# Patient Record
Sex: FEMALE | Race: WHITE | Employment: OTHER | URBAN - METROPOLITAN AREA
[De-identification: names, ages, dates, MRNs, and addresses within clinical notes are randomized per-mention and may not be internally consistent; named-entity substitution may affect disease eponyms.]

---

## 2017-01-05 RX ORDER — LETROZOLE 2.5 MG/1
2.5 TABLET, FILM COATED ORAL DAILY
Qty: 90 TABLET | Refills: 2 | Status: SHIPPED | OUTPATIENT
Start: 2017-01-05 | End: 2017-06-06

## 2017-02-03 ENCOUNTER — NURSE ONLY (OUTPATIENT)
Dept: HEMATOLOGY/ONCOLOGY | Facility: HOSPITAL | Age: 57
End: 2017-02-03
Attending: INTERNAL MEDICINE
Payer: COMMERCIAL

## 2017-02-03 DIAGNOSIS — Z45.2 ENCOUNTER FOR ADJUSTMENT OR MANAGEMENT OF VASCULAR ACCESS DEVICE: Primary | ICD-10-CM

## 2017-02-03 DIAGNOSIS — C48.2 MALIGNANT NEOPLASM OF PERITONEUM (HCC): ICD-10-CM

## 2017-02-03 PROCEDURE — 96523 IRRIG DRUG DELIVERY DEVICE: CPT

## 2017-02-03 RX ORDER — HEPARIN SODIUM (PORCINE) LOCK FLUSH IV SOLN 100 UNIT/ML 100 UNIT/ML
SOLUTION INTRAVENOUS
Status: COMPLETED
Start: 2017-02-03 | End: 2017-02-03

## 2017-02-03 RX ORDER — SODIUM CHLORIDE 0.9 % (FLUSH) 0.9 %
SYRINGE (ML) INJECTION
Status: DISCONTINUED
Start: 2017-02-03 | End: 2017-02-03

## 2017-02-03 RX ORDER — 0.9 % SODIUM CHLORIDE 0.9 %
VIAL (ML) INJECTION
Status: DISCONTINUED
Start: 2017-02-03 | End: 2017-02-03

## 2017-02-03 RX ORDER — HEPARIN SODIUM (PORCINE) LOCK FLUSH IV SOLN 100 UNIT/ML 100 UNIT/ML
5 SOLUTION INTRAVENOUS ONCE
Status: COMPLETED | OUTPATIENT
Start: 2017-02-03 | End: 2017-02-03

## 2017-02-03 RX ADMIN — HEPARIN SODIUM (PORCINE) LOCK FLUSH IV SOLN 100 UNIT/ML 500 UNITS: 100 SOLUTION INTRAVENOUS at 11:14:00

## 2017-02-03 NOTE — PROGRESS NOTES
Patient arrives for port flush. Port accessed using sterile technique, positive blood return noted. Port flushed with saline and heparin. Port de accessed, site covered with 2x2 and paper tape.  Patient discharged in stable condition with future appointment

## 2017-02-21 ENCOUNTER — APPOINTMENT (OUTPATIENT)
Dept: HEMATOLOGY/ONCOLOGY | Facility: HOSPITAL | Age: 57
End: 2017-02-21
Attending: INTERNAL MEDICINE
Payer: COMMERCIAL

## 2017-03-30 ENCOUNTER — TELEPHONE (OUTPATIENT)
Dept: HEMATOLOGY/ONCOLOGY | Facility: HOSPITAL | Age: 57
End: 2017-03-30

## 2017-03-30 ENCOUNTER — NURSE ONLY (OUTPATIENT)
Dept: HEMATOLOGY/ONCOLOGY | Facility: HOSPITAL | Age: 57
End: 2017-03-30
Attending: INTERNAL MEDICINE
Payer: COMMERCIAL

## 2017-03-30 ENCOUNTER — HOSPITAL ENCOUNTER (OUTPATIENT)
Dept: CT IMAGING | Facility: HOSPITAL | Age: 57
Discharge: HOME | End: 2017-03-30
Attending: INTERNAL MEDICINE
Payer: COMMERCIAL

## 2017-03-30 DIAGNOSIS — C48.2 PRIMARY PERITONEAL CARCINOMATOSIS (HCC): ICD-10-CM

## 2017-03-30 DIAGNOSIS — Z45.2 ENCOUNTER FOR ADJUSTMENT OR MANAGEMENT OF VASCULAR ACCESS DEVICE: Primary | ICD-10-CM

## 2017-03-30 DIAGNOSIS — C48.2 MALIGNANT NEOPLASM OF PERITONEUM (HCC): ICD-10-CM

## 2017-03-30 PROCEDURE — 74177 CT ABD & PELVIS W/CONTRAST: CPT

## 2017-03-30 PROCEDURE — 71260 CT THORAX DX C+: CPT

## 2017-03-30 PROCEDURE — 36591 DRAW BLOOD OFF VENOUS DEVICE: CPT

## 2017-03-30 PROCEDURE — 82565 ASSAY OF CREATININE: CPT

## 2017-03-30 RX ORDER — 0.9 % SODIUM CHLORIDE 0.9 %
VIAL (ML) INJECTION
Status: DISCONTINUED
Start: 2017-03-30 | End: 2017-03-30

## 2017-03-30 RX ORDER — HEPARIN SODIUM (PORCINE) LOCK FLUSH IV SOLN 100 UNIT/ML 100 UNIT/ML
5 SOLUTION INTRAVENOUS ONCE
Status: COMPLETED | OUTPATIENT
Start: 2017-03-30 | End: 2017-03-30

## 2017-03-30 RX ORDER — HEPARIN SODIUM (PORCINE) LOCK FLUSH IV SOLN 100 UNIT/ML 100 UNIT/ML
SOLUTION INTRAVENOUS
Status: COMPLETED
Start: 2017-03-30 | End: 2017-03-30

## 2017-03-30 RX ADMIN — HEPARIN SODIUM (PORCINE) LOCK FLUSH IV SOLN 100 UNIT/ML 500 UNITS: 100 SOLUTION INTRAVENOUS at 13:10:00

## 2017-03-30 NOTE — PROGRESS NOTES
Pt brought back to lab ambulating without assistance. Pt is going for a CT today. Port accessed with power port needle using sterile technique without complication, blood return noted. Labs drawn and labled, given to pt.   Line flushed with 20 cc saline

## 2017-03-30 NOTE — TELEPHONE ENCOUNTER
3/30/17 CT c/a/p shows DE/SD in abdomen. Incidental finding of SMA thrombosis. Patient reports new right-sided abdominal pain x 1 month, lasts a 1-2 seconds only and occurred 2 times to date. Pradaxa 150 mg BID. Stop ASA 81 mg.      Bleeding and GI

## 2017-03-31 ENCOUNTER — TELEPHONE (OUTPATIENT)
Dept: HEMATOLOGY/ONCOLOGY | Facility: HOSPITAL | Age: 57
End: 2017-03-31

## 2017-03-31 NOTE — TELEPHONE ENCOUNTER
Message from answering service stating that [address] [place] [address] [phone] does not cover RX. Patient sent message to Dr. Rowdy Salmeron re: this same message.

## 2017-04-03 ENCOUNTER — TELEPHONE (OUTPATIENT)
Dept: HEMATOLOGY/ONCOLOGY | Facility: HOSPITAL | Age: 57
End: 2017-04-03

## 2017-04-03 NOTE — TELEPHONE ENCOUNTER
Returned call, patient to start Xarelto today just received kit from pharmacy. To follow up with MD in a couple of days.

## 2017-04-03 NOTE — TELEPHONE ENCOUNTER
PLEASE CALL PT ASAP [phone] REGARDING THE PRADAXA MED THAT MD ORDERED IS NOT COVERED BY HER INS.   ON FRI 3/31/17,THE MD ON CALL ORDERED THE XARALTO STARTER KIT FOR PT.  SHOULD PT TAKE THE XARELTO OR THE ELOQUIST?-MKV

## 2017-04-05 ENCOUNTER — OFFICE VISIT (OUTPATIENT)
Dept: HEMATOLOGY/ONCOLOGY | Facility: HOSPITAL | Age: 57
End: 2017-04-05
Attending: INTERNAL MEDICINE
Payer: COMMERCIAL

## 2017-04-05 VITALS
WEIGHT: 204 LBS | TEMPERATURE: 99 F | SYSTOLIC BLOOD PRESSURE: 139 MMHG | HEART RATE: 103 BPM | BODY MASS INDEX: 34.83 KG/M2 | DIASTOLIC BLOOD PRESSURE: 78 MMHG | HEIGHT: 64 IN | RESPIRATION RATE: 18 BRPM

## 2017-04-05 DIAGNOSIS — K55.069 SUPERIOR MESENTERIC VEIN THROMBOSIS (HCC): ICD-10-CM

## 2017-04-05 DIAGNOSIS — C48.2 PRIMARY PERITONEAL CARCINOMATOSIS (HCC): Primary | ICD-10-CM

## 2017-04-05 DIAGNOSIS — M25.561 ARTHRALGIA OF BOTH KNEES: ICD-10-CM

## 2017-04-05 DIAGNOSIS — Z51.11 ENCOUNTER FOR CHEMOTHERAPY MANAGEMENT: ICD-10-CM

## 2017-04-05 DIAGNOSIS — M25.562 ARTHRALGIA OF BOTH KNEES: ICD-10-CM

## 2017-04-05 PROCEDURE — 99215 OFFICE O/P EST HI 40 MIN: CPT | Performed by: INTERNAL MEDICINE

## 2017-04-05 NOTE — PROGRESS NOTES
Cancer Center Progress Note    Patient Name: Amrit Castaneda   YOB: 1960   Medical Record Number: [id number]   Attending Physician: Kulwant Boucher M.D.      Chief Complaint:  F/u primary peritoneal carcinoma    History of Present Illness:  Aysha patient is on anticoagulation with rivaroxaban. Interval history:  Patient is now rivaroxaban in setting of SMV thrombosis. She did have some mild abdominal pain at that time. This is improved. She has continued on letrozole.   She denies any fevers abdominal wall (8 cm), and omentectomy,  Small bowel resection with primary anastomosis, Mobilization of the splenic flexure, Low anterior resection with colorectal anastomosis, Hyperthermic intraperitoneal chemotherapy with cisplatin and doxorubicin    OT insulin aspart (NOVOLOG) 100 UNIT/ML Subcutaneous Solution, Inject via insulin pump, maximum of 200 units daily, Disp: 18 vial, Rfl: 1  •  CARVEDILOL 12.5 MG Oral Tab, TAKE 1 TABLET BY MOUTH 2 (TWO) TIMES DAILY WITH MEALS., Disp: 180 tablet, Rfl: 1  •  Lop K, CL, CO2, ALKPHO, AST, ALT, BILT, TP in the last 72 hours. Radiology:  CT CAP 3/30/17  1. Metastatic peritoneal nodules are redemonstrated. The nodules are stable to decreased in size. No new nodules are present.      2.  Nonocclusive thrombus in the 2016  --Evaluated by Dr Gera Stewart at [facility]. 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 Staining positive for ER. The patient is now on letrozole as of 10/24/2016.   Her imaging shows inte

## 2017-05-16 ENCOUNTER — PATIENT MESSAGE (OUTPATIENT)
Dept: ENDOCRINOLOGY CLINIC | Facility: CLINIC | Age: 57
End: 2017-05-16

## 2017-05-16 ENCOUNTER — TELEPHONE (OUTPATIENT)
Dept: ENDOCRINOLOGY CLINIC | Facility: CLINIC | Age: 57
End: 2017-05-16

## 2017-05-16 RX ORDER — INSULIN ASPART 100 [IU]/ML
INJECTION, SOLUTION INTRAVENOUS; SUBCUTANEOUS
Qty: 18 VIAL | Refills: 0 | Status: SHIPPED | OUTPATIENT
Start: 2017-05-16 | End: 2017-06-07

## 2017-05-16 NOTE — TELEPHONE ENCOUNTER
Follow up scheduled 7/19/17. Per [facility] protocol ok to refill through upcoming appt.

## 2017-05-16 NOTE — TELEPHONE ENCOUNTER
Pt called to request 90 day RX for pump. Pt scheduled next available appt in [place] on 7/19/17. Please call.       Current outpatient prescriptions:   •  insulin aspart (NOVOLOG) 100 UNIT/ML Subcutaneous Solution, Inject via insulin pump, maximum of 200 unit

## 2017-05-23 ENCOUNTER — APPOINTMENT (OUTPATIENT)
Dept: HEMATOLOGY/ONCOLOGY | Facility: HOSPITAL | Age: 57
End: 2017-05-23
Attending: INTERNAL MEDICINE
Payer: COMMERCIAL

## 2017-05-25 ENCOUNTER — APPOINTMENT (OUTPATIENT)
Dept: HEMATOLOGY/ONCOLOGY | Facility: HOSPITAL | Age: 57
End: 2017-05-25
Attending: INTERNAL MEDICINE
Payer: COMMERCIAL

## 2017-06-06 ENCOUNTER — NURSE ONLY (OUTPATIENT)
Dept: HEMATOLOGY/ONCOLOGY | Facility: HOSPITAL | Age: 57
End: 2017-06-06
Attending: INTERNAL MEDICINE
Payer: COMMERCIAL

## 2017-06-06 VITALS
BODY MASS INDEX: 35.17 KG/M2 | HEIGHT: 64 IN | SYSTOLIC BLOOD PRESSURE: 138 MMHG | HEART RATE: 76 BPM | DIASTOLIC BLOOD PRESSURE: 70 MMHG | TEMPERATURE: 99 F | RESPIRATION RATE: 16 BRPM | WEIGHT: 206 LBS

## 2017-06-06 DIAGNOSIS — M25.561 ARTHRALGIA OF BOTH KNEES: ICD-10-CM

## 2017-06-06 DIAGNOSIS — M25.562 ARTHRALGIA OF BOTH KNEES: ICD-10-CM

## 2017-06-06 DIAGNOSIS — Z51.11 ENCOUNTER FOR CHEMOTHERAPY MANAGEMENT: Primary | ICD-10-CM

## 2017-06-06 DIAGNOSIS — C48.2 PRIMARY PERITONEAL CARCINOMATOSIS (HCC): Primary | ICD-10-CM

## 2017-06-06 DIAGNOSIS — C48.2 MALIGNANT NEOPLASM OF PERITONEUM (HCC): ICD-10-CM

## 2017-06-06 DIAGNOSIS — Z45.2 ENCOUNTER FOR ADJUSTMENT OR MANAGEMENT OF VASCULAR ACCESS DEVICE: ICD-10-CM

## 2017-06-06 DIAGNOSIS — C48.2 PRIMARY PERITONEAL CARCINOMATOSIS (HCC): ICD-10-CM

## 2017-06-06 DIAGNOSIS — K55.069 SUPERIOR MESENTERIC VEIN THROMBOSIS (HCC): ICD-10-CM

## 2017-06-06 PROCEDURE — 80053 COMPREHEN METABOLIC PANEL: CPT

## 2017-06-06 PROCEDURE — 36591 DRAW BLOOD OFF VENOUS DEVICE: CPT

## 2017-06-06 PROCEDURE — 85025 COMPLETE CBC W/AUTO DIFF WBC: CPT

## 2017-06-06 PROCEDURE — 99215 OFFICE O/P EST HI 40 MIN: CPT | Performed by: INTERNAL MEDICINE

## 2017-06-06 RX ORDER — 0.9 % SODIUM CHLORIDE 0.9 %
VIAL (ML) INJECTION
Status: DISCONTINUED
Start: 2017-06-06 | End: 2017-06-06

## 2017-06-06 RX ORDER — HEPARIN SODIUM (PORCINE) LOCK FLUSH IV SOLN 100 UNIT/ML 100 UNIT/ML
SOLUTION INTRAVENOUS
Status: COMPLETED
Start: 2017-06-06 | End: 2017-06-06

## 2017-06-06 RX ORDER — HEPARIN SODIUM (PORCINE) LOCK FLUSH IV SOLN 100 UNIT/ML 100 UNIT/ML
5 SOLUTION INTRAVENOUS ONCE
Status: COMPLETED | OUTPATIENT
Start: 2017-06-06 | End: 2017-06-06

## 2017-06-06 RX ORDER — LETROZOLE 2.5 MG/1
2.5 TABLET, FILM COATED ORAL DAILY
Qty: 90 TABLET | Refills: 2 | Status: SHIPPED | OUTPATIENT
Start: 2017-06-06 | End: 2017-10-02

## 2017-06-06 RX ADMIN — HEPARIN SODIUM (PORCINE) LOCK FLUSH IV SOLN 100 UNIT/ML 500 UNITS: 100 SOLUTION INTRAVENOUS at 13:20:00

## 2017-06-06 NOTE — PROGRESS NOTES
Cancer Center Progress Note    Patient Name: Valencia Virk   YOB: 1960   Medical Record Number: [id number]   Attending Physician: Nidhi Whelan M.D.      Chief Complaint:  F/u primary peritoneal carcinoma    History of Present Illness:  Aysha patient is on anticoagulation with rivaroxaban. Interval history:  She is returning for routine follow-up. She is doing well overall. She states she has gained some weight. She denies abdominal pain. She denies any bruising or bleeding.   She denies colorectal anastomosis, Hyperthermic intraperitoneal chemotherapy with cisplatin and doxorubicin    OTHER SURGICAL HISTORY  07/21/2015    Comment Ileostomy takedown,   Left subclavian vein single-lumen port placement with fluoroscopic guidance.        Ashley Dixon 11  •  diphenoxylate-atropine (LOMOTIL) 2.5-0.025 MG Oral Tab, Take 1 tablet by mouth 4 (four) times daily as needed for Diarrhea., Disp: 360 tablet, Rfl: 1    Allergies:    Adhesive Tape           Itching    Comment:PLEASE USE MEPORE DRESSING AND A 3M HUMBLE platinum resistant.   She initially presented to ER w/abdominal pain and underwent laparoscopic appendectomy by Dr Diaz [place] (12/2013).   Peritoneal implants were noted and final pathology revealed \"findings are most suggestive of ovarian invasive implants w anticoagulation in March 2017. Continue rivaroxaban. Tentatively plan for 6 months of treatment. The patient's emotional well being was assessed and resources were discussed.   Appropriate resources were reviewed and discussed with the pateint and iraida

## 2017-06-06 NOTE — PROGRESS NOTES
Patient here for FT lab followed by MD visit. Left chest port accessed per sterile technique, good blood return noted. Cbc,cmp obtained and sent to lab. Port flushed per protocol and thomas needle removed. Gauze and paper tape to site.   Patient discharg

## 2017-06-07 ENCOUNTER — OFFICE VISIT (OUTPATIENT)
Dept: ENDOCRINOLOGY CLINIC | Facility: CLINIC | Age: 57
End: 2017-06-07

## 2017-06-07 VITALS
HEIGHT: 65 IN | HEART RATE: 86 BPM | DIASTOLIC BLOOD PRESSURE: 80 MMHG | SYSTOLIC BLOOD PRESSURE: 124 MMHG | WEIGHT: 204.63 LBS | BODY MASS INDEX: 34.09 KG/M2

## 2017-06-07 DIAGNOSIS — Z79.4 UNCONTROLLED TYPE 2 DIABETES MELLITUS WITH HYPERGLYCEMIA, WITH LONG-TERM CURRENT USE OF INSULIN (HCC): Primary | ICD-10-CM

## 2017-06-07 DIAGNOSIS — E11.65 UNCONTROLLED TYPE 2 DIABETES MELLITUS WITH HYPERGLYCEMIA, WITH LONG-TERM CURRENT USE OF INSULIN (HCC): Primary | ICD-10-CM

## 2017-06-07 PROCEDURE — 99214 OFFICE O/P EST MOD 30 MIN: CPT | Performed by: INTERNAL MEDICINE

## 2017-06-07 PROCEDURE — 83036 HEMOGLOBIN GLYCOSYLATED A1C: CPT | Performed by: INTERNAL MEDICINE

## 2017-06-07 PROCEDURE — 36416 COLLJ CAPILLARY BLOOD SPEC: CPT | Performed by: INTERNAL MEDICINE

## 2017-06-07 PROCEDURE — 99212 OFFICE O/P EST SF 10 MIN: CPT | Performed by: INTERNAL MEDICINE

## 2017-06-07 PROCEDURE — 82962 GLUCOSE BLOOD TEST: CPT | Performed by: INTERNAL MEDICINE

## 2017-06-07 RX ORDER — INSULIN ASPART 100 [IU]/ML
INJECTION, SOLUTION INTRAVENOUS; SUBCUTANEOUS
Qty: 18 VIAL | Refills: 3 | Status: SHIPPED | OUTPATIENT
Start: 2017-06-07 | End: 2018-02-20

## 2017-06-07 RX ORDER — CARVEDILOL 12.5 MG/1
TABLET ORAL
Qty: 180 TABLET | Refills: 1 | Status: SHIPPED | OUTPATIENT
Start: 2017-06-07 | End: 2017-09-28

## 2017-06-07 RX ORDER — ATORVASTATIN CALCIUM 40 MG/1
40 TABLET, FILM COATED ORAL NIGHTLY
Qty: 90 TABLET | Refills: 3 | Status: SHIPPED | OUTPATIENT
Start: 2017-06-07 | End: 2018-02-20

## 2017-06-07 NOTE — PROGRESS NOTES
Name: Luis Mock  Date: 6/7/2017    Referring Physician: No ref. provider found    HISTORY OF PRESENT ILLNESS   Luis Mock is a 61year old female who presents for diabetes mellitus.   She is now maintained on letrozole for ovarian metastatic CA, Disp: , Rfl:   •  Glucose Blood (FREESTYLE TEST) In Vitro Strip, Test blood glucose level 5 times per day, Disp: 200 each, Rfl: 11  •  diphenoxylate-atropine (LOMOTIL) 2.5-0.025 MG Oral Tab, Take 1 tablet by mouth 4 (four) times daily as needed for Diarrhe Bilateral feet   • History of blood transfusion        Surgical history:       Past Surgical History    APPENDECTOMY  12/2013    HYSTERECTOMY  1999    Comment OSIEL/BSO     UPPER GI ENDOSCOPY - REFERRAL  2013    OTHER SURGICAL HISTORY  03/31/2015    Comment importance of SBGM  -Discussed importance of CHO counting  -Significant improvement in BG levels  -Reviewed pump download and continue current settings  -She will be transitioning to Medicare, discussed possible medtronic pump - check C-peptide   -Normoten

## 2017-06-09 ENCOUNTER — TELEPHONE (OUTPATIENT)
Dept: ENDOCRINOLOGY CLINIC | Facility: CLINIC | Age: 57
End: 2017-06-09

## 2017-06-09 NOTE — TELEPHONE ENCOUNTER
Pt calling to inform RN [employer] will be faxing forms to [facility] re: [address] shipment. States it's a one time request for prescription to change pods more frequently because original shipment was lost.  Pls call [phone]. Thank you.

## 2017-08-22 RX ORDER — INSULIN ASPART 100 [IU]/ML
INJECTION, SOLUTION INTRAVENOUS; SUBCUTANEOUS
Qty: 180 ML | Refills: 1 | Status: SHIPPED | OUTPATIENT
Start: 2017-08-22 | End: 2017-12-11

## 2017-08-23 ENCOUNTER — HOSPITAL ENCOUNTER (OUTPATIENT)
Dept: CT IMAGING | Facility: HOSPITAL | Age: 57
Discharge: HOME | End: 2017-08-23
Attending: INTERNAL MEDICINE
Payer: COMMERCIAL

## 2017-08-23 ENCOUNTER — NURSE ONLY (OUTPATIENT)
Dept: HEMATOLOGY/ONCOLOGY | Facility: HOSPITAL | Age: 57
End: 2017-08-23
Attending: INTERNAL MEDICINE
Payer: COMMERCIAL

## 2017-08-23 VITALS — DIASTOLIC BLOOD PRESSURE: 79 MMHG | HEART RATE: 64 BPM | SYSTOLIC BLOOD PRESSURE: 160 MMHG

## 2017-08-23 DIAGNOSIS — Z79.4 UNCONTROLLED TYPE 2 DIABETES MELLITUS WITH HYPERGLYCEMIA, WITH LONG-TERM CURRENT USE OF INSULIN (HCC): ICD-10-CM

## 2017-08-23 DIAGNOSIS — C48.2 MALIGNANT NEOPLASM OF PERITONEUM (HCC): ICD-10-CM

## 2017-08-23 DIAGNOSIS — C48.2 PRIMARY PERITONEAL CARCINOMATOSIS (HCC): ICD-10-CM

## 2017-08-23 DIAGNOSIS — Z45.2 ENCOUNTER FOR ADJUSTMENT OR MANAGEMENT OF VASCULAR ACCESS DEVICE: ICD-10-CM

## 2017-08-23 DIAGNOSIS — E11.65 UNCONTROLLED TYPE 2 DIABETES MELLITUS WITH HYPERGLYCEMIA, WITH LONG-TERM CURRENT USE OF INSULIN (HCC): ICD-10-CM

## 2017-08-23 DIAGNOSIS — K55.069 SUPERIOR MESENTERIC VEIN THROMBOSIS (HCC): Primary | ICD-10-CM

## 2017-08-23 LAB
ALBUMIN SERPL BCP-MCNC: 4.2 G/DL (ref 3.5–4.8)
ALBUMIN/GLOB SERPL: 1.7 {RATIO} (ref 1–2)
ALP SERPL-CCNC: 50 U/L (ref 32–100)
ALT SERPL-CCNC: 74 U/L (ref 14–54)
ANION GAP SERPL CALC-SCNC: 9 MMOL/L (ref 0–18)
AST SERPL-CCNC: 70 U/L (ref 15–41)
BASOPHILS # BLD: 0 K/UL (ref 0–0.2)
BASOPHILS NFR BLD: 1 %
BILIRUB SERPL-MCNC: 1.9 MG/DL (ref 0.3–1.2)
BUN SERPL-MCNC: 11 MG/DL (ref 8–20)
BUN/CREAT SERPL: 12.9 (ref 10–20)
CALCIUM SERPL-MCNC: 8.9 MG/DL (ref 8.5–10.5)
CHLORIDE SERPL-SCNC: 101 MMOL/L (ref 95–110)
CHOLEST SERPL-MCNC: 85 MG/DL (ref 110–200)
CO2 SERPL-SCNC: 27 MMOL/L (ref 22–32)
CREAT BLD-MCNC: 0.7 MG/DL (ref 0.5–1.5)
CREAT SERPL-MCNC: 0.85 MG/DL (ref 0.5–1.5)
EOSINOPHIL # BLD: 0.1 K/UL (ref 0–0.7)
EOSINOPHIL NFR BLD: 1 %
ERYTHROCYTE [DISTWIDTH] IN BLOOD BY AUTOMATED COUNT: 14.8 % (ref 11–15)
GLOBULIN PLAS-MCNC: 2.5 G/DL (ref 2.5–3.7)
GLUCOSE SERPL-MCNC: 97 MG/DL (ref 70–99)
HCT VFR BLD AUTO: 38.3 % (ref 35–48)
HDLC SERPL-MCNC: 45 MG/DL
HGB BLD-MCNC: 13.2 G/DL (ref 12–16)
LDLC SERPL CALC-MCNC: 25 MG/DL (ref 0–99)
LYMPHOCYTES # BLD: 1.7 K/UL (ref 1–4)
LYMPHOCYTES NFR BLD: 31 %
MCH RBC QN AUTO: 29.3 PG (ref 27–32)
MCHC RBC AUTO-ENTMCNC: 34.5 G/DL (ref 32–37)
MCV RBC AUTO: 84.8 FL (ref 80–100)
MONOCYTES # BLD: 0.4 K/UL (ref 0–1)
MONOCYTES NFR BLD: 8 %
NEUTROPHILS # BLD AUTO: 3.3 K/UL (ref 1.8–7.7)
NEUTROPHILS NFR BLD: 60 %
NONHDLC SERPL-MCNC: 40 MG/DL
OSMOLALITY UR CALC.SUM OF ELEC: 283 MOSM/KG (ref 275–295)
PLATELET # BLD AUTO: 149 K/UL (ref 140–400)
PMV BLD AUTO: 9 FL (ref 7.4–10.3)
POTASSIUM SERPL-SCNC: 3.5 MMOL/L (ref 3.3–5.1)
PROT SERPL-MCNC: 6.7 G/DL (ref 5.9–8.4)
RBC # BLD AUTO: 4.51 M/UL (ref 3.7–5.4)
SODIUM SERPL-SCNC: 137 MMOL/L (ref 136–144)
TRIGL SERPL-MCNC: 74 MG/DL (ref 1–149)
WBC # BLD AUTO: 5.6 K/UL (ref 4–11)

## 2017-08-23 PROCEDURE — 80053 COMPREHEN METABOLIC PANEL: CPT

## 2017-08-23 PROCEDURE — 84681 ASSAY OF C-PEPTIDE: CPT

## 2017-08-23 PROCEDURE — 85025 COMPLETE CBC W/AUTO DIFF WBC: CPT

## 2017-08-23 PROCEDURE — 36591 DRAW BLOOD OFF VENOUS DEVICE: CPT

## 2017-08-23 PROCEDURE — 82565 ASSAY OF CREATININE: CPT

## 2017-08-23 PROCEDURE — 71260 CT THORAX DX C+: CPT | Performed by: INTERNAL MEDICINE

## 2017-08-23 PROCEDURE — 80061 LIPID PANEL: CPT

## 2017-08-23 PROCEDURE — 74177 CT ABD & PELVIS W/CONTRAST: CPT | Performed by: INTERNAL MEDICINE

## 2017-08-23 RX ORDER — 0.9 % SODIUM CHLORIDE 0.9 %
10 VIAL (ML) INJECTION ONCE
Status: CANCELLED | OUTPATIENT
Start: 2017-08-23

## 2017-08-23 RX ORDER — 0.9 % SODIUM CHLORIDE 0.9 %
VIAL (ML) INJECTION
Status: DISCONTINUED
Start: 2017-08-23 | End: 2017-08-23

## 2017-08-23 RX ORDER — HEPARIN SODIUM (PORCINE) LOCK FLUSH IV SOLN 100 UNIT/ML 100 UNIT/ML
5 SOLUTION INTRAVENOUS ONCE
Status: COMPLETED | OUTPATIENT
Start: 2017-08-23 | End: 2017-08-23

## 2017-08-23 RX ORDER — HEPARIN SODIUM (PORCINE) LOCK FLUSH IV SOLN 100 UNIT/ML 100 UNIT/ML
5 SOLUTION INTRAVENOUS ONCE
Status: CANCELLED | OUTPATIENT
Start: 2017-08-23

## 2017-08-23 RX ORDER — HEPARIN SODIUM (PORCINE) LOCK FLUSH IV SOLN 100 UNIT/ML 100 UNIT/ML
SOLUTION INTRAVENOUS
Status: DISCONTINUED
Start: 2017-08-23 | End: 2017-08-23

## 2017-08-23 RX ADMIN — HEPARIN SODIUM (PORCINE) LOCK FLUSH IV SOLN 100 UNIT/ML 500 UNITS: 100 SOLUTION INTRAVENOUS at 12:20:00

## 2017-08-24 ENCOUNTER — TELEPHONE (OUTPATIENT)
Dept: ENDOCRINOLOGY CLINIC | Facility: CLINIC | Age: 57
End: 2017-08-24

## 2017-08-24 DIAGNOSIS — E11.65 UNCONTROLLED TYPE 2 DIABETES MELLITUS WITH COMPLICATION, WITH LONG-TERM CURRENT USE OF INSULIN (HCC): Primary | ICD-10-CM

## 2017-08-24 DIAGNOSIS — E11.8 UNCONTROLLED TYPE 2 DIABETES MELLITUS WITH COMPLICATION, WITH LONG-TERM CURRENT USE OF INSULIN (HCC): Primary | ICD-10-CM

## 2017-08-24 DIAGNOSIS — Z79.4 UNCONTROLLED TYPE 2 DIABETES MELLITUS WITH COMPLICATION, WITH LONG-TERM CURRENT USE OF INSULIN (HCC): Primary | ICD-10-CM

## 2017-08-24 LAB — C-PEPTIDE, SERUM OR PLASMA: 1.4 NG/ML

## 2017-08-24 NOTE — TELEPHONE ENCOUNTER
Called Marlene Sanchez and let her know unfortunately C-peptide too high to qualify for pump with Medicare. Medicare coverage will start in September so she does have time to repeat. Order placed and she will try to go for test again.

## 2017-08-24 NOTE — TELEPHONE ENCOUNTER
Please call patient - c-peptide is too high for medicare qualification of pump. When will she be switching her insurance? We could try to check one more time to make sure.

## 2017-08-28 ENCOUNTER — APPOINTMENT (OUTPATIENT)
Dept: LAB | Age: 57
End: 2017-08-28
Attending: INTERNAL MEDICINE
Payer: COMMERCIAL

## 2017-08-28 DIAGNOSIS — Z79.4 UNCONTROLLED TYPE 2 DIABETES MELLITUS WITH COMPLICATION, WITH LONG-TERM CURRENT USE OF INSULIN (HCC): ICD-10-CM

## 2017-08-28 DIAGNOSIS — E11.65 UNCONTROLLED TYPE 2 DIABETES MELLITUS WITH COMPLICATION, WITH LONG-TERM CURRENT USE OF INSULIN (HCC): ICD-10-CM

## 2017-08-28 DIAGNOSIS — E11.8 UNCONTROLLED TYPE 2 DIABETES MELLITUS WITH COMPLICATION, WITH LONG-TERM CURRENT USE OF INSULIN (HCC): ICD-10-CM

## 2017-08-28 PROCEDURE — 84681 ASSAY OF C-PEPTIDE: CPT

## 2017-08-28 PROCEDURE — 36415 COLL VENOUS BLD VENIPUNCTURE: CPT

## 2017-08-29 ENCOUNTER — OFFICE VISIT (OUTPATIENT)
Dept: HEMATOLOGY/ONCOLOGY | Facility: HOSPITAL | Age: 57
End: 2017-08-29
Attending: INTERNAL MEDICINE
Payer: COMMERCIAL

## 2017-08-29 VITALS
WEIGHT: 208 LBS | SYSTOLIC BLOOD PRESSURE: 141 MMHG | DIASTOLIC BLOOD PRESSURE: 74 MMHG | RESPIRATION RATE: 16 BRPM | BODY MASS INDEX: 35.51 KG/M2 | HEART RATE: 79 BPM | HEIGHT: 64 IN | TEMPERATURE: 98 F

## 2017-08-29 DIAGNOSIS — M25.561 ARTHRALGIA OF BOTH KNEES: ICD-10-CM

## 2017-08-29 DIAGNOSIS — M25.562 ARTHRALGIA OF BOTH KNEES: ICD-10-CM

## 2017-08-29 DIAGNOSIS — K55.069 SUPERIOR MESENTERIC VEIN THROMBOSIS (HCC): Primary | ICD-10-CM

## 2017-08-29 DIAGNOSIS — C48.2 PRIMARY PERITONEAL CARCINOMATOSIS (HCC): ICD-10-CM

## 2017-08-29 PROCEDURE — 99214 OFFICE O/P EST MOD 30 MIN: CPT | Performed by: INTERNAL MEDICINE

## 2017-08-29 PROCEDURE — 99212 OFFICE O/P EST SF 10 MIN: CPT | Performed by: INTERNAL MEDICINE

## 2017-08-29 NOTE — PROGRESS NOTES
Cancer Center Progress Note    Patient Name: Maribell Brown   YOB: 1960   Medical Record Number: [id number]   Attending Physician: Perez Otto M.D.      Chief Complaint:  F/u primary peritoneal carcinoma    History of Present Illness:  Aysha months of anticoagulation with rivaroxaban. Thrombus was resolved on subsequent imaging      Interval history:  She is returning for routine follow-up. She is doing well overall. She states she has gained some weight again. She denies abdominal pain. resection with primary anastomosis,                Mobilization of the splenic flexure, Low                anterior resection with colorectal anastomosis,               Hyperthermic intraperitoneal chemotherapy with                cisplatin and doxorubicin tablet, Rfl: 2  •  Rivaroxaban 20 MG Oral Tab, Take 1 tablet (20 mg total) by mouth daily with food. , Disp: 90 tablet, Rfl: 1  •  Citalopram Hydrobromide (CELEXA) 10 MG Oral Tab, Take 1 tablet (10 mg total) by mouth daily. , Disp: 90 tablet, Rfl: 3  •  Dionisio GLU, BUN, CREATSERUM, GFRAA, GFRNAA, CA, ALB, NA, K, CL, CO2, ALKPHO, AST, ALT, BILT, TP in the last 72 hours. Radiology:  CT CAP 8/17  Continued interval response of disease on personal review. SMV thrombus has also resolved.     Impression and Plan: imaging again. We will continue letrozole. –return to clinic in 4 months with repeat laboratory work   --Knee arthralgias likely secondary to letrozole. Monitor for now. –SMV thrombosis --started anticoagulation in March 2017. Continue rivaroxaban.

## 2017-08-30 LAB — C-PEPTIDE, SERUM OR PLASMA: 1.5 NG/ML

## 2017-09-28 RX ORDER — CARVEDILOL 12.5 MG/1
TABLET ORAL
Qty: 180 TABLET | Refills: 1 | Status: SHIPPED | OUTPATIENT
Start: 2017-09-28 | End: 2017-12-11

## 2017-10-02 ENCOUNTER — TELEPHONE (OUTPATIENT)
Dept: HEMATOLOGY/ONCOLOGY | Facility: HOSPITAL | Age: 57
End: 2017-10-02

## 2017-10-02 RX ORDER — LETROZOLE 2.5 MG/1
2.5 TABLET, FILM COATED ORAL DAILY
Qty: 90 TABLET | Refills: 2 | Status: CANCELLED
Start: 2017-10-02

## 2017-10-02 RX ORDER — LETROZOLE 2.5 MG/1
2.5 TABLET, FILM COATED ORAL DAILY
Qty: 90 TABLET | Refills: 2 | Status: SHIPPED | OUTPATIENT
Start: 2017-10-02 | End: 2018-06-11

## 2017-10-12 ENCOUNTER — TELEPHONE (OUTPATIENT)
Dept: ENDOCRINOLOGY CLINIC | Facility: CLINIC | Age: 57
End: 2017-10-12

## 2017-10-24 ENCOUNTER — APPOINTMENT (OUTPATIENT)
Dept: HEMATOLOGY/ONCOLOGY | Facility: HOSPITAL | Age: 57
End: 2017-10-24
Payer: MEDICARE

## 2017-11-07 ENCOUNTER — NURSE ONLY (OUTPATIENT)
Dept: HEMATOLOGY/ONCOLOGY | Facility: HOSPITAL | Age: 57
End: 2017-11-07
Attending: INTERNAL MEDICINE
Payer: MEDICARE

## 2017-11-07 DIAGNOSIS — C48.2 MALIGNANT NEOPLASM OF PERITONEUM (HCC): ICD-10-CM

## 2017-11-07 DIAGNOSIS — Z45.2 ENCOUNTER FOR ADJUSTMENT OR MANAGEMENT OF VASCULAR ACCESS DEVICE: Primary | ICD-10-CM

## 2017-11-07 PROCEDURE — 96523 IRRIG DRUG DELIVERY DEVICE: CPT

## 2017-11-07 RX ORDER — 0.9 % SODIUM CHLORIDE 0.9 %
VIAL (ML) INJECTION
Status: DISCONTINUED
Start: 2017-11-07 | End: 2017-11-07

## 2017-11-07 RX ORDER — HEPARIN SODIUM (PORCINE) LOCK FLUSH IV SOLN 100 UNIT/ML 100 UNIT/ML
SOLUTION INTRAVENOUS
Status: COMPLETED
Start: 2017-11-07 | End: 2017-11-07

## 2017-11-07 RX ORDER — HEPARIN SODIUM (PORCINE) LOCK FLUSH IV SOLN 100 UNIT/ML 100 UNIT/ML
5 SOLUTION INTRAVENOUS ONCE
Status: CANCELLED | OUTPATIENT
Start: 2017-11-07

## 2017-11-07 RX ORDER — 0.9 % SODIUM CHLORIDE 0.9 %
10 VIAL (ML) INJECTION ONCE
Status: CANCELLED | OUTPATIENT
Start: 2017-11-07

## 2017-11-07 RX ORDER — HEPARIN SODIUM (PORCINE) LOCK FLUSH IV SOLN 100 UNIT/ML 100 UNIT/ML
5 SOLUTION INTRAVENOUS ONCE
Status: COMPLETED | OUTPATIENT
Start: 2017-11-07 | End: 2017-11-07

## 2017-11-07 RX ADMIN — HEPARIN SODIUM (PORCINE) LOCK FLUSH IV SOLN 100 UNIT/ML 500 UNITS: 100 SOLUTION INTRAVENOUS at 15:00:00

## 2017-11-07 NOTE — PROGRESS NOTES
Pt to lab for routine port flush. Pt only complaint is \"stiff joints\" since starting hormonal medication. Reports that at times this does interfere with fine motor skills. Pt states that MD is aware of this issue.   Port accessed and flushed with good

## 2017-11-21 ENCOUNTER — TELEPHONE (OUTPATIENT)
Dept: HEMATOLOGY/ONCOLOGY | Facility: HOSPITAL | Age: 57
End: 2017-11-21

## 2017-11-21 NOTE — TELEPHONE ENCOUNTER
Pt is requesting to get labs ordered along with magnesium. Pt would like a to be contacted via [employer] [facility] when labs have been entered. She is scheduled for a port access on 1/9/18 and will be getting labs at that time.  Thank you

## 2017-12-11 ENCOUNTER — PATIENT MESSAGE (OUTPATIENT)
Dept: ENDOCRINOLOGY CLINIC | Facility: CLINIC | Age: 57
End: 2017-12-11

## 2017-12-11 NOTE — TELEPHONE ENCOUNTER
From: Joon Berger  To: Zhanna Redding MD  Sent: 12/11/2017 2:35 PM CST  Subject: Prescription Question    Dr Rob Vanegas & Staff!     I am in the process of switching from [facility] as my main pharmacy, to  [facility] in [place] ([phone]) for;  *Novolog

## 2017-12-11 NOTE — TELEPHONE ENCOUNTER
LOV 6/7/17 -No F/U Scheduled    Patient requesting to order Novolog and Carvedilol medication to new pharm -[facility] in [place], [place]     Meds pended for SH     (Atorvastatin continue to be filled in [place])

## 2017-12-12 RX ORDER — INSULIN ASPART 100 [IU]/ML
INJECTION, SOLUTION INTRAVENOUS; SUBCUTANEOUS
Qty: 180 ML | Refills: 1 | Status: SHIPPED | OUTPATIENT
Start: 2017-12-12 | End: 2018-02-21

## 2017-12-12 RX ORDER — CARVEDILOL 12.5 MG/1
TABLET ORAL
Qty: 180 TABLET | Refills: 1 | Status: SHIPPED | OUTPATIENT
Start: 2017-12-12 | End: 2018-02-20

## 2017-12-14 RX ORDER — CITALOPRAM 10 MG/1
10 TABLET ORAL DAILY
Qty: 90 TABLET | Refills: 3 | Status: SHIPPED
Start: 2017-12-14 | End: 2018-02-20

## 2018-01-09 ENCOUNTER — HOSPITAL ENCOUNTER (OUTPATIENT)
Dept: CT IMAGING | Facility: HOSPITAL | Age: 58
Discharge: HOME | End: 2018-01-09
Attending: INTERNAL MEDICINE
Payer: MEDICARE

## 2018-01-09 ENCOUNTER — TELEPHONE (OUTPATIENT)
Dept: ENDOCRINOLOGY CLINIC | Facility: CLINIC | Age: 58
End: 2018-01-09

## 2018-01-09 ENCOUNTER — PATIENT MESSAGE (OUTPATIENT)
Dept: ENDOCRINOLOGY CLINIC | Facility: CLINIC | Age: 58
End: 2018-01-09

## 2018-01-09 ENCOUNTER — NURSE ONLY (OUTPATIENT)
Dept: HEMATOLOGY/ONCOLOGY | Facility: HOSPITAL | Age: 58
End: 2018-01-09
Attending: INTERNAL MEDICINE
Payer: MEDICARE

## 2018-01-09 DIAGNOSIS — C48.2 PRIMARY PERITONEAL CARCINOMATOSIS (HCC): ICD-10-CM

## 2018-01-09 DIAGNOSIS — K55.069 SUPERIOR MESENTERIC VEIN THROMBOSIS (HCC): ICD-10-CM

## 2018-01-09 DIAGNOSIS — C56.9 OVARIAN CANCER (HCC): ICD-10-CM

## 2018-01-09 DIAGNOSIS — Z45.2 ENCOUNTER FOR ADJUSTMENT OR MANAGEMENT OF VASCULAR ACCESS DEVICE: Primary | ICD-10-CM

## 2018-01-09 DIAGNOSIS — C48.2 MALIGNANT NEOPLASM OF PERITONEUM (HCC): ICD-10-CM

## 2018-01-09 LAB
ALBUMIN SERPL BCP-MCNC: 4 G/DL (ref 3.5–4.8)
ALBUMIN/GLOB SERPL: 1.4 {RATIO} (ref 1–2)
ALP SERPL-CCNC: 67 U/L (ref 32–100)
ALT SERPL-CCNC: 79 U/L (ref 14–54)
ANION GAP SERPL CALC-SCNC: 8 MMOL/L (ref 0–18)
AST SERPL-CCNC: 74 U/L (ref 15–41)
BASOPHILS # BLD: 0 K/UL (ref 0–0.2)
BASOPHILS NFR BLD: 1 %
BILIRUB SERPL-MCNC: 1.5 MG/DL (ref 0.3–1.2)
BUN SERPL-MCNC: 11 MG/DL (ref 8–20)
BUN/CREAT SERPL: 11.3 (ref 10–20)
CALCIUM SERPL-MCNC: 8.7 MG/DL (ref 8.5–10.5)
CHLORIDE SERPL-SCNC: 101 MMOL/L (ref 95–110)
CO2 SERPL-SCNC: 29 MMOL/L (ref 22–32)
CREAT BLD-MCNC: 0.8 MG/DL (ref 0.5–1.5)
CREAT SERPL-MCNC: 0.97 MG/DL (ref 0.5–1.5)
EOSINOPHIL # BLD: 0.1 K/UL (ref 0–0.7)
EOSINOPHIL NFR BLD: 2 %
ERYTHROCYTE [DISTWIDTH] IN BLOOD BY AUTOMATED COUNT: 13.3 % (ref 11–15)
GLOBULIN PLAS-MCNC: 2.9 G/DL (ref 2.5–3.7)
GLUCOSE SERPL-MCNC: 240 MG/DL (ref 70–99)
HCT VFR BLD AUTO: 36.9 % (ref 35–48)
HGB BLD-MCNC: 13.1 G/DL (ref 12–16)
LYMPHOCYTES # BLD: 1.4 K/UL (ref 1–4)
LYMPHOCYTES NFR BLD: 30 %
MAGNESIUM SERPL-MCNC: 1 MG/DL (ref 1.8–2.5)
MCH RBC QN AUTO: 30.7 PG (ref 27–32)
MCHC RBC AUTO-ENTMCNC: 35.5 G/DL (ref 32–37)
MCV RBC AUTO: 86.3 FL (ref 80–100)
MONOCYTES # BLD: 0.4 K/UL (ref 0–1)
MONOCYTES NFR BLD: 8 %
NEUTROPHILS # BLD AUTO: 2.8 K/UL (ref 1.8–7.7)
NEUTROPHILS NFR BLD: 59 %
OSMOLALITY UR CALC.SUM OF ELEC: 293 MOSM/KG (ref 275–295)
PLATELET # BLD AUTO: 138 K/UL (ref 140–400)
PMV BLD AUTO: 8.5 FL (ref 7.4–10.3)
POTASSIUM SERPL-SCNC: 3.8 MMOL/L (ref 3.3–5.1)
PROT SERPL-MCNC: 6.9 G/DL (ref 5.9–8.4)
RBC # BLD AUTO: 4.27 M/UL (ref 3.7–5.4)
SODIUM SERPL-SCNC: 138 MMOL/L (ref 136–144)
WBC # BLD AUTO: 4.8 K/UL (ref 4–11)

## 2018-01-09 PROCEDURE — 82565 ASSAY OF CREATININE: CPT

## 2018-01-09 PROCEDURE — 80053 COMPREHEN METABOLIC PANEL: CPT

## 2018-01-09 PROCEDURE — 71260 CT THORAX DX C+: CPT | Performed by: INTERNAL MEDICINE

## 2018-01-09 PROCEDURE — 83735 ASSAY OF MAGNESIUM: CPT

## 2018-01-09 PROCEDURE — 74177 CT ABD & PELVIS W/CONTRAST: CPT | Performed by: INTERNAL MEDICINE

## 2018-01-09 PROCEDURE — 85025 COMPLETE CBC W/AUTO DIFF WBC: CPT

## 2018-01-09 PROCEDURE — 36591 DRAW BLOOD OFF VENOUS DEVICE: CPT

## 2018-01-09 RX ORDER — HEPARIN SODIUM (PORCINE) LOCK FLUSH IV SOLN 100 UNIT/ML 100 UNIT/ML
SOLUTION INTRAVENOUS
Status: DISCONTINUED
Start: 2018-01-09 | End: 2018-01-09

## 2018-01-09 RX ORDER — 0.9 % SODIUM CHLORIDE 0.9 %
VIAL (ML) INJECTION
Status: DISCONTINUED
Start: 2018-01-09 | End: 2018-01-09

## 2018-01-09 RX ORDER — HEPARIN SODIUM (PORCINE) LOCK FLUSH IV SOLN 100 UNIT/ML 100 UNIT/ML
5 SOLUTION INTRAVENOUS ONCE
Status: CANCELLED | OUTPATIENT
Start: 2018-01-09

## 2018-01-09 RX ORDER — 0.9 % SODIUM CHLORIDE 0.9 %
10 VIAL (ML) INJECTION ONCE
Status: CANCELLED | OUTPATIENT
Start: 2018-01-09

## 2018-01-09 RX ORDER — HEPARIN SODIUM (PORCINE) LOCK FLUSH IV SOLN 100 UNIT/ML 100 UNIT/ML
5 SOLUTION INTRAVENOUS ONCE
Status: COMPLETED | OUTPATIENT
Start: 2018-01-09 | End: 2018-01-09

## 2018-01-09 RX ADMIN — HEPARIN SODIUM (PORCINE) LOCK FLUSH IV SOLN 100 UNIT/ML 500 UNITS: 100 SOLUTION INTRAVENOUS at 14:50:00

## 2018-01-09 NOTE — TELEPHONE ENCOUNTER
From: Amira Rebollar  To: Chan Mei MD  Sent: 1/9/2018 10:14 AM CST  Subject: Other    Dr Murphy Williamson & Staff,    Ludwin Watts!   This is what we've been waiting for!!  Medicare Part D will soon be Omnipod friendly! ($$$)  I've been in touch with the Medicare Acc

## 2018-01-09 NOTE — PROGRESS NOTES
Pt to lab area, ambulatory with steady gait. Labs drawn via port. Pt needed to recline to obtain blood. Needle capped and left in place for CT scan today. No new complaints.  Discharged to ct and will return for needle removal.   1450 Returned to [place], po

## 2018-01-09 NOTE — TELEPHONE ENCOUNTER
Pt requesting to speak with either Nelson Orellana or Di Butt, no reason given, pls call at:[phone],thanks.

## 2018-01-09 NOTE — TELEPHONE ENCOUNTER
Medicare is going to cover Andrea Jj [address] - please submit please paperwork    Perry Bradley will be calling [phone] us to process    [phone] Main Medicare/Omni pod number

## 2018-01-09 NOTE — TELEPHONE ENCOUNTER
Please see patient's message. Not sure if you wanted her to forward information via fax or mail? I did let the patient know that we cannot accept personal emails. There are two encounters for this.  Linh Ni spoke with the patient this morning and a rep from

## 2018-01-10 ENCOUNTER — TELEPHONE (OUTPATIENT)
Dept: HEMATOLOGY/ONCOLOGY | Facility: HOSPITAL | Age: 58
End: 2018-01-10

## 2018-01-10 NOTE — TELEPHONE ENCOUNTER
Returned call to [employer]. She is sending exception form which will need to be completed by provider and sent to [employer]. Emailed patient back with update and will watch out for form.

## 2018-01-10 NOTE — TELEPHONE ENCOUNTER
Yahaira/Kenrick called to speak to RN about formulary exception for Medicare Part D. She is also faxing over form/cover letter to this office. Please call.

## 2018-01-12 NOTE — TELEPHONE ENCOUNTER
Received response from [employer]/ [employer]. PA was approved for Omni pods. Approved through 1/11/19. Approval scanned into chart.

## 2018-01-23 ENCOUNTER — OFFICE VISIT (OUTPATIENT)
Dept: HEMATOLOGY/ONCOLOGY | Facility: HOSPITAL | Age: 58
End: 2018-01-23
Attending: INTERNAL MEDICINE
Payer: MEDICARE

## 2018-01-23 VITALS
DIASTOLIC BLOOD PRESSURE: 74 MMHG | HEIGHT: 64 IN | BODY MASS INDEX: 35.68 KG/M2 | WEIGHT: 209 LBS | HEART RATE: 80 BPM | SYSTOLIC BLOOD PRESSURE: 137 MMHG | RESPIRATION RATE: 16 BRPM | TEMPERATURE: 99 F

## 2018-01-23 DIAGNOSIS — Z12.31 ENCOUNTER FOR SCREENING MAMMOGRAM FOR BREAST CANCER: Primary | ICD-10-CM

## 2018-01-23 DIAGNOSIS — C48.2 MALIGNANT NEOPLASM OF PERITONEUM (HCC): ICD-10-CM

## 2018-01-23 DIAGNOSIS — Z51.11 ENCOUNTER FOR CHEMOTHERAPY MANAGEMENT: ICD-10-CM

## 2018-01-23 DIAGNOSIS — K55.069 SUPERIOR MESENTERIC VEIN THROMBOSIS (HCC): ICD-10-CM

## 2018-01-23 DIAGNOSIS — C56.9 MALIGNANT NEOPLASM OF OVARY, UNSPECIFIED LATERALITY (HCC): ICD-10-CM

## 2018-01-23 PROCEDURE — 99212 OFFICE O/P EST SF 10 MIN: CPT | Performed by: INTERNAL MEDICINE

## 2018-01-23 PROCEDURE — 99215 OFFICE O/P EST HI 40 MIN: CPT | Performed by: INTERNAL MEDICINE

## 2018-01-23 NOTE — PROGRESS NOTES
Cancer Center Progress Note    Patient Name: Camron De León   YOB: 1960   Medical Record Number: [id number]   Attending Physician: Elena Staton M.D.      Chief Complaint:  F/u primary peritoneal carcinoma    History of Present Illness:  Aysha months of anticoagulation with rivaroxaban. Thrombus was resolved on subsequent imaging      Interval history:  She is returning for routine follow-up. She is doing well overall. She states she has gained some weight again. She denies abdominal pain. with primary anastomosis,                Mobilization of the splenic flexure, Low                anterior resection with colorectal anastomosis,               Hyperthermic intraperitoneal chemotherapy with                cisplatin and doxorubicin  07/21/20 3  •  insulin aspart (NOVOLOG) 100 UNIT/ML Subcutaneous Solution, Inject via insulin pump, maximum of 200 units daily, Disp: 18 vial, Rfl: 3  •  Loperamide HCl (IMODIUM A-D) 2 MG Oral Tab, Take 2 mg by mouth as needed for Diarrhea., Disp: , Rfl:   •  Gluco Cholelithiasis without CT evidence of acute complication. 4. There is a small left paramedian ventral abdominal willis-incisional hernia with minimal protrusion of a nonobstructed portion of the transverse colon.  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 Staining positive for ER. The patient is now on letrozole as of 10/24/2016. Her disease has responded and remained stable on letrozole . We will continue letrozole.       –return to clinic in 3 m

## 2018-01-26 ENCOUNTER — TELEPHONE (OUTPATIENT)
Dept: ENDOCRINOLOGY CLINIC | Facility: CLINIC | Age: 58
End: 2018-01-26

## 2018-01-26 NOTE — TELEPHONE ENCOUNTER
Contacted omnipod. They were not aware that we had received approval from Medicare. Faxed approval letter to them at [phone] and requested they contact us if anything additional needed.

## 2018-01-26 NOTE — TELEPHONE ENCOUNTER
Also see TE from 1/10/2018 - re:  Medicare Part D Formulary Exception - Duane Tobin following up on forms. Pls call. Thank you.

## 2018-01-30 ENCOUNTER — TELEPHONE (OUTPATIENT)
Dept: ENDOCRINOLOGY CLINIC | Facility: CLINIC | Age: 58
End: 2018-01-30

## 2018-01-30 NOTE — TELEPHONE ENCOUNTER
Medicare D form needs to be Resubmitted to [facility] - form needs to be edited to show correct product - omnipod 5 pack - code [id number]- pls re send to [facility]

## 2018-02-13 ENCOUNTER — TELEPHONE (OUTPATIENT)
Dept: ENDOCRINOLOGY CLINIC | Facility: CLINIC | Age: 58
End: 2018-02-13

## 2018-02-13 NOTE — TELEPHONE ENCOUNTER
Jayleen Mckenzie requesting to speak with RN re: Formulary Exception Request for Omnipods. Pls call. Thank you.

## 2018-02-13 NOTE — TELEPHONE ENCOUNTER
Returned call to [place]. We did received second approval for omnipod supplies through [facility]. Let her know approval letter just states [facility] but states approved through 2/2/2019- see approval scanned into chart.  She is requesting copy of this le

## 2018-02-20 RX ORDER — CITALOPRAM 10 MG/1
10 TABLET ORAL DAILY
Qty: 90 TABLET | Refills: 3 | Status: SHIPPED
Start: 2018-02-20 | End: 2019-01-03

## 2018-02-20 NOTE — TELEPHONE ENCOUNTER
Current Outpatient Prescriptions:  carvedilol 12.5 MG Oral Tab TAKE 1 TABLET BY MOUTH 2 (TWO) TIMES DAILY WITH MEALS.  Disp: 180 tablet Rfl: 1   insulin aspart (NOVOLOG) 100 UNIT/ML Subcutaneous Solution INJECT VIA INSULIN PUMP, MAXIMUM  UNITS DAILY

## 2018-02-21 ENCOUNTER — PATIENT MESSAGE (OUTPATIENT)
Dept: ENDOCRINOLOGY CLINIC | Facility: CLINIC | Age: 58
End: 2018-02-21

## 2018-02-21 RX ORDER — INSULIN ASPART 100 [IU]/ML
INJECTION, SOLUTION INTRAVENOUS; SUBCUTANEOUS
Qty: 18 VIAL | Refills: 3 | Status: SHIPPED | OUTPATIENT
Start: 2018-02-21 | End: 2018-08-29

## 2018-02-21 RX ORDER — CARVEDILOL 12.5 MG/1
TABLET ORAL
Qty: 180 TABLET | Refills: 1 | Status: SHIPPED | OUTPATIENT
Start: 2018-02-21 | End: 2018-08-02

## 2018-02-21 RX ORDER — ATORVASTATIN CALCIUM 40 MG/1
40 TABLET, FILM COATED ORAL NIGHTLY
Qty: 90 TABLET | Refills: 3 | Status: SHIPPED | OUTPATIENT
Start: 2018-02-21 | End: 2018-02-21

## 2018-02-21 RX ORDER — ATORVASTATIN CALCIUM 40 MG/1
40 TABLET, FILM COATED ORAL NIGHTLY
Qty: 90 TABLET | Refills: 1 | Status: SHIPPED | OUTPATIENT
Start: 2018-02-21 | End: 2018-08-29

## 2018-02-21 RX ORDER — INSULIN ASPART 100 [IU]/ML
INJECTION, SOLUTION INTRAVENOUS; SUBCUTANEOUS
Qty: 180 ML | Refills: 1 | Status: SHIPPED | OUTPATIENT
Start: 2018-02-21 | End: 2018-04-26

## 2018-02-21 RX ORDER — CARVEDILOL 12.5 MG/1
TABLET ORAL
Qty: 180 TABLET | Refills: 1 | Status: SHIPPED | OUTPATIENT
Start: 2018-02-21 | End: 2018-02-21

## 2018-02-21 NOTE — TELEPHONE ENCOUNTER
From: Amrit Castaneda  To: Eva Leo MD  Sent: 2/21/2018 3:24 PM CST  Subject: Prescription Question    Hello! February 21 @ 3:15PM    What an ordeal this pharmacy mail catalog has turned out to be!   The order which your office ok'd was cancelled by Ca

## 2018-02-27 ENCOUNTER — PATIENT MESSAGE (OUTPATIENT)
Dept: ENDOCRINOLOGY CLINIC | Facility: CLINIC | Age: 58
End: 2018-02-27

## 2018-02-27 ENCOUNTER — TELEPHONE (OUTPATIENT)
Dept: ENDOCRINOLOGY CLINIC | Facility: CLINIC | Age: 58
End: 2018-02-27

## 2018-02-27 NOTE — TELEPHONE ENCOUNTER
From: Kristie العراقي  To: Emerlad Moralez MD  Sent: 2/27/2018 12:04 PM CST  Subject: Prescription Question    Hello,  My problems with [employer] continue!     I have yet to receive my Novolog prescription after numerous phone calls & a whopping $59

## 2018-02-27 NOTE — TELEPHONE ENCOUNTER
Please call [facility] to clarify RX at [phone]. RX # is [id number].       Current Outpatient Prescriptions:   •  insulin aspart (NOVOLOG) 100 UNIT/ML Subcutaneous Solution, Inject via insulin pump, maximum of 200 units daily, Disp: 18 vial, Rfl: 3

## 2018-02-27 NOTE — TELEPHONE ENCOUNTER
Spoke with pharm and verified Novolog script was ok and to be processed. Hayley Barnett pharmacist states she will process order today. Sent patient my chart message to inform.

## 2018-04-26 ENCOUNTER — OFFICE VISIT (OUTPATIENT)
Dept: HEMATOLOGY/ONCOLOGY | Facility: HOSPITAL | Age: 58
End: 2018-04-26
Attending: INTERNAL MEDICINE
Payer: MEDICARE

## 2018-04-26 ENCOUNTER — HOSPITAL ENCOUNTER (OUTPATIENT)
Dept: MAMMOGRAPHY | Facility: HOSPITAL | Age: 58
Discharge: HOME | End: 2018-04-26
Attending: INTERNAL MEDICINE
Payer: MEDICARE

## 2018-04-26 VITALS
SYSTOLIC BLOOD PRESSURE: 142 MMHG | HEIGHT: 64 IN | TEMPERATURE: 98 F | HEART RATE: 82 BPM | RESPIRATION RATE: 16 BRPM | DIASTOLIC BLOOD PRESSURE: 80 MMHG | WEIGHT: 208 LBS | BODY MASS INDEX: 35.51 KG/M2

## 2018-04-26 DIAGNOSIS — K55.069 SUPERIOR MESENTERIC VEIN THROMBOSIS (HCC): ICD-10-CM

## 2018-04-26 DIAGNOSIS — M25.561 ARTHRALGIA OF BOTH KNEES: ICD-10-CM

## 2018-04-26 DIAGNOSIS — Z45.2 ENCOUNTER FOR ADJUSTMENT OR MANAGEMENT OF VASCULAR ACCESS DEVICE: Primary | ICD-10-CM

## 2018-04-26 DIAGNOSIS — C48.2 MALIGNANT NEOPLASM OF PERITONEUM (HCC): Primary | ICD-10-CM

## 2018-04-26 DIAGNOSIS — Z12.31 ENCOUNTER FOR SCREENING MAMMOGRAM FOR BREAST CANCER: ICD-10-CM

## 2018-04-26 DIAGNOSIS — Z51.11 ENCOUNTER FOR CHEMOTHERAPY MANAGEMENT: ICD-10-CM

## 2018-04-26 DIAGNOSIS — M25.562 ARTHRALGIA OF BOTH KNEES: ICD-10-CM

## 2018-04-26 DIAGNOSIS — C48.2 MALIGNANT NEOPLASM OF PERITONEUM (HCC): ICD-10-CM

## 2018-04-26 PROCEDURE — 77067 SCR MAMMO BI INCL CAD: CPT | Performed by: INTERNAL MEDICINE

## 2018-04-26 PROCEDURE — 85025 COMPLETE CBC W/AUTO DIFF WBC: CPT

## 2018-04-26 PROCEDURE — 80053 COMPREHEN METABOLIC PANEL: CPT

## 2018-04-26 PROCEDURE — 99215 OFFICE O/P EST HI 40 MIN: CPT | Performed by: INTERNAL MEDICINE

## 2018-04-26 PROCEDURE — 36591 DRAW BLOOD OFF VENOUS DEVICE: CPT

## 2018-04-26 PROCEDURE — A4216 STERILE WATER/SALINE, 10 ML: HCPCS

## 2018-04-26 RX ORDER — HEPARIN SODIUM (PORCINE) LOCK FLUSH IV SOLN 100 UNIT/ML 100 UNIT/ML
5 SOLUTION INTRAVENOUS ONCE
Status: COMPLETED | OUTPATIENT
Start: 2018-04-26 | End: 2018-04-26

## 2018-04-26 RX ORDER — HEPARIN SODIUM (PORCINE) LOCK FLUSH IV SOLN 100 UNIT/ML 100 UNIT/ML
SOLUTION INTRAVENOUS
Status: COMPLETED
Start: 2018-04-26 | End: 2018-04-26

## 2018-04-26 RX ORDER — 0.9 % SODIUM CHLORIDE 0.9 %
VIAL (ML) INJECTION
Status: DISCONTINUED
Start: 2018-04-26 | End: 2018-04-26

## 2018-04-26 RX ORDER — HEPARIN SODIUM (PORCINE) LOCK FLUSH IV SOLN 100 UNIT/ML 100 UNIT/ML
5 SOLUTION INTRAVENOUS ONCE
Status: CANCELLED | OUTPATIENT
Start: 2018-04-26

## 2018-04-26 RX ORDER — 0.9 % SODIUM CHLORIDE 0.9 %
10 VIAL (ML) INJECTION ONCE
Status: CANCELLED | OUTPATIENT
Start: 2018-04-26

## 2018-04-26 RX ADMIN — HEPARIN SODIUM (PORCINE) LOCK FLUSH IV SOLN 100 UNIT/ML 500 UNITS: 100 SOLUTION INTRAVENOUS at 13:17:00

## 2018-04-26 NOTE — PROGRESS NOTES
Cancer Center Progress Note    Patient Name: David Sosa   YOB: 1960   Medical Record Number: [id number]   Attending Physician: Gurinder Padilla M.D.      Chief Complaint:  F/u primary peritoneal carcinoma    History of Present Illness:  Aysha months of anticoagulation with rivaroxaban. Thrombus was resolved on subsequent imaging      Interval history:  She is returning for routine follow-up. She is doing well overall. She states she has gained some weight again. She denies abdominal pain. with primary anastomosis,                Mobilization of the splenic flexure, Low                anterior resection with colorectal anastomosis,               Hyperthermic intraperitoneal chemotherapy with                cisplatin and doxorubicin  07/21/20 2  •  Loperamide HCl (IMODIUM A-D) 2 MG Oral Tab, Take 2 mg by mouth as needed for Diarrhea., Disp: , Rfl:   •  Glucose Blood (FREESTYLE TEST) In Vitro Strip, Test blood glucose level 5 times per day, Disp: 200 each, Rfl: 11  •  diphenoxylate-atropine (LOM significant interval change in multifocal enhancing peritoneal implants. 2. Hepatic steatosis. 3. Cholelithiasis without CT evidence of acute complication.      4. There is a small left paramedian ventral abdominal willis-incisional hernia with minima in August 2016  --Evaluated by Dr Kristin Long at [facility]. 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 Staining positive for ER. The patient is now on letrozole as of 10/24/2016.   Her disease

## 2018-04-26 NOTE — PROGRESS NOTES
Patient arrives for port flush and labs prior to MD visit. Port accessed using sterile technique, positive blood return noted. Labs collected. Port flushed with saline and heparin, de accessed, site covered with 2x2 and paper tape.  Patient discharged to wa

## 2018-04-30 ENCOUNTER — TELEPHONE (OUTPATIENT)
Dept: HEMATOLOGY/ONCOLOGY | Facility: HOSPITAL | Age: 58
End: 2018-04-30

## 2018-05-01 NOTE — TELEPHONE ENCOUNTER
Called Susan Zelaya back, she said that she is in the process of getting her social security disability paperwork completed and said we should be getting some information from them soon.   She asked if we could complete it once we receive it, I let her know that

## 2018-06-05 ENCOUNTER — NURSE ONLY (OUTPATIENT)
Dept: HEMATOLOGY/ONCOLOGY | Facility: HOSPITAL | Age: 58
End: 2018-06-05
Attending: INTERNAL MEDICINE
Payer: MEDICARE

## 2018-06-05 ENCOUNTER — HOSPITAL ENCOUNTER (OUTPATIENT)
Dept: CT IMAGING | Facility: HOSPITAL | Age: 58
Discharge: HOME | End: 2018-06-05
Attending: INTERNAL MEDICINE
Payer: MEDICARE

## 2018-06-05 DIAGNOSIS — Z45.2 ENCOUNTER FOR ADJUSTMENT OR MANAGEMENT OF VASCULAR ACCESS DEVICE: Primary | ICD-10-CM

## 2018-06-05 DIAGNOSIS — C48.2 MALIGNANT NEOPLASM OF PERITONEUM (HCC): ICD-10-CM

## 2018-06-05 LAB — CREAT BLD-MCNC: 0.8 MG/DL (ref 0.5–1.5)

## 2018-06-05 PROCEDURE — 80053 COMPREHEN METABOLIC PANEL: CPT

## 2018-06-05 PROCEDURE — 85025 COMPLETE CBC W/AUTO DIFF WBC: CPT

## 2018-06-05 PROCEDURE — 71260 CT THORAX DX C+: CPT | Performed by: INTERNAL MEDICINE

## 2018-06-05 PROCEDURE — A4216 STERILE WATER/SALINE, 10 ML: HCPCS

## 2018-06-05 PROCEDURE — 74177 CT ABD & PELVIS W/CONTRAST: CPT | Performed by: INTERNAL MEDICINE

## 2018-06-05 PROCEDURE — 36591 DRAW BLOOD OFF VENOUS DEVICE: CPT

## 2018-06-05 PROCEDURE — 82565 ASSAY OF CREATININE: CPT

## 2018-06-05 RX ORDER — 0.9 % SODIUM CHLORIDE 0.9 %
VIAL (ML) INJECTION
Status: DISCONTINUED
Start: 2018-06-05 | End: 2018-06-05

## 2018-06-05 RX ORDER — 0.9 % SODIUM CHLORIDE 0.9 %
10 VIAL (ML) INJECTION ONCE
Status: CANCELLED | OUTPATIENT
Start: 2018-06-05

## 2018-06-05 RX ORDER — HEPARIN SODIUM (PORCINE) LOCK FLUSH IV SOLN 100 UNIT/ML 100 UNIT/ML
SOLUTION INTRAVENOUS
Status: DISCONTINUED
Start: 2018-06-05 | End: 2018-06-05

## 2018-06-05 RX ORDER — HEPARIN SODIUM (PORCINE) LOCK FLUSH IV SOLN 100 UNIT/ML 100 UNIT/ML
5 SOLUTION INTRAVENOUS ONCE
Status: CANCELLED | OUTPATIENT
Start: 2018-06-05

## 2018-06-05 RX ORDER — HEPARIN SODIUM (PORCINE) LOCK FLUSH IV SOLN 100 UNIT/ML 100 UNIT/ML
5 SOLUTION INTRAVENOUS ONCE
Status: DISCONTINUED | OUTPATIENT
Start: 2018-06-05 | End: 2018-06-05

## 2018-06-05 NOTE — PROGRESS NOTES
Patient to center for labs and prot access for CT scan. Prot accessed , good blood return, albs collected, port  flushed and saline locked.    discharged stable to imaging department    1320 patient arrived back to department after CT scan to de access pro

## 2018-06-07 ENCOUNTER — APPOINTMENT (OUTPATIENT)
Dept: HEMATOLOGY/ONCOLOGY | Facility: HOSPITAL | Age: 58
End: 2018-06-07
Attending: INTERNAL MEDICINE
Payer: MEDICARE

## 2018-06-11 ENCOUNTER — OFFICE VISIT (OUTPATIENT)
Dept: HEMATOLOGY/ONCOLOGY | Facility: HOSPITAL | Age: 58
End: 2018-06-11
Attending: INTERNAL MEDICINE
Payer: MEDICARE

## 2018-06-11 VITALS
BODY MASS INDEX: 35.17 KG/M2 | HEART RATE: 78 BPM | DIASTOLIC BLOOD PRESSURE: 71 MMHG | SYSTOLIC BLOOD PRESSURE: 122 MMHG | HEIGHT: 64 IN | WEIGHT: 206 LBS | RESPIRATION RATE: 16 BRPM | TEMPERATURE: 98 F

## 2018-06-11 DIAGNOSIS — Z51.11 ENCOUNTER FOR CHEMOTHERAPY MANAGEMENT: ICD-10-CM

## 2018-06-11 DIAGNOSIS — C48.2 MALIGNANT NEOPLASM OF PERITONEUM (HCC): Primary | ICD-10-CM

## 2018-06-11 DIAGNOSIS — K55.069 SUPERIOR MESENTERIC VEIN THROMBOSIS (HCC): ICD-10-CM

## 2018-06-11 DIAGNOSIS — M25.562 ARTHRALGIA OF BOTH KNEES: ICD-10-CM

## 2018-06-11 DIAGNOSIS — M25.561 ARTHRALGIA OF BOTH KNEES: ICD-10-CM

## 2018-06-11 PROCEDURE — 99214 OFFICE O/P EST MOD 30 MIN: CPT | Performed by: INTERNAL MEDICINE

## 2018-06-11 RX ORDER — LETROZOLE 2.5 MG/1
2.5 TABLET, FILM COATED ORAL DAILY
Qty: 90 TABLET | Refills: 2 | Status: SHIPPED | OUTPATIENT
Start: 2018-06-11 | End: 2018-07-16

## 2018-06-11 NOTE — PROGRESS NOTES
Cancer Center Progress Note    Patient Name: Chandrakant Hdez   YOB: 1960   Medical Record Number: [id number]   Attending Physician: Tula Severs, M.D.      Chief Complaint:  F/u primary peritoneal carcinoma    History of Present Illness:  Aysha months of anticoagulation with rivaroxaban. Thrombus was resolved on subsequent imaging      Interval history:  She is returning for routine follow-up. She is doing well overall. She states she has gained some weight again. She denies abdominal pain. partial resection of                abdominal wall (8 cm), and omentectomy,  Small                bowel resection with primary anastomosis,                Mobilization of the splenic flexure, Low                anterior resection with colorectal anastomosi (10 mg total) by mouth daily. , Disp: 90 tablet, Rfl: 3  •  LETROZOLE 2.5 MG Oral Tab, TAKE 1 TABLET (2.5 MG TOTAL) BY MOUTH DAILY. , Disp: 90 tablet, Rfl: 2  •  Loperamide HCl (IMODIUM A-D) 2 MG Oral Tab, Take 2 mg by mouth as needed for Diarrhea., Disp: , >60  >60   CA  9.0   --    ALB  4.1   --    NA  134*   --    K  4.0   --    CL  95   --    CO2  28   --    ALKPHO  70   --    AST  64*   --    ALT  61*   --    BILT  2.4*   --    TP  6.7   --          Radiology:  CT CAP 1/9/18  1.  Overall stable examinatio had reversal of the ileostomy by Dr. Pinky Joyce.    --imaging 8/15 showed progression of peritoneal disease and the patient was started on chemotherapy w/ topotecan and bevacizumab 8/2015  --Received 12 cycles of topotecan and bevacizumab --clear progression on

## 2018-07-16 RX ORDER — LETROZOLE 2.5 MG/1
2.5 TABLET, FILM COATED ORAL DAILY
Qty: 90 TABLET | Refills: 2 | Status: SHIPPED | OUTPATIENT
Start: 2018-07-16 | End: 2019-01-03

## 2018-08-02 RX ORDER — CARVEDILOL 12.5 MG/1
TABLET ORAL
Qty: 180 TABLET | Refills: 0 | Status: SHIPPED | OUTPATIENT
Start: 2018-08-02 | End: 2018-11-09

## 2018-08-16 ENCOUNTER — TELEPHONE (OUTPATIENT)
Dept: HEMATOLOGY/ONCOLOGY | Facility: HOSPITAL | Age: 58
End: 2018-08-16

## 2018-08-16 NOTE — TELEPHONE ENCOUNTER
Called Alexandria back, advised her to check w PCP regarding lipid panel as Dr Michelle Augustine does not order these labs.   She verbalized understanding

## 2018-08-16 NOTE — TELEPHONE ENCOUNTER
Patient has an appt for labs at cancer center and follow up with  Sept 19. She was asking if he could order a lipid panel as well. Dr. Aide Escamilla has ordered it in the past but patient doesn't know if she can get in there to see them before our appt.

## 2018-08-28 ENCOUNTER — PATIENT MESSAGE (OUTPATIENT)
Dept: ENDOCRINOLOGY CLINIC | Facility: CLINIC | Age: 58
End: 2018-08-28

## 2018-08-29 ENCOUNTER — OFFICE VISIT (OUTPATIENT)
Dept: ENDOCRINOLOGY CLINIC | Facility: CLINIC | Age: 58
End: 2018-08-29
Payer: MEDICARE

## 2018-08-29 VITALS
SYSTOLIC BLOOD PRESSURE: 133 MMHG | DIASTOLIC BLOOD PRESSURE: 78 MMHG | BODY MASS INDEX: 35 KG/M2 | HEART RATE: 89 BPM | WEIGHT: 205 LBS

## 2018-08-29 DIAGNOSIS — E11.8 UNCONTROLLED TYPE 2 DIABETES MELLITUS WITH COMPLICATION, WITH LONG-TERM CURRENT USE OF INSULIN (HCC): Primary | ICD-10-CM

## 2018-08-29 DIAGNOSIS — E11.65 UNCONTROLLED TYPE 2 DIABETES MELLITUS WITH COMPLICATION, WITH LONG-TERM CURRENT USE OF INSULIN (HCC): Primary | ICD-10-CM

## 2018-08-29 DIAGNOSIS — Z79.4 UNCONTROLLED TYPE 2 DIABETES MELLITUS WITH COMPLICATION, WITH LONG-TERM CURRENT USE OF INSULIN (HCC): Primary | ICD-10-CM

## 2018-08-29 PROCEDURE — G0463 HOSPITAL OUTPT CLINIC VISIT: HCPCS | Performed by: INTERNAL MEDICINE

## 2018-08-29 PROCEDURE — 99214 OFFICE O/P EST MOD 30 MIN: CPT | Performed by: INTERNAL MEDICINE

## 2018-08-29 RX ORDER — ATORVASTATIN CALCIUM 40 MG/1
40 TABLET, FILM COATED ORAL NIGHTLY
Qty: 90 TABLET | Refills: 1 | Status: SHIPPED | OUTPATIENT
Start: 2018-08-29 | End: 2019-07-17

## 2018-08-29 RX ORDER — INSULIN ASPART 100 [IU]/ML
INJECTION, SOLUTION INTRAVENOUS; SUBCUTANEOUS
Qty: 18 VIAL | Refills: 3 | Status: SHIPPED | OUTPATIENT
Start: 2018-08-29 | End: 2018-11-12

## 2018-08-29 NOTE — TELEPHONE ENCOUNTER
Per patient request called in refill for Omnipod pods. She gets 6 boxes of 5 pods/box for 90 day supply. Changing site every 72 hours. Informed patient.      For reference: NDC for product through [employer]) is [id number]

## 2018-08-29 NOTE — TELEPHONE ENCOUNTER
Received call from the patient. She has an appt with Dr. Lazarus Rodas later today. She wanted to let Dr. Lazarus Rodas and Jaqui Auguste know the following: If a fax from [employer] is received in [place] please disregard and do not process.  She will give further details a

## 2018-08-29 NOTE — PROGRESS NOTES
Name: Theodore Bourne  Date: 8/29/2018    Referring Physician: No ref. provider found    HISTORY OF PRESENT ILLNESS   Theodore Bourne is a 61year old female who presents for diabetes mellitus.   She is now maintained on letrozole for ovarian metastatic CA, TEST) In Vitro Strip, Test blood glucose level 5 times per day, Disp: 200 each, Rfl: 11  •  diphenoxylate-atropine (LOMOTIL) 2.5-0.025 MG Oral Tab, Take 1 tablet by mouth 4 (four) times daily as needed for Diarrhea., Disp: 360 tablet, Rfl: 1     Allergies: impairment     glasses for distance and reading       Surgical history:   Past Surgical History:  12/2013: APPENDECTOMY  No date: BREAST BIOPSY Left      Comment: x's 2 benign  1999: HYSTERECTOMY      Comment: OSIEL/BSO   No date: PAIRSH NEEDLE LOCALIZATION W/ controlled  -controlled, HgA1c 9.1% -->significant increase    -Discussed importance of glycemic control to prevent complications of diabetes  -Discussed complications of diabetes include retinopathy, neuropathy, nephropathy and cardiovascular disease  -Ed [place]

## 2018-08-29 NOTE — TELEPHONE ENCOUNTER
From: Cameron Avila  To:  Herminio Simons MD  Sent: 8/28/2018 8:17 PM CDT  Subject: Other    Good Morning,     I have an appointment with Dr Whit Peralta 8/29 at 1.15 pm.    Can you please make a notation in my chart—   Pneumonia shot, 2 series complete by 9/28 an

## 2018-09-18 ENCOUNTER — TELEPHONE (OUTPATIENT)
Dept: HEMATOLOGY/ONCOLOGY | Facility: HOSPITAL | Age: 58
End: 2018-09-18

## 2018-09-18 NOTE — TELEPHONE ENCOUNTER
Niko Meza is requesting a call back. She cancelled her Follow Up Visit for 9/19 \" I have not had my CT done. The order is not in the computer. Please have Jose call me with instructions. \"

## 2018-09-19 ENCOUNTER — NURSE ONLY (OUTPATIENT)
Dept: HEMATOLOGY/ONCOLOGY | Facility: HOSPITAL | Age: 58
End: 2018-09-19
Attending: INTERNAL MEDICINE
Payer: MEDICARE

## 2018-09-19 VITALS
HEIGHT: 64 IN | HEART RATE: 74 BPM | SYSTOLIC BLOOD PRESSURE: 158 MMHG | RESPIRATION RATE: 18 BRPM | BODY MASS INDEX: 34.83 KG/M2 | WEIGHT: 204 LBS | TEMPERATURE: 98 F | DIASTOLIC BLOOD PRESSURE: 80 MMHG

## 2018-09-19 DIAGNOSIS — M25.561 ARTHRALGIA OF BOTH KNEES: ICD-10-CM

## 2018-09-19 DIAGNOSIS — E11.8 UNCONTROLLED TYPE 2 DIABETES MELLITUS WITH COMPLICATION, WITH LONG-TERM CURRENT USE OF INSULIN (HCC): ICD-10-CM

## 2018-09-19 DIAGNOSIS — Z51.11 ENCOUNTER FOR CHEMOTHERAPY MANAGEMENT: ICD-10-CM

## 2018-09-19 DIAGNOSIS — C48.2 MALIGNANT NEOPLASM OF PERITONEUM (HCC): ICD-10-CM

## 2018-09-19 DIAGNOSIS — Z45.2 ENCOUNTER FOR ADJUSTMENT OR MANAGEMENT OF VASCULAR ACCESS DEVICE: Primary | ICD-10-CM

## 2018-09-19 DIAGNOSIS — K55.069 SUPERIOR MESENTERIC VEIN THROMBOSIS (HCC): ICD-10-CM

## 2018-09-19 DIAGNOSIS — Z79.4 UNCONTROLLED TYPE 2 DIABETES MELLITUS WITH COMPLICATION, WITH LONG-TERM CURRENT USE OF INSULIN (HCC): ICD-10-CM

## 2018-09-19 DIAGNOSIS — M25.562 ARTHRALGIA OF BOTH KNEES: ICD-10-CM

## 2018-09-19 DIAGNOSIS — E11.65 UNCONTROLLED TYPE 2 DIABETES MELLITUS WITH COMPLICATION, WITH LONG-TERM CURRENT USE OF INSULIN (HCC): ICD-10-CM

## 2018-09-19 DIAGNOSIS — C48.2 MALIGNANT NEOPLASM OF PERITONEUM (HCC): Primary | ICD-10-CM

## 2018-09-19 LAB
ALBUMIN SERPL BCP-MCNC: 4 G/DL (ref 3.5–4.8)
ALBUMIN/GLOB SERPL: 1.4 {RATIO} (ref 1–2)
ALP SERPL-CCNC: 71 U/L (ref 32–100)
ALT SERPL-CCNC: 59 U/L (ref 14–54)
ANION GAP SERPL CALC-SCNC: 7 MMOL/L (ref 0–18)
AST SERPL-CCNC: 64 U/L (ref 15–41)
BASOPHILS # BLD: 0 K/UL (ref 0–0.2)
BASOPHILS NFR BLD: 1 %
BILIRUB SERPL-MCNC: 2 MG/DL (ref 0.3–1.2)
BUN SERPL-MCNC: 11 MG/DL (ref 8–20)
BUN/CREAT SERPL: 13.1 (ref 10–20)
CALCIUM SERPL-MCNC: 8.9 MG/DL (ref 8.5–10.5)
CHLORIDE SERPL-SCNC: 100 MMOL/L (ref 95–110)
CHOLEST SERPL-MCNC: 103 MG/DL (ref 110–200)
CO2 SERPL-SCNC: 28 MMOL/L (ref 22–32)
CREAT SERPL-MCNC: 0.84 MG/DL (ref 0.5–1.5)
CREAT UR-MCNC: 135.5 MG/DL
EOSINOPHIL # BLD: 0.1 K/UL (ref 0–0.7)
EOSINOPHIL NFR BLD: 2 %
ERYTHROCYTE [DISTWIDTH] IN BLOOD BY AUTOMATED COUNT: 13.5 % (ref 11–15)
GLOBULIN PLAS-MCNC: 2.8 G/DL (ref 2.5–3.7)
GLUCOSE SERPL-MCNC: 256 MG/DL (ref 70–99)
HCT VFR BLD AUTO: 40.8 % (ref 35–48)
HDLC SERPL-MCNC: 47 MG/DL
HGB BLD-MCNC: 14.1 G/DL (ref 12–16)
LDLC SERPL CALC-MCNC: 42 MG/DL (ref 0–99)
LYMPHOCYTES # BLD: 1.2 K/UL (ref 1–4)
LYMPHOCYTES NFR BLD: 27 %
MCH RBC QN AUTO: 30.8 PG (ref 27–32)
MCHC RBC AUTO-ENTMCNC: 34.5 G/DL (ref 32–37)
MCV RBC AUTO: 89.2 FL (ref 80–100)
MICROALBUMIN UR-MCNC: 0.8 MG/DL (ref 0–1.8)
MICROALBUMIN/CREAT UR: 5.9 MG/G{CREAT} (ref 0–20)
MONOCYTES # BLD: 0.3 K/UL (ref 0–1)
MONOCYTES NFR BLD: 7 %
NEUTROPHILS # BLD AUTO: 2.7 K/UL (ref 1.8–7.7)
NEUTROPHILS NFR BLD: 63 %
NONHDLC SERPL-MCNC: 56 MG/DL
OSMOLALITY UR CALC.SUM OF ELEC: 288 MOSM/KG (ref 275–295)
PATIENT FASTING: YES
PLATELET # BLD AUTO: 149 K/UL (ref 140–400)
PMV BLD AUTO: 9.7 FL (ref 7.4–10.3)
POTASSIUM SERPL-SCNC: 3.8 MMOL/L (ref 3.3–5.1)
PROT SERPL-MCNC: 6.8 G/DL (ref 5.9–8.4)
RBC # BLD AUTO: 4.58 M/UL (ref 3.7–5.4)
SODIUM SERPL-SCNC: 135 MMOL/L (ref 136–144)
T4 FREE SERPL-MCNC: 0.83 NG/DL (ref 0.58–1.64)
TRIGL SERPL-MCNC: 72 MG/DL (ref 1–149)
TSH SERPL-ACNC: 3.12 UIU/ML (ref 0.45–5.33)
WBC # BLD AUTO: 4.2 K/UL (ref 4–11)

## 2018-09-19 PROCEDURE — 80053 COMPREHEN METABOLIC PANEL: CPT

## 2018-09-19 PROCEDURE — 82043 UR ALBUMIN QUANTITATIVE: CPT

## 2018-09-19 PROCEDURE — 85025 COMPLETE CBC W/AUTO DIFF WBC: CPT

## 2018-09-19 PROCEDURE — A4216 STERILE WATER/SALINE, 10 ML: HCPCS

## 2018-09-19 PROCEDURE — 84443 ASSAY THYROID STIM HORMONE: CPT

## 2018-09-19 PROCEDURE — 82570 ASSAY OF URINE CREATININE: CPT

## 2018-09-19 PROCEDURE — 99215 OFFICE O/P EST HI 40 MIN: CPT | Performed by: INTERNAL MEDICINE

## 2018-09-19 PROCEDURE — 84439 ASSAY OF FREE THYROXINE: CPT

## 2018-09-19 PROCEDURE — 80061 LIPID PANEL: CPT

## 2018-09-19 PROCEDURE — 36591 DRAW BLOOD OFF VENOUS DEVICE: CPT

## 2018-09-19 RX ORDER — 0.9 % SODIUM CHLORIDE 0.9 %
VIAL (ML) INJECTION
Status: DISCONTINUED
Start: 2018-09-19 | End: 2018-09-19

## 2018-09-19 RX ORDER — 0.9 % SODIUM CHLORIDE 0.9 %
10 VIAL (ML) INJECTION ONCE
Status: DISCONTINUED | OUTPATIENT
Start: 2018-09-19 | End: 2018-09-19

## 2018-09-19 RX ORDER — HEPARIN SODIUM (PORCINE) LOCK FLUSH IV SOLN 100 UNIT/ML 100 UNIT/ML
5 SOLUTION INTRAVENOUS ONCE
Status: COMPLETED | OUTPATIENT
Start: 2018-09-19 | End: 2018-09-19

## 2018-09-19 RX ORDER — HEPARIN SODIUM (PORCINE) LOCK FLUSH IV SOLN 100 UNIT/ML 100 UNIT/ML
5 SOLUTION INTRAVENOUS ONCE
Status: CANCELLED | OUTPATIENT
Start: 2018-09-19

## 2018-09-19 RX ORDER — HEPARIN SODIUM (PORCINE) LOCK FLUSH IV SOLN 100 UNIT/ML 100 UNIT/ML
SOLUTION INTRAVENOUS
Status: COMPLETED
Start: 2018-09-19 | End: 2018-09-19

## 2018-09-19 RX ORDER — 0.9 % SODIUM CHLORIDE 0.9 %
10 VIAL (ML) INJECTION ONCE
Status: CANCELLED | OUTPATIENT
Start: 2018-09-19

## 2018-09-19 RX ADMIN — HEPARIN SODIUM (PORCINE) LOCK FLUSH IV SOLN 100 UNIT/ML 500 UNITS: 100 SOLUTION INTRAVENOUS at 09:37:00

## 2018-09-19 NOTE — PROGRESS NOTES
Patient to center for labs and port flush prior to MD visit.   Port accessed , good blood return noted labs collected - for Dr. Renae Carrera and Dr. Jose Batres- patient states she has been fasting, port  flushed with saline and heparin  de accessed 2 x 2 and paper tape

## 2018-09-20 ENCOUNTER — APPOINTMENT (OUTPATIENT)
Dept: HEMATOLOGY/ONCOLOGY | Facility: HOSPITAL | Age: 58
End: 2018-09-20
Attending: INTERNAL MEDICINE
Payer: MEDICARE

## 2018-11-01 ENCOUNTER — TELEPHONE (OUTPATIENT)
Dept: ENDOCRINOLOGY CLINIC | Facility: CLINIC | Age: 58
End: 2018-11-01

## 2018-11-01 NOTE — TELEPHONE ENCOUNTER
Patient asking if we can start paperwork for Dexcom. Would we be able to addend OV note from 8/30 for testing frequency?

## 2018-11-09 ENCOUNTER — TELEPHONE (OUTPATIENT)
Dept: ENDOCRINOLOGY CLINIC | Facility: CLINIC | Age: 58
End: 2018-11-09

## 2018-11-09 ENCOUNTER — PATIENT MESSAGE (OUTPATIENT)
Dept: ENDOCRINOLOGY CLINIC | Facility: CLINIC | Age: 58
End: 2018-11-09

## 2018-11-09 RX ORDER — CARVEDILOL 12.5 MG/1
TABLET ORAL
Qty: 180 TABLET | Refills: 1 | Status: SHIPPED | OUTPATIENT
Start: 2018-11-09 | End: 2019-01-03

## 2018-11-09 NOTE — TELEPHONE ENCOUNTER
Current Outpatient Medications:  carvedilol 12.5 MG Oral Tab TAKE 1 TABLET BY MOUTH 2 (TWO) TIMES DAILY WITH MEALS.  Disp: 180 tablet Rfl: 0     refill

## 2018-11-12 RX ORDER — INSULIN ASPART 100 [IU]/ML
INJECTION, SOLUTION INTRAVENOUS; SUBCUTANEOUS
Qty: 18 VIAL | Refills: 3 | Status: SHIPPED | OUTPATIENT
Start: 2018-11-12 | End: 2019-01-03

## 2018-11-12 NOTE — TELEPHONE ENCOUNTER
Contacted [facility]. Prescription sent out yesterday and should arrive to patient today. Per [facility] they will need new prescription for next refill. Pended.  LOV 8/29/2018

## 2018-11-12 NOTE — TELEPHONE ENCOUNTER
From: Belkys Ford  To: Ruy Lara MD  Sent: 11/9/2018 6:49 PM CST  Subject: Prescription Question    Hi Dr Curt Sweet,  I just received this text from my mail order prescription service. .. [employer] - [employer]: We received Order #[id number] for 1 prescription

## 2018-11-15 ENCOUNTER — TELEPHONE (OUTPATIENT)
Dept: ENDOCRINOLOGY CLINIC | Facility: CLINIC | Age: 58
End: 2018-11-15

## 2018-11-15 NOTE — TELEPHONE ENCOUNTER
Pt states that she has a new insulin pump and needs to get parameters. Please call after 3:15pm if possible.

## 2018-11-21 ENCOUNTER — TELEPHONE (OUTPATIENT)
Dept: ENDOCRINOLOGY CLINIC | Facility: CLINIC | Age: 58
End: 2018-11-21

## 2018-11-21 NOTE — TELEPHONE ENCOUNTER
Pt requesting to desiree rose rn, pt informed  [facility] not in office this week. Requesting I transfer to rn, if not pls call at:[phone],thanks.   *Insulin pump/PDM

## 2018-11-21 NOTE — TELEPHONE ENCOUNTER
Spoke with Jw Akhtar. She called a week ago because she needed help programming pump with correct settings. She states she is noticing that the pump is recommending very small amounts of insulin with meals. She wanted to verify settings correct.  Helped her re

## 2018-12-11 ENCOUNTER — TELEPHONE (OUTPATIENT)
Dept: ENDOCRINOLOGY CLINIC | Facility: CLINIC | Age: 58
End: 2018-12-11

## 2018-12-11 NOTE — TELEPHONE ENCOUNTER
Spoke with Khadar Chaney. She received her most recent order for Omnipods but needs additional 30 day vacation supply. Asking for nurse to call in 30 day supply to [facility] mail order. Informed her I will call today.      Called pharmacy and they will process

## 2018-12-20 ENCOUNTER — HOSPITAL ENCOUNTER (OUTPATIENT)
Dept: CT IMAGING | Facility: HOSPITAL | Age: 58
Discharge: HOME | End: 2018-12-20
Attending: INTERNAL MEDICINE
Payer: MEDICARE

## 2018-12-20 ENCOUNTER — NURSE ONLY (OUTPATIENT)
Dept: HEMATOLOGY/ONCOLOGY | Facility: HOSPITAL | Age: 58
End: 2018-12-20
Attending: INTERNAL MEDICINE
Payer: MEDICARE

## 2018-12-20 DIAGNOSIS — C48.2 MALIGNANT NEOPLASM OF PERITONEUM (HCC): ICD-10-CM

## 2018-12-20 DIAGNOSIS — Z45.2 ENCOUNTER FOR ADJUSTMENT OR MANAGEMENT OF VASCULAR ACCESS DEVICE: Primary | ICD-10-CM

## 2018-12-20 LAB — CREAT BLD-MCNC: 0.7 MG/DL (ref 0.5–1.5)

## 2018-12-20 PROCEDURE — A4216 STERILE WATER/SALINE, 10 ML: HCPCS

## 2018-12-20 PROCEDURE — 82565 ASSAY OF CREATININE: CPT

## 2018-12-20 PROCEDURE — 36591 DRAW BLOOD OFF VENOUS DEVICE: CPT

## 2018-12-20 PROCEDURE — 74177 CT ABD & PELVIS W/CONTRAST: CPT | Performed by: INTERNAL MEDICINE

## 2018-12-20 PROCEDURE — 71260 CT THORAX DX C+: CPT | Performed by: INTERNAL MEDICINE

## 2018-12-20 RX ORDER — 0.9 % SODIUM CHLORIDE 0.9 %
10 VIAL (ML) INJECTION ONCE
Status: CANCELLED | OUTPATIENT
Start: 2018-12-20

## 2018-12-20 RX ORDER — 0.9 % SODIUM CHLORIDE 0.9 %
VIAL (ML) INJECTION
Status: DISCONTINUED
Start: 2018-12-20 | End: 2018-12-20

## 2018-12-20 RX ORDER — HEPARIN SODIUM (PORCINE) LOCK FLUSH IV SOLN 100 UNIT/ML 100 UNIT/ML
SOLUTION INTRAVENOUS
Status: DISCONTINUED
Start: 2018-12-20 | End: 2018-12-20

## 2018-12-20 RX ORDER — HEPARIN SODIUM (PORCINE) LOCK FLUSH IV SOLN 100 UNIT/ML 100 UNIT/ML
5 SOLUTION INTRAVENOUS ONCE
Status: COMPLETED | OUTPATIENT
Start: 2018-12-20 | End: 2018-12-20

## 2018-12-20 RX ORDER — HEPARIN SODIUM (PORCINE) LOCK FLUSH IV SOLN 100 UNIT/ML 100 UNIT/ML
SOLUTION INTRAVENOUS
Status: COMPLETED
Start: 2018-12-20 | End: 2018-12-20

## 2018-12-20 RX ORDER — HEPARIN SODIUM (PORCINE) LOCK FLUSH IV SOLN 100 UNIT/ML 100 UNIT/ML
5 SOLUTION INTRAVENOUS ONCE
Status: CANCELLED | OUTPATIENT
Start: 2018-12-20

## 2018-12-20 RX ADMIN — HEPARIN SODIUM (PORCINE) LOCK FLUSH IV SOLN 100 UNIT/ML 500 UNITS: 100 SOLUTION INTRAVENOUS at 11:30:00

## 2018-12-20 NOTE — PROGRESS NOTES
1125  Patient returned from CT to de access port.   Port flushed per protocol, de accessed, 2 x 2 and tape to site  discharged stable

## 2018-12-28 ENCOUNTER — OFFICE VISIT (OUTPATIENT)
Dept: HEMATOLOGY/ONCOLOGY | Facility: HOSPITAL | Age: 58
End: 2018-12-28
Attending: INTERNAL MEDICINE
Payer: MEDICARE

## 2018-12-28 VITALS
HEIGHT: 64 IN | BODY MASS INDEX: 35 KG/M2 | SYSTOLIC BLOOD PRESSURE: 135 MMHG | RESPIRATION RATE: 18 BRPM | TEMPERATURE: 99 F | HEART RATE: 84 BPM | WEIGHT: 205 LBS | DIASTOLIC BLOOD PRESSURE: 77 MMHG

## 2018-12-28 DIAGNOSIS — M25.562 ARTHRALGIA OF BOTH KNEES: ICD-10-CM

## 2018-12-28 DIAGNOSIS — K55.069 SUPERIOR MESENTERIC VEIN THROMBOSIS (HCC): Primary | ICD-10-CM

## 2018-12-28 DIAGNOSIS — M25.561 ARTHRALGIA OF BOTH KNEES: ICD-10-CM

## 2018-12-28 DIAGNOSIS — Z51.11 ENCOUNTER FOR CHEMOTHERAPY MANAGEMENT: ICD-10-CM

## 2018-12-28 DIAGNOSIS — C48.2 PRIMARY PERITONEAL CARCINOMATOSIS (HCC): ICD-10-CM

## 2018-12-28 PROCEDURE — 99215 OFFICE O/P EST HI 40 MIN: CPT | Performed by: INTERNAL MEDICINE

## 2018-12-28 NOTE — PROGRESS NOTES
Cancer Center Progress Note    Patient Name: Belkys Ford   YOB: 1960   Medical Record Number: [id number]   Attending Physician: Mani Chapin M.D.      Chief Complaint:  F/u primary peritoneal carcinoma    History of Present Illness:  Aysha months of anticoagulation with rivaroxaban. Thrombus was resolved on subsequent imaging      Interval history:  She is returning for routine follow-up. She is doing well overall. She denies abdominal pain. She denies any bruising or bleeding.   She den Cytoreductive surgery with peritonectomy,   En bloc small bowel resection, partial colectomy, partial cystectomy, partial resection of abdominal wall (8 cm), and omentectomy,  Small bowel resection with primary anastomosis, Mobilization of the splenic flex Other Topics      Concerns:         Service: Not Asked        Blood Transfusions: Not Asked        Caffeine Concern: No        Occupational Exposure: Not Asked        Hobby Hazards: Not Asked        Sleep Concern: Not Asked        Stress Concern: N x12    Vital Signs:  /77 (BP Location: Left arm, Patient Position: Sitting)   Pulse 84   Temp 98.6 °F (37 °C) (Oral)   Resp 18   Ht 1.626 m (5' 4\")   Wt 93 kg (205 lb 0.4 oz)   BMI 35.19 kg/m²     Physical Examination:  General: Patient is alert and Impression and Plan:  61 y/o woman metastatic primary peritoneal carcinoma, platinum resistant.   She initially presented to ER w/abdominal pain and underwent laparoscopic appendectomy by Dr Elinor Ayers (12/2013).   Peritoneal implants were noted and arthralgias likely secondary to letrozole. Monitor for now. –SMV thrombosis --started anticoagulation in March 2017. Resolved on imaging. Given she has completed 6 months of anticoagulation is okay to monitor her off anticoagulation at this time.     Garrison Essex

## 2019-01-02 NOTE — TELEPHONE ENCOUNTER
Current Outpatient Medications:  Insulin Lispro (HUMALOG) 100 UNIT/ML Subcutaneous Solution Inject via insulin pump. Maximum 200 units daily Disp: 10 mL Rfl: 0   Citalopram Hydrobromide (CELEXA) 10 MG Oral Tab Take 1 tablet (10 mg total) by mouth daily.

## 2019-01-03 RX ORDER — CITALOPRAM HYDROBROMIDE 10 MG/1
10 TABLET ORAL DAILY
Qty: 90 TABLET | Refills: 3 | Status: SHIPPED | OUTPATIENT
Start: 2019-01-03 | End: 2019-02-05

## 2019-01-03 RX ORDER — CARVEDILOL 12.5 MG/1
TABLET ORAL
Qty: 180 TABLET | Refills: 0 | Status: SHIPPED | OUTPATIENT
Start: 2019-01-03 | End: 2019-04-26

## 2019-01-03 RX ORDER — INSULIN PUMP CART,CONT INF,RF
1 CARTRIDGE (EA) SUBCUTANEOUS
Qty: 30 EACH | Refills: 1 | Status: SHIPPED | OUTPATIENT
Start: 2019-01-03 | End: 2020-03-16

## 2019-01-03 RX ORDER — INSULIN ASPART 100 [IU]/ML
INJECTION, SOLUTION INTRAVENOUS; SUBCUTANEOUS
Qty: 18 VIAL | Refills: 3 | Status: SHIPPED | OUTPATIENT
Start: 2019-01-03 | End: 2019-06-24

## 2019-01-03 RX ORDER — LETROZOLE 2.5 MG/1
2.5 TABLET, FILM COATED ORAL DAILY
Qty: 90 TABLET | Refills: 2 | Status: SHIPPED | OUTPATIENT
Start: 2019-01-03 | End: 2019-11-13

## 2019-01-31 ENCOUNTER — APPOINTMENT (OUTPATIENT)
Dept: HEMATOLOGY/ONCOLOGY | Facility: HOSPITAL | Age: 59
End: 2019-01-31
Attending: INTERNAL MEDICINE
Payer: MEDICARE

## 2019-02-05 RX ORDER — CITALOPRAM HYDROBROMIDE 10 MG/1
TABLET ORAL
Qty: 90 TABLET | Refills: 3 | Status: SHIPPED | OUTPATIENT
Start: 2019-02-05 | End: 2019-11-11

## 2019-04-12 ENCOUNTER — NURSE ONLY (OUTPATIENT)
Dept: HEMATOLOGY/ONCOLOGY | Facility: HOSPITAL | Age: 59
End: 2019-04-12
Attending: INTERNAL MEDICINE
Payer: MEDICARE

## 2019-04-12 ENCOUNTER — HOSPITAL ENCOUNTER (OUTPATIENT)
Dept: CT IMAGING | Facility: HOSPITAL | Age: 59
Discharge: HOME | End: 2019-04-12
Attending: INTERNAL MEDICINE
Payer: MEDICARE

## 2019-04-12 DIAGNOSIS — Z45.2 ENCOUNTER FOR ADJUSTMENT OR MANAGEMENT OF VASCULAR ACCESS DEVICE: Primary | ICD-10-CM

## 2019-04-12 DIAGNOSIS — C48.2 MALIGNANT NEOPLASM OF PERITONEUM (HCC): ICD-10-CM

## 2019-04-12 DIAGNOSIS — C48.2 PRIMARY PERITONEAL CARCINOMATOSIS (HCC): ICD-10-CM

## 2019-04-12 PROCEDURE — 74177 CT ABD & PELVIS W/CONTRAST: CPT | Performed by: INTERNAL MEDICINE

## 2019-04-12 PROCEDURE — 85025 COMPLETE CBC W/AUTO DIFF WBC: CPT

## 2019-04-12 PROCEDURE — 82565 ASSAY OF CREATININE: CPT

## 2019-04-12 PROCEDURE — 80053 COMPREHEN METABOLIC PANEL: CPT

## 2019-04-12 PROCEDURE — 36591 DRAW BLOOD OFF VENOUS DEVICE: CPT

## 2019-04-12 PROCEDURE — 71260 CT THORAX DX C+: CPT | Performed by: INTERNAL MEDICINE

## 2019-04-12 RX ORDER — 0.9 % SODIUM CHLORIDE 0.9 %
VIAL (ML) INJECTION
Status: DISCONTINUED
Start: 2019-04-12 | End: 2019-04-12

## 2019-04-12 RX ORDER — HEPARIN SODIUM (PORCINE) LOCK FLUSH IV SOLN 100 UNIT/ML 100 UNIT/ML
5 SOLUTION INTRAVENOUS ONCE
Status: COMPLETED | OUTPATIENT
Start: 2019-04-12 | End: 2019-04-12

## 2019-04-12 RX ORDER — HEPARIN SODIUM (PORCINE) LOCK FLUSH IV SOLN 100 UNIT/ML 100 UNIT/ML
5 SOLUTION INTRAVENOUS ONCE
Status: CANCELLED | OUTPATIENT
Start: 2019-04-12

## 2019-04-12 RX ORDER — 0.9 % SODIUM CHLORIDE 0.9 %
10 VIAL (ML) INJECTION ONCE
Status: CANCELLED | OUTPATIENT
Start: 2019-04-12

## 2019-04-12 RX ADMIN — HEPARIN SODIUM (PORCINE) LOCK FLUSH IV SOLN 100 UNIT/ML 500 UNITS: 100 SOLUTION INTRAVENOUS at 13:49:00

## 2019-04-19 ENCOUNTER — OFFICE VISIT (OUTPATIENT)
Dept: HEMATOLOGY/ONCOLOGY | Facility: HOSPITAL | Age: 59
End: 2019-04-19
Attending: INTERNAL MEDICINE
Payer: MEDICARE

## 2019-04-19 VITALS
HEIGHT: 64 IN | RESPIRATION RATE: 18 BRPM | BODY MASS INDEX: 35.04 KG/M2 | WEIGHT: 205.25 LBS | HEART RATE: 74 BPM | TEMPERATURE: 98 F | SYSTOLIC BLOOD PRESSURE: 134 MMHG | DIASTOLIC BLOOD PRESSURE: 64 MMHG

## 2019-04-19 DIAGNOSIS — K76.9 LIVER LESION: ICD-10-CM

## 2019-04-19 DIAGNOSIS — C48.2 MALIGNANT NEOPLASM OF PERITONEUM (HCC): Primary | ICD-10-CM

## 2019-04-19 DIAGNOSIS — Z51.11 ENCOUNTER FOR CHEMOTHERAPY MANAGEMENT: ICD-10-CM

## 2019-04-19 DIAGNOSIS — K55.069 SUPERIOR MESENTERIC VEIN THROMBOSIS (HCC): ICD-10-CM

## 2019-04-19 DIAGNOSIS — M25.561 ARTHRALGIA OF BOTH KNEES: ICD-10-CM

## 2019-04-19 DIAGNOSIS — M25.562 ARTHRALGIA OF BOTH KNEES: ICD-10-CM

## 2019-04-19 PROCEDURE — 99215 OFFICE O/P EST HI 40 MIN: CPT | Performed by: INTERNAL MEDICINE

## 2019-04-19 NOTE — PROGRESS NOTES
Cancer Center Progress Note    Patient Name: Ellen Mcbride   YOB: 1960   Medical Record Number: [id number]   Attending Physician: Missael Greenwood M.D.      Chief Complaint:  F/u primary peritoneal carcinoma    History of Present Illness:  Aysha months of anticoagulation with rivaroxaban. Thrombus was resolved on subsequent imaging      Interval history:  She is returning for routine follow-up. She is doing well overall. She denies abdominal pain. She denies any bruising or bleeding.   She den Cytoreductive surgery with peritonectomy,   En bloc small bowel resection, partial colectomy, partial cystectomy, partial resection of abdominal wall (8 cm), and omentectomy,  Small bowel resection with primary anastomosis, Mobilization of the splenic flex activity:        Days per week: Not on file        Minutes per session: Not on file      Stress: Not on file    Relationships      Social connections:        Talks on phone: Not on file        Gets together: Not on file        Attends Temple service: No Oral Tab, Take 1 tablet (40 mg total) by mouth nightly., Disp: 90 tablet, Rfl: 1  •  Loperamide HCl (IMODIUM A-D) 2 MG Oral Tab, Take 2 mg by mouth as needed for Diarrhea., Disp: , Rfl:   •  Glucose Blood (FREESTYLE TEST) In Vitro Strip, Test blood glucose lobe (series 5, image 42) appears new or more conspicuous as compared with serial prior examinations. This is considered indeterminate.   Suggest either short-term follow-up imaging or liver   protocol MRI of the abdomen without and with contrast for furth positive for estrogen receptor  --Treated by Dr. Zhanna Skinner and is s/p 6 cycles of carbo/taxol (completed 8/2014) per recommendation of [facility]. --A CT on 1/15/15 revealed new omental/mesenteric tumors:  In March 2015 she underwent Cytoreductive surgery, peritonecto

## 2019-04-26 RX ORDER — CARVEDILOL 12.5 MG/1
TABLET ORAL
Qty: 60 TABLET | Refills: 0 | Status: SHIPPED | OUTPATIENT
Start: 2019-04-26 | End: 2019-05-08

## 2019-04-26 NOTE — TELEPHONE ENCOUNTER
LOV 8/29/18. RTC 3 months . No f/u scheduled. Called pt to make f/u appt. Pt said she would call back. Pended 1 month supply for provider.

## 2019-05-02 ENCOUNTER — PATIENT MESSAGE (OUTPATIENT)
Dept: ENDOCRINOLOGY CLINIC | Facility: CLINIC | Age: 59
End: 2019-05-02

## 2019-05-07 ENCOUNTER — TELEPHONE (OUTPATIENT)
Dept: ENDOCRINOLOGY CLINIC | Facility: CLINIC | Age: 59
End: 2019-05-07

## 2019-05-07 NOTE — TELEPHONE ENCOUNTER
Sent MC message to patient asking her if she's Ok with us sending script to another pharmacy (will have to check if in stock).

## 2019-05-07 NOTE — TELEPHONE ENCOUNTER
Current Outpatient Medications:  carvedilol 12.5 MG Oral Tab TAKE 1 TABLET BY MOUTH TWICE A DAY WITH FOOD Disp: 60 tablet Rfl: 0     Med on back order/unavaible pls send new Alt  RX insurance requires 90

## 2019-05-08 ENCOUNTER — TELEPHONE (OUTPATIENT)
Dept: ENDOCRINOLOGY CLINIC | Facility: CLINIC | Age: 59
End: 2019-05-08

## 2019-05-08 RX ORDER — CARVEDILOL 12.5 MG/1
TABLET ORAL
Qty: 180 TABLET | Refills: 0 | Status: SHIPPED | OUTPATIENT
Start: 2019-05-08 | End: 2019-08-02

## 2019-05-08 NOTE — TELEPHONE ENCOUNTER
Pt calling to provide new fax:#[phone]/Attn:Coverage Determination Dept, indicates Dr Trina Wahl has listed as urgent for prior auth for pods, 90 day script rx:Omnipod.  Pt indicates she spk w Med Cov Determination at 10:50 this morning after speaking rose Mitchell

## 2019-05-08 NOTE — TELEPHONE ENCOUNTER
Contact letter mailed to patient to schedule recall colon.     PT called to ask for exact phone number and fax of where form was sent by [employer]. RN advised it was faxed urgently but could not verify exact number. Pt asking RN to call and verify order was rec'd and for what cost. RN advised pt to call as well.

## 2019-05-09 NOTE — TELEPHONE ENCOUNTER
Faxed as requested. Also submitted PA - which has been approved on [employer]. PA approved from 2/8/19- 5/8/20. Pt made aware and states she will order pod refills to [employer] same as before and call us if she has any issues.  Also contacted Rep, T

## 2019-05-10 RX ORDER — INSULIN PUMP CART,CONT INF,RF
1 CARTRIDGE (EA) SUBCUTANEOUS
Qty: 6 EACH | Refills: 3 | Status: SHIPPED | OUTPATIENT
Start: 2019-05-10 | End: 2019-07-26

## 2019-05-17 NOTE — TELEPHONE ENCOUNTER
Box of Omnipods at [address] [place] [address] reception desk A. Patient to  today 5/17/19.

## 2019-06-03 ENCOUNTER — HOSPITAL ENCOUNTER (OUTPATIENT)
Dept: MRI IMAGING | Facility: HOSPITAL | Age: 59
Discharge: HOME | End: 2019-06-03
Attending: INTERNAL MEDICINE
Payer: MEDICARE

## 2019-06-03 ENCOUNTER — TELEPHONE (OUTPATIENT)
Dept: HEMATOLOGY/ONCOLOGY | Facility: HOSPITAL | Age: 59
End: 2019-06-03

## 2019-06-03 DIAGNOSIS — C48.2 MALIGNANT NEOPLASM OF PERITONEUM (HCC): ICD-10-CM

## 2019-06-03 DIAGNOSIS — K76.9 LIVER LESION: ICD-10-CM

## 2019-06-03 PROCEDURE — 74183 MRI ABD W/O CNTR FLWD CNTR: CPT | Performed by: INTERNAL MEDICINE

## 2019-06-03 PROCEDURE — 82565 ASSAY OF CREATININE: CPT

## 2019-06-03 PROCEDURE — A9575 INJ GADOTERATE MEGLUMI 0.1ML: HCPCS | Performed by: INTERNAL MEDICINE

## 2019-06-17 ENCOUNTER — OFFICE VISIT (OUTPATIENT)
Dept: HEMATOLOGY/ONCOLOGY | Facility: HOSPITAL | Age: 59
End: 2019-06-17
Attending: INTERNAL MEDICINE
Payer: MEDICARE

## 2019-06-17 VITALS
HEART RATE: 83 BPM | TEMPERATURE: 99 F | RESPIRATION RATE: 18 BRPM | OXYGEN SATURATION: 96 % | WEIGHT: 206.13 LBS | HEIGHT: 64 IN | SYSTOLIC BLOOD PRESSURE: 128 MMHG | DIASTOLIC BLOOD PRESSURE: 67 MMHG | BODY MASS INDEX: 35.19 KG/M2

## 2019-06-17 DIAGNOSIS — E83.42 HYPOMAGNESEMIA: ICD-10-CM

## 2019-06-17 DIAGNOSIS — M25.562 ARTHRALGIA OF BOTH KNEES: ICD-10-CM

## 2019-06-17 DIAGNOSIS — M25.561 ARTHRALGIA OF BOTH KNEES: ICD-10-CM

## 2019-06-17 DIAGNOSIS — Z51.11 ENCOUNTER FOR CHEMOTHERAPY MANAGEMENT: ICD-10-CM

## 2019-06-17 DIAGNOSIS — C48.2 PRIMARY PERITONEAL CARCINOMATOSIS (HCC): ICD-10-CM

## 2019-06-17 DIAGNOSIS — K55.069 SUPERIOR MESENTERIC VEIN THROMBOSIS (HCC): Primary | ICD-10-CM

## 2019-06-17 PROCEDURE — 99215 OFFICE O/P EST HI 40 MIN: CPT | Performed by: INTERNAL MEDICINE

## 2019-06-17 NOTE — PROGRESS NOTES
Cancer Center Progress Note    Patient Name: Navneet Gonzalez   YOB: 1960   Medical Record Number: [id number]   Attending Physician: Rey Michelle M.D.      Chief Complaint:  F/u primary peritoneal carcinoma    History of Present Illness:  Aysha months of anticoagulation with rivaroxaban. Thrombus was resolved on subsequent imaging    Interval history:  She is returning for routine follow-up. She is doing well overall. She denies abdominal pain. She denies any bruising or bleeding.   She denie surgery with peritonectomy,   En bloc small bowel resection, partial colectomy, partial cystectomy, partial resection of abdominal wall (8 cm), and omentectomy,  Small bowel resection with primary anastomosis, Mobilization of the splenic flexure, Low anter Days per week: Not on file        Minutes per session: Not on file      Stress: Not on file    Relationships      Social connections:        Talks on phone: Not on file        Gets together: Not on file        Attends Denominational service: Not on file Subcutaneous Solution, Inject via insulin pump.  Maximum 200 units daily, Disp: 10 mL, Rfl: 0  •  atorvastatin 40 MG Oral Tab, Take 1 tablet (40 mg total) by mouth nightly., Disp: 90 tablet, Rfl: 1  •  Loperamide HCl (IMODIUM A-D) 2 MG Oral Tab, Take 2 mg b AST, ALT, BILT, TP in the last 168 hours. Radiology:  CT CAP 4/19  1. Subtle subcentimeter hypodense lesion in the right hepatic lobe (series 5, image 42) appears new or more conspicuous as compared with serial prior examinations.   This is considered invasive implants with psammoma bodies\".     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 Geoff Vizcaino and is s/p 6 cycles of carbo/taxol (completed 8/2014) per recommendatio and resources were discussed. Appropriate resources were reviewed and discussed with the pateint and family.      Jerry Mireles MD

## 2019-06-24 ENCOUNTER — PATIENT MESSAGE (OUTPATIENT)
Dept: ENDOCRINOLOGY CLINIC | Facility: CLINIC | Age: 59
End: 2019-06-24

## 2019-06-24 RX ORDER — INSULIN ASPART 100 [IU]/ML
INJECTION, SOLUTION INTRAVENOUS; SUBCUTANEOUS
Qty: 18 VIAL | Refills: 3 | Status: SHIPPED | OUTPATIENT
Start: 2019-06-24 | End: 2020-09-10

## 2019-06-24 RX ORDER — INSULIN LISPRO 100 [IU]/ML
INJECTION, SOLUTION INTRAVENOUS; SUBCUTANEOUS
Qty: 180 ML | Refills: 1 | Status: SHIPPED | OUTPATIENT
Start: 2019-06-24 | End: 2020-01-07

## 2019-06-24 NOTE — TELEPHONE ENCOUNTER
From: Amira Rebollar  To: Chan Mei MD  Sent: 6/24/2019 1:41 PM CDT  Subject: Prescription Question    Hello,   it's time to order my 90-day insulin, either Humalog or Novolog.    I would like to price compare at [facility], Alysa and Isela Pittman for the [employer]

## 2019-06-30 ENCOUNTER — SNF/IP PROF CHARGE ONLY (OUTPATIENT)
Dept: HEMATOLOGY/ONCOLOGY | Facility: HOSPITAL | Age: 59
End: 2019-06-30

## 2019-06-30 DIAGNOSIS — C48.2 MALIGNANT NEOPLASM OF PERITONEUM (HCC): ICD-10-CM

## 2019-06-30 PROCEDURE — G9678 ONCOLOGY CARE MODEL SERVICE: HCPCS | Performed by: INTERNAL MEDICINE

## 2019-07-05 ENCOUNTER — TELEPHONE (OUTPATIENT)
Dept: ENDOCRINOLOGY CLINIC | Facility: CLINIC | Age: 59
End: 2019-07-05

## 2019-07-05 NOTE — TELEPHONE ENCOUNTER
Spoke w/ Alexandria. She is price shopping various pharmacies. She is aware Novolog preferred. She has a printed script that she will provide to pharmacy of her chosing.

## 2019-07-05 NOTE — TELEPHONE ENCOUNTER
Current Outpatient Medications:  Insulin Lispro (HUMALOG) 100 UNIT/ML Subcutaneous Solution 200 units via insulin pump Disp: 180 mL Rfl: 1     Covermymeds PA has been started to to key. covermymeds. [employer] enter key [id number], pts last name,

## 2019-07-17 ENCOUNTER — OFFICE VISIT (OUTPATIENT)
Dept: ENDOCRINOLOGY CLINIC | Facility: CLINIC | Age: 59
End: 2019-07-17
Payer: MEDICARE

## 2019-07-17 VITALS
WEIGHT: 205 LBS | BODY MASS INDEX: 35 KG/M2 | SYSTOLIC BLOOD PRESSURE: 131 MMHG | DIASTOLIC BLOOD PRESSURE: 80 MMHG | HEART RATE: 89 BPM

## 2019-07-17 DIAGNOSIS — Z79.4 UNCONTROLLED TYPE 2 DIABETES MELLITUS WITH COMPLICATION, WITH LONG-TERM CURRENT USE OF INSULIN (HCC): Primary | ICD-10-CM

## 2019-07-17 DIAGNOSIS — E11.8 UNCONTROLLED TYPE 2 DIABETES MELLITUS WITH COMPLICATION, WITH LONG-TERM CURRENT USE OF INSULIN (HCC): Primary | ICD-10-CM

## 2019-07-17 DIAGNOSIS — E11.65 UNCONTROLLED TYPE 2 DIABETES MELLITUS WITH COMPLICATION, WITH LONG-TERM CURRENT USE OF INSULIN (HCC): Primary | ICD-10-CM

## 2019-07-17 LAB
CARTRIDGE LOT#: ABNORMAL NUMERIC
GLUCOSE BLOOD: 258
HEMOGLOBIN A1C: 8.8 % (ref 4.3–5.6)
TEST STRIP LOT #: NORMAL NUMERIC

## 2019-07-17 PROCEDURE — 36416 COLLJ CAPILLARY BLOOD SPEC: CPT | Performed by: INTERNAL MEDICINE

## 2019-07-17 PROCEDURE — 99214 OFFICE O/P EST MOD 30 MIN: CPT | Performed by: INTERNAL MEDICINE

## 2019-07-17 PROCEDURE — 83036 HEMOGLOBIN GLYCOSYLATED A1C: CPT | Performed by: INTERNAL MEDICINE

## 2019-07-17 PROCEDURE — G0463 HOSPITAL OUTPT CLINIC VISIT: HCPCS | Performed by: INTERNAL MEDICINE

## 2019-07-17 PROCEDURE — 82962 GLUCOSE BLOOD TEST: CPT | Performed by: INTERNAL MEDICINE

## 2019-07-17 RX ORDER — ATORVASTATIN CALCIUM 40 MG/1
40 TABLET, FILM COATED ORAL NIGHTLY
Qty: 90 TABLET | Refills: 1 | Status: SHIPPED | OUTPATIENT
Start: 2019-07-17 | End: 2020-01-06

## 2019-07-17 NOTE — PROGRESS NOTES
Name: Giuliana Merrill  Date: 7/17/2019    Referring Physician: No ref. provider found    HISTORY OF PRESENT ILLNESS   Giuliana Merrill is a 60year old female who presents for diabetes mellitus.   She is now maintained on letrozole for ovarian metastatic CA, Misc, 1 each by Does not apply route every other day., Disp: 30 each, Rfl: 1  •  atorvastatin 40 MG Oral Tab, Take 1 tablet (40 mg total) by mouth nightly., Disp: 90 tablet, Rfl: 1  •  Loperamide HCl (IMODIUM A-D) 2 MG Oral Tab, Take 2 mg by mouth as neede medical management   • Injury of right foot 2001    Soft tissue injury, right foot   • Neuropathy     Bilateral feet   • Other and unspecified hyperlipidemia    • Ovarian cancer ([facility] [address]     peritoneal and bowel implants   • Ovarian cancer ([facility] [address]    • Pancreat no acute distress  Eyes:  normal conjunctivae, sclera. , normal sclera and normal pupils  Ears/Nose/Mouth/Throat/Neck:  no palpable thyroid nodules or cervical lymphadenopathy  Back: no kyphosis or back tenderness  Respiratory:  clear to auscultation bilate

## 2019-07-18 ENCOUNTER — PATIENT MESSAGE (OUTPATIENT)
Dept: ENDOCRINOLOGY CLINIC | Facility: CLINIC | Age: 59
End: 2019-07-18

## 2019-07-18 DIAGNOSIS — Z12.31 ENCOUNTER FOR SCREENING MAMMOGRAM FOR MALIGNANT NEOPLASM OF BREAST: Primary | ICD-10-CM

## 2019-07-18 NOTE — TELEPHONE ENCOUNTER
From: Ankit Bartlett  To: Aretha Hewitt MD  Sent: 7/18/2019 3:19 PM CDT  Subject: Visit [place] Si Dr Mendel Downy,    Thank you for ordering my Bilateral Mammo.   The Medicare guideline code is   # Z 12.31  Encounter for Screening Mammogram  for

## 2019-07-24 ENCOUNTER — PATIENT MESSAGE (OUTPATIENT)
Dept: ENDOCRINOLOGY CLINIC | Facility: CLINIC | Age: 59
End: 2019-07-24

## 2019-07-25 ENCOUNTER — PATIENT MESSAGE (OUTPATIENT)
Dept: ENDOCRINOLOGY CLINIC | Facility: CLINIC | Age: 59
End: 2019-07-25

## 2019-07-25 ENCOUNTER — TELEPHONE (OUTPATIENT)
Dept: ENDOCRINOLOGY CLINIC | Facility: CLINIC | Age: 59
End: 2019-07-25

## 2019-07-25 RX ORDER — BLOOD SUGAR DIAGNOSTIC
STRIP MISCELLANEOUS
Qty: 500 STRIP | Refills: 5 | Status: SHIPPED | OUTPATIENT
Start: 2019-07-25 | End: 2019-07-25

## 2019-07-25 RX ORDER — BLOOD SUGAR DIAGNOSTIC
STRIP MISCELLANEOUS
Qty: 300 STRIP | Refills: 5 | Status: SHIPPED | OUTPATIENT
Start: 2019-07-25 | End: 2020-11-25

## 2019-07-25 RX ORDER — BLOOD SUGAR DIAGNOSTIC
STRIP MISCELLANEOUS
Qty: 300 STRIP | Refills: 5 | Status: SHIPPED | OUTPATIENT
Start: 2019-07-25 | End: 2019-07-25

## 2019-07-25 NOTE — TELEPHONE ENCOUNTER
From: Giuliana Merrill  To: Td Gustafson MD  Sent: 7/24/2019 1:04 PM CDT  Subject: Prescription Question    Possible to phone in the compatible glucose strips for OMNIPOD (Freestyle?  Or Freestyle Light?? Not sure) to both [facility]) AND t

## 2019-07-25 NOTE — TELEPHONE ENCOUNTER
Current Outpatient Medications:  Glucose Blood (FREESTYLE TEST) In Vitro Strip Use to test BG 5 times daily.  Disp: 500 strip Rfl: 5     Per pharmacy medicare guidelines only cover max of testing 3 times a day include ICD10 DX code for coverage

## 2019-07-25 NOTE — TELEPHONE ENCOUNTER
Resent w/ updated 3 times testing and DX code to both pharmacies.  Made patient aware via [facility].

## 2019-07-26 RX ORDER — INSULIN PUMP CART,CONT INF,RF
1 CARTRIDGE (EA) SUBCUTANEOUS
Qty: 6 EACH | Refills: 3 | Status: SHIPPED | OUTPATIENT
Start: 2019-07-26 | End: 2020-03-16 | Stop reason: DRUGHIGH

## 2019-07-26 NOTE — TELEPHONE ENCOUNTER
From: Amrit Castaneda  To: Eva Leo MD  Sent: 7/25/2019 6:20 PM CDT  Subject: Prescription Lucas Hereford Dr Chris De La Torre my Omnipod script is NOT for the full 90-day refill.  (?)    [facility] have been [facility] out

## 2019-07-31 ENCOUNTER — SNF/IP PROF CHARGE ONLY (OUTPATIENT)
Dept: HEMATOLOGY/ONCOLOGY | Facility: HOSPITAL | Age: 59
End: 2019-07-31

## 2019-07-31 DIAGNOSIS — C48.2 MALIGNANT NEOPLASM OF PERITONEUM (HCC): ICD-10-CM

## 2019-07-31 PROCEDURE — G9678 ONCOLOGY CARE MODEL SERVICE: HCPCS | Performed by: INTERNAL MEDICINE

## 2019-08-02 RX ORDER — CARVEDILOL 12.5 MG/1
TABLET ORAL
Qty: 180 TABLET | Refills: 0 | Status: SHIPPED | OUTPATIENT
Start: 2019-08-02 | End: 2019-10-30

## 2019-08-31 ENCOUNTER — SNF/IP PROF CHARGE ONLY (OUTPATIENT)
Dept: HEMATOLOGY/ONCOLOGY | Facility: HOSPITAL | Age: 59
End: 2019-08-31

## 2019-08-31 DIAGNOSIS — C48.2 MALIGNANT NEOPLASM OF PERITONEUM (HCC): ICD-10-CM

## 2019-08-31 PROCEDURE — G9678 ONCOLOGY CARE MODEL SERVICE: HCPCS | Performed by: INTERNAL MEDICINE

## 2019-09-16 RX ORDER — HEPARIN SODIUM (PORCINE) LOCK FLUSH IV SOLN 100 UNIT/ML 100 UNIT/ML
5 SOLUTION INTRAVENOUS ONCE
Status: CANCELLED | OUTPATIENT
Start: 2019-09-16

## 2019-09-16 RX ORDER — 0.9 % SODIUM CHLORIDE 0.9 %
10 VIAL (ML) INJECTION ONCE
Status: CANCELLED | OUTPATIENT
Start: 2019-09-16

## 2019-09-17 ENCOUNTER — OFFICE VISIT (OUTPATIENT)
Dept: HEMATOLOGY/ONCOLOGY | Facility: HOSPITAL | Age: 59
End: 2019-09-17
Attending: INTERNAL MEDICINE
Payer: MEDICARE

## 2019-09-17 ENCOUNTER — TELEPHONE (OUTPATIENT)
Dept: HEMATOLOGY/ONCOLOGY | Facility: HOSPITAL | Age: 59
End: 2019-09-17

## 2019-09-17 VITALS
HEART RATE: 74 BPM | HEIGHT: 64 IN | OXYGEN SATURATION: 96 % | SYSTOLIC BLOOD PRESSURE: 151 MMHG | TEMPERATURE: 98 F | WEIGHT: 206.38 LBS | DIASTOLIC BLOOD PRESSURE: 75 MMHG | BODY MASS INDEX: 35.23 KG/M2 | RESPIRATION RATE: 18 BRPM

## 2019-09-17 DIAGNOSIS — C48.2 MALIGNANT NEOPLASM OF PERITONEUM (HCC): ICD-10-CM

## 2019-09-17 DIAGNOSIS — Z79.4 UNCONTROLLED TYPE 2 DIABETES MELLITUS WITH COMPLICATION, WITH LONG-TERM CURRENT USE OF INSULIN (HCC): ICD-10-CM

## 2019-09-17 DIAGNOSIS — E11.65 UNCONTROLLED TYPE 2 DIABETES MELLITUS WITH COMPLICATION, WITH LONG-TERM CURRENT USE OF INSULIN (HCC): ICD-10-CM

## 2019-09-17 DIAGNOSIS — E83.42 HYPOMAGNESEMIA: ICD-10-CM

## 2019-09-17 DIAGNOSIS — C48.2 PRIMARY PERITONEAL CARCINOMATOSIS (HCC): ICD-10-CM

## 2019-09-17 DIAGNOSIS — Z45.2 ENCOUNTER FOR ADJUSTMENT OR MANAGEMENT OF VASCULAR ACCESS DEVICE: Primary | ICD-10-CM

## 2019-09-17 DIAGNOSIS — K76.9 LIVER LESION: ICD-10-CM

## 2019-09-17 DIAGNOSIS — E11.8 UNCONTROLLED TYPE 2 DIABETES MELLITUS WITH COMPLICATION, WITH LONG-TERM CURRENT USE OF INSULIN (HCC): ICD-10-CM

## 2019-09-17 DIAGNOSIS — K55.069 SUPERIOR MESENTERIC VEIN THROMBOSIS (HCC): Primary | ICD-10-CM

## 2019-09-17 DIAGNOSIS — Z51.11 ENCOUNTER FOR CHEMOTHERAPY MANAGEMENT: ICD-10-CM

## 2019-09-17 LAB
ALBUMIN SERPL-MCNC: 3.7 G/DL (ref 3.4–5)
ALBUMIN/GLOB SERPL: 1.1 {RATIO} (ref 1–2)
ALP LIVER SERPL-CCNC: 71 U/L (ref 46–118)
ALT SERPL-CCNC: 73 U/L (ref 13–56)
ANION GAP SERPL CALC-SCNC: 9 MMOL/L (ref 0–18)
AST SERPL-CCNC: 48 U/L (ref 15–37)
BASOPHILS # BLD AUTO: 0.03 X10(3) UL (ref 0–0.2)
BASOPHILS NFR BLD AUTO: 0.6 %
BILIRUB SERPL-MCNC: 1.9 MG/DL (ref 0.1–2)
BUN BLD-MCNC: 12 MG/DL (ref 7–18)
BUN/CREAT SERPL: 13.3 (ref 10–20)
CALCIUM BLD-MCNC: 8.3 MG/DL (ref 8.5–10.1)
CHLORIDE SERPL-SCNC: 101 MMOL/L (ref 98–112)
CHOLEST SMN-MCNC: 97 MG/DL (ref ?–200)
CO2 SERPL-SCNC: 29 MMOL/L (ref 21–32)
CREAT BLD-MCNC: 0.9 MG/DL (ref 0.55–1.02)
CREAT UR-SCNC: 129 MG/DL
DEPRECATED RDW RBC AUTO: 40.7 FL (ref 35.1–46.3)
EOSINOPHIL # BLD AUTO: 0.09 X10(3) UL (ref 0–0.7)
EOSINOPHIL NFR BLD AUTO: 1.9 %
ERYTHROCYTE [DISTWIDTH] IN BLOOD BY AUTOMATED COUNT: 12.5 % (ref 11–15)
GLOBULIN PLAS-MCNC: 3.3 G/DL (ref 2.8–4.4)
GLUCOSE BLD-MCNC: 260 MG/DL (ref 70–99)
HAV IGM SER QL: 1 MG/DL (ref 1.6–2.6)
HCT VFR BLD AUTO: 38.3 % (ref 35–48)
HDLC SERPL-MCNC: 51 MG/DL (ref 40–59)
HGB BLD-MCNC: 13.3 G/DL (ref 12–16)
IMM GRANULOCYTES # BLD AUTO: 0 X10(3) UL (ref 0–1)
IMM GRANULOCYTES NFR BLD: 0 %
LDLC SERPL CALC-MCNC: 25 MG/DL (ref ?–100)
LYMPHOCYTES # BLD AUTO: 1.26 X10(3) UL (ref 1–4)
LYMPHOCYTES NFR BLD AUTO: 26.8 %
M PROTEIN MFR SERPL ELPH: 7 G/DL (ref 6.4–8.2)
MCH RBC QN AUTO: 30.9 PG (ref 26–34)
MCHC RBC AUTO-ENTMCNC: 34.7 G/DL (ref 31–37)
MCV RBC AUTO: 89.1 FL (ref 80–100)
MICROALBUMIN UR-MCNC: 1.52 MG/DL
MICROALBUMIN/CREAT 24H UR-RTO: 11.8 UG/MG (ref ?–30)
MONOCYTES # BLD AUTO: 0.33 X10(3) UL (ref 0.1–1)
MONOCYTES NFR BLD AUTO: 7 %
NEUTROPHILS # BLD AUTO: 2.99 X10 (3) UL (ref 1.5–7.7)
NEUTROPHILS # BLD AUTO: 2.99 X10(3) UL (ref 1.5–7.7)
NEUTROPHILS NFR BLD AUTO: 63.7 %
NONHDLC SERPL-MCNC: 46 MG/DL (ref ?–130)
OSMOLALITY SERPL CALC.SUM OF ELEC: 297 MOSM/KG (ref 275–295)
PATIENT FASTING: NO
PATIENT FASTING: NO
PLATELET # BLD AUTO: 151 10(3)UL (ref 150–450)
POTASSIUM SERPL-SCNC: 3.9 MMOL/L (ref 3.5–5.1)
RBC # BLD AUTO: 4.3 X10(6)UL (ref 3.8–5.3)
SODIUM SERPL-SCNC: 139 MMOL/L (ref 136–145)
T4 FREE SERPL-MCNC: 0.9 NG/DL (ref 0.8–1.7)
TRIGL SERPL-MCNC: 105 MG/DL (ref 30–149)
TSI SER-ACNC: 1.67 MIU/ML (ref 0.36–3.74)
VLDLC SERPL CALC-MCNC: 21 MG/DL (ref 0–30)
WBC # BLD AUTO: 4.7 X10(3) UL (ref 4–11)

## 2019-09-17 PROCEDURE — 82570 ASSAY OF URINE CREATININE: CPT

## 2019-09-17 PROCEDURE — 83735 ASSAY OF MAGNESIUM: CPT

## 2019-09-17 PROCEDURE — 36591 DRAW BLOOD OFF VENOUS DEVICE: CPT

## 2019-09-17 PROCEDURE — 84443 ASSAY THYROID STIM HORMONE: CPT

## 2019-09-17 PROCEDURE — 80053 COMPREHEN METABOLIC PANEL: CPT

## 2019-09-17 PROCEDURE — 84439 ASSAY OF FREE THYROXINE: CPT

## 2019-09-17 PROCEDURE — 85025 COMPLETE CBC W/AUTO DIFF WBC: CPT

## 2019-09-17 PROCEDURE — 82043 UR ALBUMIN QUANTITATIVE: CPT

## 2019-09-17 PROCEDURE — 99215 OFFICE O/P EST HI 40 MIN: CPT | Performed by: INTERNAL MEDICINE

## 2019-09-17 PROCEDURE — 80061 LIPID PANEL: CPT

## 2019-09-17 RX ORDER — HEPARIN SODIUM (PORCINE) LOCK FLUSH IV SOLN 100 UNIT/ML 100 UNIT/ML
5 SOLUTION INTRAVENOUS ONCE
Status: CANCELLED | OUTPATIENT
Start: 2019-09-17

## 2019-09-17 RX ORDER — HEPARIN SODIUM (PORCINE) LOCK FLUSH IV SOLN 100 UNIT/ML 100 UNIT/ML
5 SOLUTION INTRAVENOUS ONCE
Status: COMPLETED | OUTPATIENT
Start: 2019-09-17 | End: 2019-09-17

## 2019-09-17 RX ORDER — 0.9 % SODIUM CHLORIDE 0.9 %
VIAL (ML) INJECTION
Status: DISCONTINUED
Start: 2019-09-17 | End: 2019-09-17

## 2019-09-17 RX ORDER — 0.9 % SODIUM CHLORIDE 0.9 %
10 VIAL (ML) INJECTION ONCE
Status: CANCELLED | OUTPATIENT
Start: 2019-09-17

## 2019-09-17 RX ADMIN — HEPARIN SODIUM (PORCINE) LOCK FLUSH IV SOLN 100 UNIT/ML 500 UNITS: 100 SOLUTION INTRAVENOUS at 12:12:00

## 2019-09-17 NOTE — PROGRESS NOTES
Cancer Center Progress Note    Patient Name: Valencia Virk   YOB: 1960   Medical Record Number: [id number]   Attending Physician: Nidhi Whelan M.D.      Chief Complaint:  F/u primary peritoneal carcinoma    History of Present Illness:  Cance months of anticoagulation with rivaroxaban. Thrombus was resolved on subsequent imaging    Interval history:  She is returning for routine follow-up. She is doing well overall. She denies abdominal pain. She denies any bruising or bleeding.   She denie surgery with peritonectomy,   En bloc small bowel resection, partial colectomy, partial cystectomy, partial resection of abdominal wall (8 cm), and omentectomy,  Small bowel resection with primary anastomosis, Mobilization of the splenic flexure, Low anter Days per week: Not on file        Minutes per session: Not on file      Stress: Not on file    Relationships      Social connections:        Talks on phone: Not on file        Gets together: Not on file        Attends Jewish service: Not on file TAKE 1 TABLET DAILY, Disp: 90 tablet, Rfl: 3  •  letrozole 2.5 MG Oral Tab, Take 1 tablet (2.5 mg total) by mouth daily. , Disp: 90 tablet, Rfl: 2  •  Insulin Disposable Pump (OMNIPOD 5 PACK) Does not apply Misc, 1 each by Does not apply route every other d MCHC 32.5 11/01/2016   RDW 18.2* 11/01/2016   NEPRELIM 3.61 07/25/2015   WBC 6.4 11/01/2016    11/01/2016     CMP:   No results for input(s): GLU, BUN, CREATSERUM, GFRAA, GFRNAA, CA, ALB, NA, K, CL, CO2, ALKPHO, AST, ALT, BILT, TP in the last 168 resistant.   She initially presented to ER w/abdominal pain and underwent laparoscopic appendectomy by Dr Alexandria Calabrese (12/2013).   Peritoneal implants were noted and final pathology revealed \"findings are most suggestive of ovarian invasive implants with psamm anticoagulation is okay to monitor her off anticoagulation at this time. –History of hypomag. Recheck at follow-up this is likely secondary to her chronic diarrhea    The patient's emotional well being was assessed and resources were discussed.   Iris Hansen

## 2019-09-18 ENCOUNTER — TELEPHONE (OUTPATIENT)
Dept: PULMONOLOGY | Facility: CLINIC | Age: 59
End: 2019-09-18

## 2019-09-18 NOTE — TELEPHONE ENCOUNTER
Pt states that Dr. Tori Colon spoke to Dr. Molly Matos about seeing pt sooner than first available due to sleep issues. Please call pt with appointment.

## 2019-09-30 ENCOUNTER — SNF/IP PROF CHARGE ONLY (OUTPATIENT)
Dept: HEMATOLOGY/ONCOLOGY | Facility: HOSPITAL | Age: 59
End: 2019-09-30

## 2019-09-30 DIAGNOSIS — C48.2 MALIGNANT NEOPLASM OF PERITONEUM (HCC): ICD-10-CM

## 2019-09-30 PROCEDURE — G9678 ONCOLOGY CARE MODEL SERVICE: HCPCS | Performed by: INTERNAL MEDICINE

## 2019-10-10 ENCOUNTER — OFFICE VISIT (OUTPATIENT)
Dept: PULMONOLOGY | Facility: CLINIC | Age: 59
End: 2019-10-10
Payer: MEDICARE

## 2019-10-10 VITALS
BODY MASS INDEX: 35.64 KG/M2 | WEIGHT: 206.19 LBS | SYSTOLIC BLOOD PRESSURE: 110 MMHG | HEIGHT: 63.75 IN | HEART RATE: 87 BPM | OXYGEN SATURATION: 96 % | DIASTOLIC BLOOD PRESSURE: 87 MMHG

## 2019-10-10 DIAGNOSIS — G47.33 OSA (OBSTRUCTIVE SLEEP APNEA): Primary | ICD-10-CM

## 2019-10-10 PROCEDURE — 99203 OFFICE O/P NEW LOW 30 MIN: CPT | Performed by: INTERNAL MEDICINE

## 2019-10-10 PROCEDURE — G0463 HOSPITAL OUTPT CLINIC VISIT: HCPCS | Performed by: INTERNAL MEDICINE

## 2019-10-10 NOTE — PROGRESS NOTES
Dear Rolando Mendoza:           As you know, Emiliano Rodas is a 58-year-old female who I am now evaluating for possible sleep disordered breathing.        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 HEENT examination is unremarkable with pupils equal round and reactive to light and

## 2019-10-15 ENCOUNTER — OFFICE VISIT (OUTPATIENT)
Dept: SLEEP CENTER | Age: 59
End: 2019-10-15
Attending: INTERNAL MEDICINE
Payer: MEDICARE

## 2019-10-15 DIAGNOSIS — G47.33 OSA (OBSTRUCTIVE SLEEP APNEA): ICD-10-CM

## 2019-10-15 PROCEDURE — 95810 POLYSOM 6/> YRS 4/> PARAM: CPT

## 2019-10-18 NOTE — PROCEDURES
[facility]  Accredited by the [facility] ([facility])    PATIENT'S NAME: Wes Dawson PHYSICIAN: Lexi Negron MD   REFERRING PHYSICIAN: Lexi Negron MD   PATIENT ACCOUNT #: [id number] LOCATION:  The respiratory event-related arousal index is 7 events per hour, and the spontaneous arousal index is 1.4 events per hour, for a combined arousal index of 23.3 events per hour.   There were 844 periodic limb movements for a periodic limb movement index of

## 2019-10-21 ENCOUNTER — TELEPHONE (OUTPATIENT)
Dept: HEMATOLOGY/ONCOLOGY | Facility: HOSPITAL | Age: 59
End: 2019-10-21

## 2019-10-21 NOTE — TELEPHONE ENCOUNTER
Yifan Kebede has called requesting follow up on authorization for her CT. Per Armand Duron \"CT is not due to be scheduled until December 2019. \" This message was given to Yifan Kebede who states she \" just wants to schedule it. I won't have it until December. \"

## 2019-10-28 ENCOUNTER — TELEPHONE (OUTPATIENT)
Dept: PULMONOLOGY | Facility: CLINIC | Age: 59
End: 2019-10-28

## 2019-10-28 DIAGNOSIS — G47.33 OSA (OBSTRUCTIVE SLEEP APNEA): Primary | ICD-10-CM

## 2019-10-28 NOTE — TELEPHONE ENCOUNTER
RN, please call the patient and facilitate [facility] referral for obstructive sleep apnea and give the patient information to call and send her information to them.

## 2019-10-28 NOTE — TELEPHONE ENCOUNTER
[facility] form filled and signed by Dr. Won Li. External referral generated. [facility] form, referral, sleep study, facesheet and insurance information faxed to [phone]. Confirmation sheet received. Form sent to scanning.     Contacted pt

## 2019-10-28 NOTE — TELEPHONE ENCOUNTER
Spoke to pt, states had sleep study done on 10/15/19 and was informed would be contacted by office to discuss dental device that she would like to use rather than machine . No orders noted. Dr. Porsha Luu, please advise on orders for orthodontist. Thank you.

## 2019-10-30 RX ORDER — CARVEDILOL 12.5 MG/1
TABLET ORAL
Qty: 180 TABLET | Refills: 0 | Status: SHIPPED | OUTPATIENT
Start: 2019-10-30 | End: 2020-10-07

## 2019-10-31 ENCOUNTER — SNF/IP PROF CHARGE ONLY (OUTPATIENT)
Dept: HEMATOLOGY/ONCOLOGY | Facility: HOSPITAL | Age: 59
End: 2019-10-31

## 2019-10-31 DIAGNOSIS — C48.2 MALIGNANT NEOPLASM OF PERITONEUM (HCC): ICD-10-CM

## 2019-10-31 PROCEDURE — G9678 ONCOLOGY CARE MODEL SERVICE: HCPCS | Performed by: INTERNAL MEDICINE

## 2019-11-04 ENCOUNTER — TELEPHONE (OUTPATIENT)
Dept: PULMONOLOGY | Facility: CLINIC | Age: 59
End: 2019-11-04

## 2019-11-04 NOTE — TELEPHONE ENCOUNTER
Called and spoke with pt, informed her of results and stated our office could send over CPAP order to [facility], pt declined. Pt states she is going to try an oral apparatus for sleep apnea. Pt has no further questions at this time.

## 2019-11-04 NOTE — TELEPHONE ENCOUNTER
----- Message from Olga Rivers MD sent at 10/30/2019  5:14 PM CDT -----  RN, please call the patient, explained that she has moderately severe obstructive sleep apnea and facilitate CPAP titration

## 2019-11-11 RX ORDER — CITALOPRAM HYDROBROMIDE 10 MG/1
TABLET ORAL
Qty: 90 TABLET | Refills: 3 | Status: SHIPPED | OUTPATIENT
Start: 2019-11-11 | End: 2020-01-06

## 2019-11-13 RX ORDER — LETROZOLE 2.5 MG/1
TABLET, FILM COATED ORAL
Qty: 90 TABLET | Refills: 1 | Status: SHIPPED | OUTPATIENT
Start: 2019-11-13 | End: 2020-05-11

## 2019-11-25 ENCOUNTER — TELEPHONE (OUTPATIENT)
Dept: ENDOCRINOLOGY CLINIC | Facility: CLINIC | Age: 59
End: 2019-11-25

## 2019-11-25 RX ORDER — CARVEDILOL 12.5 MG/1
TABLET ORAL
Qty: 180 TABLET | Refills: 0 | Status: SHIPPED | OUTPATIENT
Start: 2019-11-25 | End: 2020-03-16

## 2019-11-30 ENCOUNTER — SNF/IP PROF CHARGE ONLY (OUTPATIENT)
Dept: HEMATOLOGY/ONCOLOGY | Facility: HOSPITAL | Age: 59
End: 2019-11-30

## 2019-11-30 DIAGNOSIS — C48.2 MALIGNANT NEOPLASM OF PERITONEUM (HCC): ICD-10-CM

## 2019-11-30 PROCEDURE — G9678 ONCOLOGY CARE MODEL SERVICE: HCPCS | Performed by: INTERNAL MEDICINE

## 2019-12-02 ENCOUNTER — HOSPITAL ENCOUNTER (OUTPATIENT)
Dept: CT IMAGING | Facility: HOSPITAL | Age: 59
Discharge: HOME | End: 2019-12-02
Attending: INTERNAL MEDICINE
Payer: MEDICARE

## 2019-12-02 ENCOUNTER — NURSE ONLY (OUTPATIENT)
Dept: HEMATOLOGY/ONCOLOGY | Facility: HOSPITAL | Age: 59
End: 2019-12-02
Attending: INTERNAL MEDICINE
Payer: MEDICARE

## 2019-12-02 DIAGNOSIS — Z45.2 ENCOUNTER FOR ADJUSTMENT OR MANAGEMENT OF VASCULAR ACCESS DEVICE: Primary | ICD-10-CM

## 2019-12-02 DIAGNOSIS — C48.2 PRIMARY PERITONEAL CARCINOMATOSIS (HCC): ICD-10-CM

## 2019-12-02 DIAGNOSIS — C48.2 MALIGNANT NEOPLASM OF PERITONEUM (HCC): ICD-10-CM

## 2019-12-02 LAB — CREAT BLD-MCNC: 0.7 MG/DL (ref 0.55–1.02)

## 2019-12-02 PROCEDURE — 85025 COMPLETE CBC W/AUTO DIFF WBC: CPT

## 2019-12-02 PROCEDURE — 80053 COMPREHEN METABOLIC PANEL: CPT

## 2019-12-02 PROCEDURE — 74177 CT ABD & PELVIS W/CONTRAST: CPT | Performed by: INTERNAL MEDICINE

## 2019-12-02 PROCEDURE — 82565 ASSAY OF CREATININE: CPT

## 2019-12-02 PROCEDURE — 36591 DRAW BLOOD OFF VENOUS DEVICE: CPT

## 2019-12-02 PROCEDURE — 71260 CT THORAX DX C+: CPT | Performed by: INTERNAL MEDICINE

## 2019-12-02 RX ORDER — HEPARIN SODIUM (PORCINE) LOCK FLUSH IV SOLN 100 UNIT/ML 100 UNIT/ML
5 SOLUTION INTRAVENOUS ONCE
Status: COMPLETED | OUTPATIENT
Start: 2019-12-02 | End: 2019-12-02

## 2019-12-02 RX ORDER — 0.9 % SODIUM CHLORIDE 0.9 %
10 VIAL (ML) INJECTION ONCE
Status: CANCELLED | OUTPATIENT
Start: 2019-12-02

## 2019-12-02 RX ORDER — 0.9 % SODIUM CHLORIDE 0.9 %
VIAL (ML) INJECTION
Status: DISCONTINUED
Start: 2019-12-02 | End: 2019-12-02

## 2019-12-02 RX ORDER — HEPARIN SODIUM (PORCINE) LOCK FLUSH IV SOLN 100 UNIT/ML 100 UNIT/ML
5 SOLUTION INTRAVENOUS ONCE
Status: CANCELLED | OUTPATIENT
Start: 2019-12-02

## 2019-12-02 RX ADMIN — HEPARIN SODIUM (PORCINE) LOCK FLUSH IV SOLN 100 UNIT/ML 500 UNITS: 100 SOLUTION INTRAVENOUS at 15:00:00

## 2019-12-17 ENCOUNTER — APPOINTMENT (OUTPATIENT)
Dept: HEMATOLOGY/ONCOLOGY | Facility: HOSPITAL | Age: 59
End: 2019-12-17
Attending: INTERNAL MEDICINE
Payer: MEDICARE

## 2019-12-17 VITALS
TEMPERATURE: 98 F | WEIGHT: 208.13 LBS | HEART RATE: 67 BPM | RESPIRATION RATE: 16 BRPM | OXYGEN SATURATION: 97 % | SYSTOLIC BLOOD PRESSURE: 129 MMHG | HEIGHT: 64 IN | DIASTOLIC BLOOD PRESSURE: 67 MMHG | BODY MASS INDEX: 35.53 KG/M2

## 2019-12-17 DIAGNOSIS — Z51.11 ENCOUNTER FOR CHEMOTHERAPY MANAGEMENT: ICD-10-CM

## 2019-12-17 DIAGNOSIS — K76.9 LIVER LESION: ICD-10-CM

## 2019-12-17 DIAGNOSIS — M25.561 ARTHRALGIA OF BOTH KNEES: ICD-10-CM

## 2019-12-17 DIAGNOSIS — M25.562 ARTHRALGIA OF BOTH KNEES: ICD-10-CM

## 2019-12-17 DIAGNOSIS — K55.069 SUPERIOR MESENTERIC VEIN THROMBOSIS (HCC): Primary | ICD-10-CM

## 2019-12-17 DIAGNOSIS — C48.2 MALIGNANT NEOPLASM OF PERITONEUM (HCC): ICD-10-CM

## 2019-12-17 DIAGNOSIS — C56.9 MALIGNANT NEOPLASM OF OVARY, UNSPECIFIED LATERALITY (HCC): ICD-10-CM

## 2019-12-17 PROCEDURE — 99215 OFFICE O/P EST HI 40 MIN: CPT | Performed by: INTERNAL MEDICINE

## 2019-12-17 NOTE — PROGRESS NOTES
Cancer Center Progress Note    Patient Name: Luis Mock   YOB: 1960   Medical Record Number: [id number]   Attending Physician: Heriberto Ospina M.D.      Chief Complaint:  F/u primary peritoneal carcinoma    History of Present Illness:  Aysha months of anticoagulation with rivaroxaban. Thrombus was resolved on subsequent imaging    Interval history:  She is returning for routine follow-up. She is doing well overall. She denies abdominal pain. She denies any bruising or bleeding.   She denie Noel Shin MD at [address] [place] [address]   • PARISH LOCALIZATION WIRE 1 SITE LEFT (CPT=19281)     •      • OTHER SURGICAL HISTORY  2015    Cytoreductive surgery with peritonectomy,   En bloc small bowel resection, partial colectomy, partial cystectomy, parti Quit date: 1988        Years since quittin.9      Smokeless tobacco: Never Used    Substance and Sexual Activity      Alcohol use: No      Drug use: No      Sexual activity: Not on file    Lifestyle      Physical activity:        Days per wee PACK) Does not apply Misc, 1 each by Does not apply route every 3 (three) days. , Disp: 6 each, Rfl: 3  •  Glucose Blood (FREESTYLE TEST) In Vitro Strip, Use to test BG 3 times daily. , Disp: 300 strip, Rfl: 5  •  atorvastatin 40 MG Oral Tab, Take 1 tablet ( supple. Lymphatics: There is no palpable peripheral lymphadenopathy   Chest: Symmetric expansion  Heart: good pulses. Abdomen: Soft, non tender. No hepatosplenomegaly. No palpable mass. Extremities: No edema. Neurological: REA motor 5/5 x4.      Lab Evelina (12/2013).   Peritoneal implants were noted and final pathology revealed \"findings are most suggestive of ovarian invasive implants with psammoma bodies\".     She currently is on treatment with letrozole for a low-grade serous carcinoma staining po Resolved on imaging. Given she has completed 6 months of anticoagulation is okay to monitor her off anticoagulation at this time. –History of hypomag.   Recheck at follow-up this is likely secondary to her chronic diarrhea    The patient's emotional well

## 2020-01-06 RX ORDER — CITALOPRAM HYDROBROMIDE 10 MG/1
TABLET ORAL
Qty: 90 TABLET | Refills: 3 | Status: SHIPPED | OUTPATIENT
Start: 2020-01-06 | End: 2021-03-03

## 2020-01-06 RX ORDER — ATORVASTATIN CALCIUM 40 MG/1
TABLET, FILM COATED ORAL
Qty: 90 TABLET | Refills: 0 | Status: SHIPPED | OUTPATIENT
Start: 2020-01-06 | End: 2020-02-26

## 2020-01-07 ENCOUNTER — TELEPHONE (OUTPATIENT)
Dept: ENDOCRINOLOGY CLINIC | Facility: CLINIC | Age: 60
End: 2020-01-07

## 2020-01-07 DIAGNOSIS — E11.8 UNCONTROLLED TYPE 2 DIABETES MELLITUS WITH COMPLICATION, WITH LONG-TERM CURRENT USE OF INSULIN (HCC): Primary | ICD-10-CM

## 2020-01-07 DIAGNOSIS — E11.65 UNCONTROLLED TYPE 2 DIABETES MELLITUS WITH COMPLICATION, WITH LONG-TERM CURRENT USE OF INSULIN (HCC): Primary | ICD-10-CM

## 2020-01-07 DIAGNOSIS — Z79.4 UNCONTROLLED TYPE 2 DIABETES MELLITUS WITH COMPLICATION, WITH LONG-TERM CURRENT USE OF INSULIN (HCC): Primary | ICD-10-CM

## 2020-01-07 RX ORDER — INSULIN LISPRO 100 [IU]/ML
INJECTION, SOLUTION INTRAVENOUS; SUBCUTANEOUS
Qty: 180 ML | Refills: 1 | Status: SHIPPED | OUTPATIENT
Start: 2020-01-07

## 2020-01-07 NOTE — TELEPHONE ENCOUNTER
Response requested: Alternative Requested    Pharmacy comments:   Alternative requested:  Please send a NEW Rx with ICD-10 Code on hard copy        Current Outpatient Medications   Medication Sig Dispense Refill   • Insulin Lispro (HUMALOG) 100 UNIT/ML Sub

## 2020-02-26 ENCOUNTER — OFFICE VISIT (OUTPATIENT)
Dept: ENDOCRINOLOGY CLINIC | Facility: CLINIC | Age: 60
End: 2020-02-26
Payer: MEDICARE

## 2020-02-26 ENCOUNTER — TELEPHONE (OUTPATIENT)
Dept: ENDOCRINOLOGY CLINIC | Facility: CLINIC | Age: 60
End: 2020-02-26

## 2020-02-26 VITALS
SYSTOLIC BLOOD PRESSURE: 115 MMHG | DIASTOLIC BLOOD PRESSURE: 77 MMHG | HEART RATE: 81 BPM | BODY MASS INDEX: 34 KG/M2 | WEIGHT: 198.81 LBS

## 2020-02-26 DIAGNOSIS — E11.65 UNCONTROLLED TYPE 2 DIABETES MELLITUS WITH HYPERGLYCEMIA (HCC): Primary | ICD-10-CM

## 2020-02-26 LAB
CARTRIDGE LOT#: ABNORMAL NUMERIC
GLUCOSE BLOOD: 238
HEMOGLOBIN A1C: 10 % (ref 4.3–5.6)
TEST STRIP LOT #: NORMAL NUMERIC

## 2020-02-26 PROCEDURE — 99214 OFFICE O/P EST MOD 30 MIN: CPT | Performed by: INTERNAL MEDICINE

## 2020-02-26 PROCEDURE — 36416 COLLJ CAPILLARY BLOOD SPEC: CPT | Performed by: INTERNAL MEDICINE

## 2020-02-26 PROCEDURE — 83036 HEMOGLOBIN GLYCOSYLATED A1C: CPT | Performed by: INTERNAL MEDICINE

## 2020-02-26 PROCEDURE — G0463 HOSPITAL OUTPT CLINIC VISIT: HCPCS | Performed by: INTERNAL MEDICINE

## 2020-02-26 PROCEDURE — 82962 GLUCOSE BLOOD TEST: CPT | Performed by: INTERNAL MEDICINE

## 2020-02-26 RX ORDER — ATORVASTATIN CALCIUM 40 MG/1
TABLET, FILM COATED ORAL
Qty: 90 TABLET | Refills: 2 | OUTPATIENT
Start: 2020-02-26 | End: 2020-02-26

## 2020-02-26 RX ORDER — ATORVASTATIN CALCIUM 40 MG/1
TABLET, FILM COATED ORAL
Qty: 90 TABLET | Refills: 2 | Status: SHIPPED | OUTPATIENT
Start: 2020-02-26 | End: 2020-03-16

## 2020-02-26 NOTE — PROGRESS NOTES
Name: Belkys Ford  Date: 2/26/2020    Referring Physician: No ref. provider found    HISTORY OF PRESENT ILLNESS   Belkys Ford is a 60year old female who presents for diabetes mellitus.   She is now maintained on letrozole for ovarian metastatic CA, TABLET BY MOUTH TWICE A DAY WITH FOOD, Disp: 180 tablet, Rfl: 0  •  NON FORMULARY, Cholestyramine Aspartame 4 g daily, Disp: , Rfl:   •  Insulin Disposable Pump (OMNIPOD 5 PACK) Does not apply Misc, 1 each by Does not apply route every 3 (three) days. , Dis Past Medical History:   Diagnosis Date   • Anxiety 5 years    During Cancer Diagnosis   • Appendicitis    • Depression 5years    Since cancer diagnosis   • DM2 (diabetes mellitus, type 2) ([facility] [address]     medication   • Essential hypertension    • Fibroids • OTHER SURGICAL HISTORY  07/21/2015    Ileostomy takedown,   Left subclavian vein single-lumen port placement with fluoroscopic guidance.    • PERITONECTOMY WITH HYPERTHEMIC INTRAPERITONEAL CHEMOTHERAPY (HIPEC) N/A 3/31/2015    Performed by Dg Hernandez the patient and/or coordinating care.     RTC 3 months     2/26/2020  Misael العراقي MD

## 2020-02-26 NOTE — TELEPHONE ENCOUNTER
Patient seen today in clinic, patient indicates nurse visit on 3/10/20 at 12:15 PM at [facility], [facility] is preferred instead of 11:00AM time slot.  Patient indicates monitor will be applied, please call patient to advise if appointment time can be change to 12:15, thanks

## 2020-02-27 ENCOUNTER — TELEPHONE (OUTPATIENT)
Dept: ENDOCRINOLOGY CLINIC | Facility: CLINIC | Age: 60
End: 2020-02-27

## 2020-02-27 ENCOUNTER — PATIENT MESSAGE (OUTPATIENT)
Dept: ENDOCRINOLOGY CLINIC | Facility: CLINIC | Age: 60
End: 2020-02-27

## 2020-02-27 NOTE — TELEPHONE ENCOUNTER
Pt. wants nurse to know that she is not able to get CT Scan done on 3/10/20 with monitor on. Pt. Wants to know if her Nurse appt. Can be changed to 3/17 at [facility].  Pt. States that she has a f/up appt. sched with Dr Eulalio Yan at 1:00pm., Can Nurse be sched around 1

## 2020-02-28 NOTE — TELEPHONE ENCOUNTER
From: Barbie Godinez  To: Luis Muro MD  Sent: 2/27/2020 8:53 PM CST  Subject: Other    Regarding my March 10th Nurse Appointment, please cancel. CT no longer allows pts to wear external devices during procedures.      I will be at [address] [place] [address] on March 17th for

## 2020-03-10 ENCOUNTER — NURSE ONLY (OUTPATIENT)
Dept: HEMATOLOGY/ONCOLOGY | Facility: HOSPITAL | Age: 60
End: 2020-03-10
Attending: INTERNAL MEDICINE
Payer: MEDICARE

## 2020-03-10 ENCOUNTER — HOSPITAL ENCOUNTER (OUTPATIENT)
Dept: CT IMAGING | Facility: HOSPITAL | Age: 60
Discharge: HOME | End: 2020-03-10
Attending: INTERNAL MEDICINE
Payer: MEDICARE

## 2020-03-10 DIAGNOSIS — K76.9 LIVER LESION: ICD-10-CM

## 2020-03-10 DIAGNOSIS — C48.2 MALIGNANT NEOPLASM OF PERITONEUM (HCC): ICD-10-CM

## 2020-03-10 DIAGNOSIS — C56.9 MALIGNANT NEOPLASM OF OVARY, UNSPECIFIED LATERALITY (HCC): ICD-10-CM

## 2020-03-10 DIAGNOSIS — C48.2 MALIGNANT NEOPLASM OF PERITONEUM (HCC): Primary | ICD-10-CM

## 2020-03-10 DIAGNOSIS — Z45.2 ENCOUNTER FOR ADJUSTMENT OR MANAGEMENT OF VASCULAR ACCESS DEVICE: Primary | ICD-10-CM

## 2020-03-10 LAB
ALBUMIN SERPL-MCNC: 4.1 G/DL (ref 3.4–5)
ALBUMIN/GLOB SERPL: 1.3 {RATIO} (ref 1–2)
ALP LIVER SERPL-CCNC: 77 U/L (ref 46–118)
ALT SERPL-CCNC: 66 U/L (ref 13–56)
ANION GAP SERPL CALC-SCNC: 7 MMOL/L (ref 0–18)
AST SERPL-CCNC: 44 U/L (ref 15–37)
BASOPHILS # BLD AUTO: 0.03 X10(3) UL (ref 0–0.2)
BASOPHILS NFR BLD AUTO: 0.5 %
BILIRUB SERPL-MCNC: 2.6 MG/DL (ref 0.1–2)
BUN BLD-MCNC: 12 MG/DL (ref 7–18)
BUN/CREAT SERPL: 13.3 (ref 10–20)
CALCIUM BLD-MCNC: 8.4 MG/DL (ref 8.5–10.1)
CANCER AG125 SERPL-ACNC: 8.1 U/ML (ref ?–35)
CHLORIDE SERPL-SCNC: 102 MMOL/L (ref 98–112)
CO2 SERPL-SCNC: 25 MMOL/L (ref 21–32)
CREAT BLD-MCNC: 0.8 MG/DL (ref 0.55–1.02)
CREAT BLD-MCNC: 0.9 MG/DL (ref 0.55–1.02)
DEPRECATED RDW RBC AUTO: 42.6 FL (ref 35.1–46.3)
EOSINOPHIL # BLD AUTO: 0.13 X10(3) UL (ref 0–0.7)
EOSINOPHIL NFR BLD AUTO: 2.1 %
ERYTHROCYTE [DISTWIDTH] IN BLOOD BY AUTOMATED COUNT: 13.1 % (ref 11–15)
GLOBULIN PLAS-MCNC: 3.2 G/DL (ref 2.8–4.4)
GLUCOSE BLD-MCNC: 292 MG/DL (ref 70–99)
HCT VFR BLD AUTO: 41 % (ref 35–48)
HGB BLD-MCNC: 14.3 G/DL (ref 12–16)
IMM GRANULOCYTES # BLD AUTO: 0.02 X10(3) UL (ref 0–1)
IMM GRANULOCYTES NFR BLD: 0.3 %
LYMPHOCYTES # BLD AUTO: 1.46 X10(3) UL (ref 1–4)
LYMPHOCYTES NFR BLD AUTO: 23.6 %
M PROTEIN MFR SERPL ELPH: 7.3 G/DL (ref 6.4–8.2)
MCH RBC QN AUTO: 31.1 PG (ref 26–34)
MCHC RBC AUTO-ENTMCNC: 34.9 G/DL (ref 31–37)
MCV RBC AUTO: 89.1 FL (ref 80–100)
MONOCYTES # BLD AUTO: 0.4 X10(3) UL (ref 0.1–1)
MONOCYTES NFR BLD AUTO: 6.5 %
NEUTROPHILS # BLD AUTO: 4.14 X10 (3) UL (ref 1.5–7.7)
NEUTROPHILS # BLD AUTO: 4.14 X10(3) UL (ref 1.5–7.7)
NEUTROPHILS NFR BLD AUTO: 67 %
OSMOLALITY SERPL CALC.SUM OF ELEC: 289 MOSM/KG (ref 275–295)
PATIENT FASTING Y/N/NP: NO
PLATELET # BLD AUTO: 155 10(3)UL (ref 150–450)
POTASSIUM SERPL-SCNC: 3.8 MMOL/L (ref 3.5–5.1)
RBC # BLD AUTO: 4.6 X10(6)UL (ref 3.8–5.3)
SODIUM SERPL-SCNC: 134 MMOL/L (ref 136–145)
WBC # BLD AUTO: 6.2 X10(3) UL (ref 4–11)

## 2020-03-10 PROCEDURE — 86304 IMMUNOASSAY TUMOR CA 125: CPT

## 2020-03-10 PROCEDURE — 80053 COMPREHEN METABOLIC PANEL: CPT

## 2020-03-10 PROCEDURE — 74177 CT ABD & PELVIS W/CONTRAST: CPT | Performed by: INTERNAL MEDICINE

## 2020-03-10 PROCEDURE — 85025 COMPLETE CBC W/AUTO DIFF WBC: CPT

## 2020-03-10 PROCEDURE — 82565 ASSAY OF CREATININE: CPT

## 2020-03-10 PROCEDURE — 36591 DRAW BLOOD OFF VENOUS DEVICE: CPT

## 2020-03-10 PROCEDURE — 71260 CT THORAX DX C+: CPT | Performed by: INTERNAL MEDICINE

## 2020-03-10 RX ORDER — 0.9 % SODIUM CHLORIDE 0.9 %
10 VIAL (ML) INJECTION ONCE
Status: CANCELLED | OUTPATIENT
Start: 2020-03-10

## 2020-03-10 RX ORDER — 0.9 % SODIUM CHLORIDE 0.9 %
VIAL (ML) INJECTION
Status: DISCONTINUED
Start: 2020-03-10 | End: 2020-03-10

## 2020-03-10 RX ORDER — HEPARIN SODIUM (PORCINE) LOCK FLUSH IV SOLN 100 UNIT/ML 100 UNIT/ML
5 SOLUTION INTRAVENOUS ONCE
Status: COMPLETED | OUTPATIENT
Start: 2020-03-10 | End: 2020-03-10

## 2020-03-10 RX ORDER — HEPARIN SODIUM (PORCINE) LOCK FLUSH IV SOLN 100 UNIT/ML 100 UNIT/ML
5 SOLUTION INTRAVENOUS ONCE
Status: CANCELLED | OUTPATIENT
Start: 2020-03-10

## 2020-03-10 RX ADMIN — HEPARIN SODIUM (PORCINE) LOCK FLUSH IV SOLN 100 UNIT/ML 500 UNITS: 100 SOLUTION INTRAVENOUS at 13:44:00

## 2020-03-16 ENCOUNTER — PATIENT MESSAGE (OUTPATIENT)
Dept: ENDOCRINOLOGY CLINIC | Facility: CLINIC | Age: 60
End: 2020-03-16

## 2020-03-16 ENCOUNTER — TELEPHONE (OUTPATIENT)
Dept: ENDOCRINOLOGY CLINIC | Facility: CLINIC | Age: 60
End: 2020-03-16

## 2020-03-16 RX ORDER — INSULIN PUMP CART,CONT INF,RF
1 CARTRIDGE (EA) SUBCUTANEOUS
Qty: 45 EACH | Refills: 1 | Status: SHIPPED | OUTPATIENT
Start: 2020-03-16 | End: 2020-06-23

## 2020-03-16 RX ORDER — INSULIN PUMP CART,CONT INF,RF
1 CARTRIDGE (EA) SUBCUTANEOUS
Qty: 45 EACH | Refills: 1 | Status: SHIPPED | OUTPATIENT
Start: 2020-03-16 | End: 2020-03-16

## 2020-03-16 NOTE — TELEPHONE ENCOUNTER
RN spoke with patient and advised her to reschedule Li placement at a later time due to COVID-19 outbreak. RN resent order to mail order pharm for updated script. RN to follow-up later today 3/16/20.

## 2020-03-16 NOTE — TELEPHONE ENCOUNTER
RN spoke with [facility] mail order pharmacy. The new order is not in process yet but it has been rec'd. Pharm states she will put a note on the order to update amt to 45 pods and send STAT.      Difficult to find pt in system so use OLD order number [id number]

## 2020-03-16 NOTE — TELEPHONE ENCOUNTER
From: Win Keen  To: Cami Valencia MD  Sent: 3/16/2020 9:01 AM CDT  Subject: Visit Follow-up Question    Please have Radha Kaye from Dr Jeison Fitzgerald  [place] office call me to re-schedule the Nurse visit for application of Glucose Monitor for an afternoon appoin

## 2020-03-16 NOTE — TELEPHONE ENCOUNTER
Called pt back. Scheduled apt in [facility] for miriam placement. Pt also states that since last visit, her pump was adjusted and she is now going through her pods a lot faster.  States she tried getting a refill from [facility] but was told she could not get one

## 2020-03-17 ENCOUNTER — APPOINTMENT (OUTPATIENT)
Dept: HEMATOLOGY/ONCOLOGY | Facility: HOSPITAL | Age: 60
End: 2020-03-17
Attending: INTERNAL MEDICINE
Payer: MEDICARE

## 2020-03-17 RX ORDER — CARVEDILOL 12.5 MG/1
TABLET ORAL
Qty: 180 TABLET | Refills: 1 | Status: SHIPPED | OUTPATIENT
Start: 2020-03-17 | End: 2020-09-08

## 2020-03-17 RX ORDER — ATORVASTATIN CALCIUM 40 MG/1
TABLET, FILM COATED ORAL
Qty: 90 TABLET | Refills: 2 | Status: SHIPPED | OUTPATIENT
Start: 2020-03-17 | End: 2020-10-05

## 2020-05-11 RX ORDER — LETROZOLE 2.5 MG/1
TABLET, FILM COATED ORAL
Qty: 90 TABLET | Refills: 1 | Status: SHIPPED | OUTPATIENT
Start: 2020-05-11 | End: 2020-12-09

## 2020-06-22 ENCOUNTER — PATIENT MESSAGE (OUTPATIENT)
Dept: ENDOCRINOLOGY CLINIC | Facility: CLINIC | Age: 60
End: 2020-06-22

## 2020-06-23 ENCOUNTER — OFFICE VISIT (OUTPATIENT)
Dept: HEMATOLOGY/ONCOLOGY | Facility: HOSPITAL | Age: 60
End: 2020-06-23
Attending: INTERNAL MEDICINE
Payer: MEDICARE

## 2020-06-23 VITALS
SYSTOLIC BLOOD PRESSURE: 144 MMHG | RESPIRATION RATE: 16 BRPM | WEIGHT: 204.38 LBS | HEART RATE: 84 BPM | HEIGHT: 64 IN | BODY MASS INDEX: 34.89 KG/M2 | DIASTOLIC BLOOD PRESSURE: 70 MMHG | OXYGEN SATURATION: 94 %

## 2020-06-23 DIAGNOSIS — K55.069 SUPERIOR MESENTERIC VEIN THROMBOSIS (HCC): Primary | ICD-10-CM

## 2020-06-23 DIAGNOSIS — M25.562 ARTHRALGIA OF BOTH KNEES: ICD-10-CM

## 2020-06-23 DIAGNOSIS — M25.561 ARTHRALGIA OF BOTH KNEES: ICD-10-CM

## 2020-06-23 DIAGNOSIS — C48.2 PRIMARY PERITONEAL CARCINOMATOSIS (HCC): ICD-10-CM

## 2020-06-23 DIAGNOSIS — Z51.11 ENCOUNTER FOR CHEMOTHERAPY MANAGEMENT: ICD-10-CM

## 2020-06-23 DIAGNOSIS — K76.9 LIVER LESION: ICD-10-CM

## 2020-06-23 DIAGNOSIS — Z45.2 ENCOUNTER FOR ADJUSTMENT OR MANAGEMENT OF VASCULAR ACCESS DEVICE: Primary | ICD-10-CM

## 2020-06-23 DIAGNOSIS — C48.2 MALIGNANT NEOPLASM OF PERITONEUM (HCC): ICD-10-CM

## 2020-06-23 PROCEDURE — 86304 IMMUNOASSAY TUMOR CA 125: CPT

## 2020-06-23 PROCEDURE — 80053 COMPREHEN METABOLIC PANEL: CPT

## 2020-06-23 PROCEDURE — 36591 DRAW BLOOD OFF VENOUS DEVICE: CPT

## 2020-06-23 PROCEDURE — 85025 COMPLETE CBC W/AUTO DIFF WBC: CPT

## 2020-06-23 PROCEDURE — 99215 OFFICE O/P EST HI 40 MIN: CPT | Performed by: INTERNAL MEDICINE

## 2020-06-23 RX ORDER — 0.9 % SODIUM CHLORIDE 0.9 %
VIAL (ML) INJECTION
Status: DISCONTINUED
Start: 2020-06-23 | End: 2020-06-23

## 2020-06-23 RX ORDER — INSULIN PUMP CART,CONT INF,RF
1 CARTRIDGE (EA) SUBCUTANEOUS
Qty: 45 EACH | Refills: 1 | Status: SHIPPED | OUTPATIENT
Start: 2020-06-23 | End: 2020-08-10

## 2020-06-23 RX ORDER — HEPARIN SODIUM (PORCINE) LOCK FLUSH IV SOLN 100 UNIT/ML 100 UNIT/ML
5 SOLUTION INTRAVENOUS ONCE
Status: COMPLETED | OUTPATIENT
Start: 2020-06-23 | End: 2020-06-23

## 2020-06-23 RX ORDER — HEPARIN SODIUM (PORCINE) LOCK FLUSH IV SOLN 100 UNIT/ML 100 UNIT/ML
5 SOLUTION INTRAVENOUS ONCE
Status: CANCELLED | OUTPATIENT
Start: 2020-06-23

## 2020-06-23 RX ORDER — 0.9 % SODIUM CHLORIDE 0.9 %
10 VIAL (ML) INJECTION ONCE
Status: CANCELLED | OUTPATIENT
Start: 2020-06-23

## 2020-06-23 RX ADMIN — HEPARIN SODIUM (PORCINE) LOCK FLUSH IV SOLN 100 UNIT/ML 500 UNITS: 100 SOLUTION INTRAVENOUS at 13:00:00

## 2020-06-23 NOTE — PROGRESS NOTES
Cancer Center Progress Note    Patient Name: Maribell Brown   YOB: 1960   Medical Record Number: [id number]   Attending Physician: Perez Otto M.D.      Chief Complaint:  F/u primary peritoneal carcinoma    History of Present Illness:  Aysha months of anticoagulation with rivaroxaban. Thrombus was resolved on subsequent imaging    Interval history:  She is returning for routine follow-up. She is doing well overall. She denies abdominal pain. She denies any bruising or bleeding.   She denie Diaz Colon MD at [address]   • PARISH LOCALIZATION WIRE 1 SITE LEFT (CPT=19281)     •      • OTHER SURGICAL HISTORY  2015    Cytoreductive surgery with peritonectomy,   En bloc small bowel resection, partial colectomy, partial cystectomy, parti Quit date: 1988        Years since quittin.4      Smokeless tobacco: Never Used    Substance and Sexual Activity      Alcohol use: No      Drug use: No      Sexual activity: Not on file    Lifestyle      Physical activity:        Days per wee Subcutaneous Solution, 200 units via insulin pump, Disp: 180 mL, Rfl: 1  •  CITALOPRAM HYDROBROMIDE 10 MG Oral Tab, TAKE 1 TABLET BY MOUTH EVERY DAY, Disp: 90 tablet, Rfl: 3  •  CARVEDILOL 12.5 MG Oral Tab, TAKE 1 TABLET BY MOUTH TWICE A DAY WITH FOOD, Dis Results  Component Value Date   RBC 4.18 11/01/2016   HGB 11.0* 11/01/2016   HCT 34.0* 11/01/2016   MCV 81.3 11/01/2016   MCH 26.4* 11/01/2016   MCHC 32.5 11/01/2016   RDW 18.2* 11/01/2016   NEPRELIM 3.61 07/25/2015   WBC 6.4 11/01/2016    11/01/201 receptor  --Treated by Dr. Mary Hall and is s/p 6 cycles of carbo/taxol (completed 8/2014) per recommendation of [address] [place] [address]. --A CT on 1/15/15 revealed new omental/mesenteric tumors:  In March 2015 she underwent Cytoreductive surgery, peritonectomy, en bloc small anam family.      Carlos Ross MD

## 2020-07-03 ENCOUNTER — TELEPHONE (OUTPATIENT)
Dept: ENDOCRINOLOGY CLINIC | Facility: CLINIC | Age: 60
End: 2020-07-03

## 2020-08-07 DIAGNOSIS — E11.65 UNCONTROLLED TYPE 2 DIABETES MELLITUS WITH HYPERGLYCEMIA (HCC): Primary | ICD-10-CM

## 2020-08-10 ENCOUNTER — PATIENT MESSAGE (OUTPATIENT)
Dept: ENDOCRINOLOGY CLINIC | Facility: CLINIC | Age: 60
End: 2020-08-10

## 2020-08-10 ENCOUNTER — TELEPHONE (OUTPATIENT)
Dept: HEMATOLOGY/ONCOLOGY | Facility: HOSPITAL | Age: 60
End: 2020-08-10

## 2020-08-10 RX ORDER — INSULIN PUMP CART,CONT INF,RF
CARTRIDGE (EA) SUBCUTANEOUS
Qty: 45 EACH | Refills: 1 | Status: SHIPPED | OUTPATIENT
Start: 2020-08-10 | End: 2020-10-07

## 2020-08-10 RX ORDER — INSULIN PUMP CART,CONT INF,RF
1 CARTRIDGE (EA) SUBCUTANEOUS
Qty: 45 EACH | Refills: 1 | Status: SHIPPED | OUTPATIENT
Start: 2020-08-10 | End: 2021-04-01

## 2020-08-10 NOTE — TELEPHONE ENCOUNTER
Daylin Herrera calling asking if her 9/22 1pm can be changed to a tele visit.  Her last visit she waited a long time for the Dr. Stroud  gabriel Abdi

## 2020-08-10 NOTE — TELEPHONE ENCOUNTER
LOV: 2/2020. Called patient to schedule OV. Patient requested telehealth visit. telehealth visit scheduled for 10/29/20. Patient is agreeable to A1c prior to telehealth visit. Pended lab for A1c and omnipod.   This appt has been added to the waitlist and

## 2020-08-10 NOTE — TELEPHONE ENCOUNTER
Returned call to [place]. [place] states that she does not want to come in for her appointment on 9/22. She stated that her anxiery about covid is way to high and last time she ended up wating for the doctor a long time d/t an emergency.   I explained that

## 2020-08-10 NOTE — TELEPHONE ENCOUNTER
From: David Sosa  To: Atul Gupta MD  Sent: 8/10/2020 12:26 PM CDT  Subject: Prescription Question    Good Afternoon,  [facility] mail order states they have delayed my Omnipod order because I have zero refills left.    Thank you,  rTi Pimentel

## 2020-08-14 ENCOUNTER — TELEPHONE (OUTPATIENT)
Dept: HEMATOLOGY/ONCOLOGY | Facility: HOSPITAL | Age: 60
End: 2020-08-14

## 2020-08-14 NOTE — TELEPHONE ENCOUNTER
Spoke with Ricki Main. Dr Jennifer Walter always wants to see his patients in the office after a scan. He does not want to review results over the phone. She can push off the scans and labs unitl she feels comfortable or come in as planned on 9/22.   Ricki Main states she

## 2020-08-18 NOTE — TELEPHONE ENCOUNTER
Tiffanie Don calling for a ct order for patient. Spoke to [facility] C the CT is not due until December. Central scheduling notified.  sherlyn Clindamycin Pregnancy And Lactation Text: This medication can be used in pregnancy if certain situations. Clindamycin is also present in breast milk.

## 2020-09-09 RX ORDER — CARVEDILOL 12.5 MG/1
TABLET ORAL
Qty: 180 TABLET | Refills: 0 | Status: SHIPPED | OUTPATIENT
Start: 2020-09-09 | End: 2020-12-10

## 2020-09-10 RX ORDER — INSULIN ASPART 100 [IU]/ML
INJECTION, SOLUTION INTRAVENOUS; SUBCUTANEOUS
Qty: 18 VIAL | Refills: 1 | Status: SHIPPED | OUTPATIENT
Start: 2020-09-10 | End: 2021-03-04

## 2020-09-10 NOTE — TELEPHONE ENCOUNTER
•  insulin aspart (NOVOLOG) 100 UNIT/ML Subcutaneous Solution, Inject via insulin pump, maximum of 200 units daily, Disp: 18 vial, Rfl: 3

## 2020-09-15 ENCOUNTER — NURSE ONLY (OUTPATIENT)
Dept: HEMATOLOGY/ONCOLOGY | Facility: HOSPITAL | Age: 60
End: 2020-09-15
Attending: INTERNAL MEDICINE
Payer: MEDICARE

## 2020-09-15 ENCOUNTER — HOSPITAL ENCOUNTER (OUTPATIENT)
Dept: CT IMAGING | Facility: HOSPITAL | Age: 60
Discharge: HOME | End: 2020-09-15
Attending: INTERNAL MEDICINE
Payer: MEDICARE

## 2020-09-15 DIAGNOSIS — K55.069 SUPERIOR MESENTERIC VEIN THROMBOSIS (HCC): ICD-10-CM

## 2020-09-15 DIAGNOSIS — Z45.2 ENCOUNTER FOR ADJUSTMENT OR MANAGEMENT OF VASCULAR ACCESS DEVICE: Primary | ICD-10-CM

## 2020-09-15 DIAGNOSIS — C48.2 MALIGNANT NEOPLASM OF PERITONEUM (HCC): ICD-10-CM

## 2020-09-15 DIAGNOSIS — E11.65 UNCONTROLLED TYPE 2 DIABETES MELLITUS WITH HYPERGLYCEMIA (HCC): ICD-10-CM

## 2020-09-15 DIAGNOSIS — K76.9 LIVER LESION: ICD-10-CM

## 2020-09-15 DIAGNOSIS — C48.2 PRIMARY PERITONEAL CARCINOMATOSIS (HCC): ICD-10-CM

## 2020-09-15 LAB
ALBUMIN SERPL-MCNC: 3.6 G/DL (ref 3.4–5)
ALBUMIN/GLOB SERPL: 1 {RATIO} (ref 1–2)
ALP LIVER SERPL-CCNC: 82 U/L (ref 46–118)
ALT SERPL-CCNC: 68 U/L (ref 13–56)
ANION GAP SERPL CALC-SCNC: 4 MMOL/L (ref 0–18)
AST SERPL-CCNC: 56 U/L (ref 15–37)
BASOPHILS # BLD AUTO: 0.03 X10(3) UL (ref 0–0.2)
BASOPHILS NFR BLD AUTO: 0.5 %
BILIRUB SERPL-MCNC: 1.9 MG/DL (ref 0.1–2)
BUN BLD-MCNC: 11 MG/DL (ref 7–18)
BUN/CREAT SERPL: 12.6 (ref 10–20)
CALCIUM BLD-MCNC: 8.9 MG/DL (ref 8.5–10.1)
CANCER AG125 SERPL-ACNC: 8.9 U/ML (ref ?–35)
CHLORIDE SERPL-SCNC: 101 MMOL/L (ref 98–112)
CO2 SERPL-SCNC: 31 MMOL/L (ref 21–32)
CREAT BLD-MCNC: 0.87 MG/DL (ref 0.55–1.02)
DEPRECATED RDW RBC AUTO: 39.2 FL (ref 35.1–46.3)
EOSINOPHIL # BLD AUTO: 0.11 X10(3) UL (ref 0–0.7)
EOSINOPHIL NFR BLD AUTO: 1.9 %
ERYTHROCYTE [DISTWIDTH] IN BLOOD BY AUTOMATED COUNT: 12.2 % (ref 11–15)
EST. AVERAGE GLUCOSE BLD GHB EST-MCNC: 220 MG/DL (ref 68–126)
GLOBULIN PLAS-MCNC: 3.7 G/DL (ref 2.8–4.4)
GLUCOSE BLD-MCNC: 250 MG/DL (ref 70–99)
HBA1C MFR BLD HPLC: 9.3 % (ref ?–5.7)
HCT VFR BLD AUTO: 40.2 % (ref 35–48)
HGB BLD-MCNC: 14.1 G/DL (ref 12–16)
IMM GRANULOCYTES # BLD AUTO: 0.02 X10(3) UL (ref 0–1)
IMM GRANULOCYTES NFR BLD: 0.3 %
LYMPHOCYTES # BLD AUTO: 1.7 X10(3) UL (ref 1–4)
LYMPHOCYTES NFR BLD AUTO: 29.5 %
M PROTEIN MFR SERPL ELPH: 7.3 G/DL (ref 6.4–8.2)
MCH RBC QN AUTO: 30.8 PG (ref 26–34)
MCHC RBC AUTO-ENTMCNC: 35.1 G/DL (ref 31–37)
MCV RBC AUTO: 87.8 FL (ref 80–100)
MONOCYTES # BLD AUTO: 0.34 X10(3) UL (ref 0.1–1)
MONOCYTES NFR BLD AUTO: 5.9 %
NEUTROPHILS # BLD AUTO: 3.57 X10 (3) UL (ref 1.5–7.7)
NEUTROPHILS # BLD AUTO: 3.57 X10(3) UL (ref 1.5–7.7)
NEUTROPHILS NFR BLD AUTO: 61.9 %
OSMOLALITY SERPL CALC.SUM OF ELEC: 290 MOSM/KG (ref 275–295)
PLATELET # BLD AUTO: 169 10(3)UL (ref 150–450)
POTASSIUM SERPL-SCNC: 3.7 MMOL/L (ref 3.5–5.1)
RBC # BLD AUTO: 4.58 X10(6)UL (ref 3.8–5.3)
SODIUM SERPL-SCNC: 136 MMOL/L (ref 136–145)
WBC # BLD AUTO: 5.8 X10(3) UL (ref 4–11)

## 2020-09-15 PROCEDURE — 86304 IMMUNOASSAY TUMOR CA 125: CPT

## 2020-09-15 PROCEDURE — 74177 CT ABD & PELVIS W/CONTRAST: CPT | Performed by: INTERNAL MEDICINE

## 2020-09-15 PROCEDURE — 80053 COMPREHEN METABOLIC PANEL: CPT

## 2020-09-15 PROCEDURE — 85025 COMPLETE CBC W/AUTO DIFF WBC: CPT

## 2020-09-15 PROCEDURE — 71260 CT THORAX DX C+: CPT | Performed by: INTERNAL MEDICINE

## 2020-09-15 PROCEDURE — 36591 DRAW BLOOD OFF VENOUS DEVICE: CPT

## 2020-09-15 PROCEDURE — 82565 ASSAY OF CREATININE: CPT

## 2020-09-15 PROCEDURE — 83036 HEMOGLOBIN GLYCOSYLATED A1C: CPT

## 2020-09-15 RX ORDER — HEPARIN SODIUM (PORCINE) LOCK FLUSH IV SOLN 100 UNIT/ML 100 UNIT/ML
5 SOLUTION INTRAVENOUS ONCE
Status: COMPLETED | OUTPATIENT
Start: 2020-09-15 | End: 2020-09-15

## 2020-09-15 RX ORDER — 0.9 % SODIUM CHLORIDE 0.9 %
VIAL (ML) INJECTION
Status: DISCONTINUED
Start: 2020-09-15 | End: 2020-09-15

## 2020-09-15 RX ORDER — HEPARIN SODIUM (PORCINE) LOCK FLUSH IV SOLN 100 UNIT/ML 100 UNIT/ML
5 SOLUTION INTRAVENOUS ONCE
Status: CANCELLED | OUTPATIENT
Start: 2020-09-15

## 2020-09-15 RX ORDER — 0.9 % SODIUM CHLORIDE 0.9 %
10 VIAL (ML) INJECTION ONCE
Status: CANCELLED | OUTPATIENT
Start: 2020-09-15

## 2020-09-15 RX ADMIN — HEPARIN SODIUM (PORCINE) LOCK FLUSH IV SOLN 100 UNIT/ML 500 UNITS: 100 SOLUTION INTRAVENOUS at 13:41:00

## 2020-09-22 ENCOUNTER — OFFICE VISIT (OUTPATIENT)
Dept: HEMATOLOGY/ONCOLOGY | Facility: HOSPITAL | Age: 60
End: 2020-09-22
Attending: INTERNAL MEDICINE
Payer: MEDICARE

## 2020-09-22 VITALS
WEIGHT: 203 LBS | HEART RATE: 83 BPM | HEIGHT: 64 IN | SYSTOLIC BLOOD PRESSURE: 127 MMHG | TEMPERATURE: 99 F | OXYGEN SATURATION: 95 % | RESPIRATION RATE: 18 BRPM | DIASTOLIC BLOOD PRESSURE: 75 MMHG | BODY MASS INDEX: 34.66 KG/M2

## 2020-09-22 DIAGNOSIS — C48.2 PRIMARY PERITONEAL CARCINOMATOSIS (HCC): Primary | ICD-10-CM

## 2020-09-22 DIAGNOSIS — Z51.11 ENCOUNTER FOR CHEMOTHERAPY MANAGEMENT: ICD-10-CM

## 2020-09-22 DIAGNOSIS — M25.562 ARTHRALGIA OF BOTH KNEES: ICD-10-CM

## 2020-09-22 DIAGNOSIS — M25.561 ARTHRALGIA OF BOTH KNEES: ICD-10-CM

## 2020-09-22 DIAGNOSIS — K55.069 SUPERIOR MESENTERIC VEIN THROMBOSIS (HCC): ICD-10-CM

## 2020-09-22 PROCEDURE — 99215 OFFICE O/P EST HI 40 MIN: CPT | Performed by: INTERNAL MEDICINE

## 2020-09-22 NOTE — PROGRESS NOTES
Cancer Center Progress Note    Patient Name: Chandrakant Hdez   YOB: 1960   Medical Record Number: [id number]   Attending Physician: Tula Severs, M.D.      Chief Complaint:  F/u primary peritoneal carcinoma    History of Present Illness:  Aysha months of anticoagulation with rivaroxaban. Thrombus was resolved on subsequent imaging    Interval history:  She is returning for routine follow-up. She is doing well overall. She denies abdominal pain. She denies any bruising or bleeding.   She denie Marlene Gibson MD at [address]   • PARISH LOCALIZATION WIRE 1 SITE LEFT (CPT=19281)     •      • OTHER SURGICAL HISTORY  2015    Cytoreductive surgery with peritonectomy,   En bloc small bowel resection, partial colectomy, partial cystectomy, parti Quit date: 1988        Years since quittin.7      Smokeless tobacco: Never Used    Substance and Sexual Activity      Alcohol use: No      Drug use: No      Sexual activity: Not on file    Lifestyle      Physical activity        Days per week: tablet, Rfl: 1  •  atorvastatin 40 MG Oral Tab, TAKE 1 TABLET BY MOUTH NIGHTLY, Disp: 90 tablet, Rfl: 2  •  prednisoLONE 5 MG Oral Tab, Take 5 mg by mouth daily. 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 Mary Hall and is s/p 6 cycles of carbo/taxol (completed 8/2014) per recommendation of [facility].   --A CT on 1/15/15 revealed new omental/mesenteric dakota this time. –History of hypomag. Recheck at follow-up this is likely secondary to her chronic diarrhea    The patient's emotional well being was assessed and resources were discussed.   Appropriate resources were reviewed and discussed with the pateint and

## 2020-10-01 ENCOUNTER — TELEPHONE (OUTPATIENT)
Dept: SURGERY | Facility: CLINIC | Age: 60
End: 2020-10-01

## 2020-10-01 NOTE — TELEPHONE ENCOUNTER
Ref by Dr Ramiro Avina; called to schedule consult; lm on vm to cb. Both my direct # and main dept # given.

## 2020-10-02 ENCOUNTER — TELEPHONE (OUTPATIENT)
Dept: SURGERY | Facility: CLINIC | Age: 60
End: 2020-10-02

## 2020-10-02 NOTE — TELEPHONE ENCOUNTER
I called patient to schedule her to see Dr. Jerald Valente. She was ref by Dr. Twin Ni.  Schedule appt 10/07/20 2:00pm

## 2020-10-05 RX ORDER — ATORVASTATIN CALCIUM 40 MG/1
TABLET, FILM COATED ORAL
Qty: 90 TABLET | Refills: 1 | Status: SHIPPED | OUTPATIENT
Start: 2020-10-05 | End: 2021-04-28

## 2020-10-05 NOTE — TELEPHONE ENCOUNTER
LOV: 02/26/20  LR: 03/17/20    Future Appointments   Date Time Provider Tamiko Ramachandran   10/29/2020  4:45 PM Hugo Alcantara MD [address] [place] [address]

## 2020-10-06 ENCOUNTER — TELEPHONE (OUTPATIENT)
Dept: SURGERY | Facility: CLINIC | Age: 60
End: 2020-10-06

## 2020-10-07 ENCOUNTER — OFFICE VISIT (OUTPATIENT)
Dept: SURGERY | Facility: CLINIC | Age: 60
End: 2020-10-07
Payer: MEDICARE

## 2020-10-07 VITALS
SYSTOLIC BLOOD PRESSURE: 125 MMHG | OXYGEN SATURATION: 96 % | WEIGHT: 204.63 LBS | DIASTOLIC BLOOD PRESSURE: 70 MMHG | BODY MASS INDEX: 34.94 KG/M2 | HEIGHT: 64 IN | HEART RATE: 78 BPM

## 2020-10-07 DIAGNOSIS — C78.6 PERITONEAL CARCINOMATOSIS (HCC): Primary | ICD-10-CM

## 2020-10-07 PROCEDURE — 99205 OFFICE O/P NEW HI 60 MIN: CPT | Performed by: SURGERY

## 2020-10-09 NOTE — CONSULTS
[facility]    Patient Name:  Belkys Ford   YOB: 1960   Gender:  Female   Appt Date:  10/7/2020   Provider:  Sandie Lim MD   Insurance:  [address] [place] [address] Patient originally underwent OSIEL/BSO in 1999 for fibroids. Pathology showed right ovary with papillary serous cystadenoma. She remained well until 2013 when she presented with abdominal pain.  She was taken to the OR by Dr. Thuy Matias in 12/2013 for laparoscop dependent pelvic lesion measuring 2.4 x 1.1 cm corresponds to a remote probably fat containing 2.9 x 1.3 cm lesion and either represents jules fat necrosis or a partially treated metastatic lesion.      Patient presents to the clinic to discuss surgic Adhesive Tape           ITCHING    Comment:PLEASE USE MEPORE DRESSING AND A 3M STERI STRIP TO             SECURE PORT. Pt itches, might rash with tegaderm             and strips inside port kit.      History:  Reviewed:  Past Medical History:   Diagnosis Ronni • OTHER SURGICAL HISTORY  07/21/2015    Ileostomy takedown,   Left subclavian vein single-lumen port placement with fluoroscopic guidance.    • PERITONECTOMY WITH HYPERTHEMIC INTRAPERITONEAL CHEMOTHERAPY (HIPEC) N/A 3/31/2015    Performed by Caden Manzano, Neurological: Negative for dizziness, light-headedness, numbness and headaches. Physical Examination:  Physical Exam    Constitutional: She is oriented to person, place, and time. She appears well-developed and well-nourished. No distress.    HENT: -Portion of fibroadipose tissue with metastatic papillary serous   carcinoma.      B- Abdominal wall, portion of greater omentum, portion of right colon,   small bowel and portion of bladder:   -Multifocal, metastatic papillary serous carcinoma involving om -Mesenteric adipose tissue, positive for metastatic papillary serous   carcinoma.      M- Remainder of sigmoid colon:   -Segment of colon (5 cm in length) with multiple serosal nodules of   metastatic papillary serous carcinoma.   -Single lymph node with me LYMPHADENOPATHY:  Borderline enlarged portacaval space lymph node measuring 17 x 18 mm on image 92 series 2 is unchanged.   Top normal caliber portal caval space lymph node Subcentimeter retroperitoneal lymph nodes with the largest in the aortocaval space 1. 32 x 15 x 22 mm omental tumor nodule abutting capsular margin of left lobe of liver previously measured 18 x 11 x 17 mm.  2. Stable bilateral lower quadrant rim calcified fat containing lesions probably reflect fat necrosis or treated lesions.   3. A per The patient also describes biliary colic and has cholelithiasis evident on her CT scan. I explained that I would recommend cholecystectomy at the same time.   The patient seemed hesitant to proceed with surgery and would like to have additional time to [facility]

## 2020-10-09 NOTE — H&P (VIEW-ONLY)
[facility]    Patient Name:  Valencia Virk   YOB: 1960   Gender:  Female   Appt Date:  10/7/2020   Provider:  Beatrice Wallace MD   Insurance:  [address] [place] [address] Patient originally underwent OSIEL/BSO in 1999 for fibroids. Pathology showed right ovary with papillary serous cystadenoma. She remained well until 2013 when she presented with abdominal pain.  She was taken to the OR by Dr. Chucho Resendiz in 12/2013 for laparoscop dependent pelvic lesion measuring 2.4 x 1.1 cm corresponds to a remote probably fat containing 2.9 x 1.3 cm lesion and either represents jules fat necrosis or a partially treated metastatic lesion.      Patient presents to the clinic to discuss surgic Adhesive Tape           ITCHING    Comment:PLEASE USE MEPORE DRESSING AND A 3M STERI STRIP TO             SECURE PORT. Pt itches, might rash with tegaderm             and strips inside port kit.      History:  Reviewed:  Past Medical History:   Diagnosis Ronni • OTHER SURGICAL HISTORY  07/21/2015    Ileostomy takedown,   Left subclavian vein single-lumen port placement with fluoroscopic guidance.    • PERITONECTOMY WITH HYPERTHEMIC INTRAPERITONEAL CHEMOTHERAPY (HIPEC) N/A 3/31/2015    Performed by Caden Manzano, Neurological: Negative for dizziness, light-headedness, numbness and headaches. Physical Examination:  Physical Exam    Constitutional: She is oriented to person, place, and time. She appears well-developed and well-nourished. No distress.    HENT: -Portion of fibroadipose tissue with metastatic papillary serous   carcinoma.      B- Abdominal wall, portion of greater omentum, portion of right colon,   small bowel and portion of bladder:   -Multifocal, metastatic papillary serous carcinoma involving om -Mesenteric adipose tissue, positive for metastatic papillary serous   carcinoma.      M- Remainder of sigmoid colon:   -Segment of colon (5 cm in length) with multiple serosal nodules of   metastatic papillary serous carcinoma.   -Single lymph node with me LYMPHADENOPATHY:  Borderline enlarged portacaval space lymph node measuring 17 x 18 mm on image 92 series 2 is unchanged.   Top normal caliber portal caval space lymph node Subcentimeter retroperitoneal lymph nodes with the largest in the aortocaval space 1. 32 x 15 x 22 mm omental tumor nodule abutting capsular margin of left lobe of liver previously measured 18 x 11 x 17 mm.  2. Stable bilateral lower quadrant rim calcified fat containing lesions probably reflect fat necrosis or treated lesions.   3. A per The patient also describes biliary colic and has cholelithiasis evident on her CT scan. I explained that I would recommend cholecystectomy at the same time.   The patient seemed hesitant to proceed with surgery and would like to have additional time to [facility]

## 2020-10-13 ENCOUNTER — TELEPHONE (OUTPATIENT)
Dept: HEMATOLOGY/ONCOLOGY | Facility: HOSPITAL | Age: 60
End: 2020-10-13

## 2020-10-13 NOTE — TELEPHONE ENCOUNTER
Genevieve Francis called and is requesting Dr Joon Aguilera place order for CT scan asap that needs to be done prior to surgery of which she is planning for late Dec or early Jan.  Please call when order placed.

## 2020-10-19 ENCOUNTER — PATIENT MESSAGE (OUTPATIENT)
Dept: ENDOCRINOLOGY CLINIC | Facility: CLINIC | Age: 60
End: 2020-10-19

## 2020-10-19 ENCOUNTER — TELEPHONE (OUTPATIENT)
Dept: SURGERY | Facility: CLINIC | Age: 60
End: 2020-10-19

## 2020-10-19 DIAGNOSIS — C48.2 PAPILLARY SEROUS CARCINOMA OF PERITONEUM (HCC): Primary | ICD-10-CM

## 2020-10-19 DIAGNOSIS — C78.6 PERITONEAL CARCINOMATOSIS (HCC): ICD-10-CM

## 2020-10-19 DIAGNOSIS — E11.65 UNCONTROLLED TYPE 2 DIABETES MELLITUS WITH HYPERGLYCEMIA (HCC): Primary | ICD-10-CM

## 2020-10-19 NOTE — TELEPHONE ENCOUNTER
Patient called. She would like to pursue surgery within the next few weeks. We will plan on exploratory laparotomy, omental tumor excision, possible partial liver resection, cholecystectomy.  Risks of the procedure were explained to the patient in detail in

## 2020-10-20 NOTE — TELEPHONE ENCOUNTER
From: Luis Mock  To: Ace Mccrary MD  Sent: 10/19/2020 10:34 PM CDT  Subject: Visit Rubi Khan,  I have a video appointment with you Thursday October 29th.   I am scheduled for, Exp Lap, Exc  omental tumor, poss Liver Resecti

## 2020-10-20 NOTE — TELEPHONE ENCOUNTER
Received [employer] from [place]:    Message below. Patient requesting endocrinology clearance for her surgery scheduled on 11/2/20. She has an upcoming video appointment on 10/29/20.

## 2020-10-21 ENCOUNTER — ANESTHESIA EVENT (OUTPATIENT)
Dept: SURGERY | Facility: HOSPITAL | Age: 60
DRG: 357 | End: 2020-10-21
Payer: MEDICARE

## 2020-10-28 ENCOUNTER — NURSE ONLY (OUTPATIENT)
Dept: HEMATOLOGY/ONCOLOGY | Facility: HOSPITAL | Age: 60
End: 2020-10-28
Attending: INTERNAL MEDICINE
Payer: MEDICARE

## 2020-10-28 DIAGNOSIS — C78.6 PERITONEAL CARCINOMATOSIS (HCC): ICD-10-CM

## 2020-10-28 DIAGNOSIS — Z45.2 ENCOUNTER FOR ADJUSTMENT OR MANAGEMENT OF VASCULAR ACCESS DEVICE: ICD-10-CM

## 2020-10-28 DIAGNOSIS — C48.2 PAPILLARY SEROUS CARCINOMA OF PERITONEUM (HCC): Primary | ICD-10-CM

## 2020-10-28 DIAGNOSIS — C48.2 MALIGNANT NEOPLASM OF PERITONEUM (HCC): ICD-10-CM

## 2020-10-28 DIAGNOSIS — E11.65 UNCONTROLLED TYPE 2 DIABETES MELLITUS WITH HYPERGLYCEMIA (HCC): ICD-10-CM

## 2020-10-28 PROCEDURE — 80053 COMPREHEN METABOLIC PANEL: CPT

## 2020-10-28 PROCEDURE — 85027 COMPLETE CBC AUTOMATED: CPT

## 2020-10-28 PROCEDURE — 36591 DRAW BLOOD OFF VENOUS DEVICE: CPT

## 2020-10-28 PROCEDURE — 83036 HEMOGLOBIN GLYCOSYLATED A1C: CPT

## 2020-10-28 PROCEDURE — 86900 BLOOD TYPING SEROLOGIC ABO: CPT

## 2020-10-28 PROCEDURE — 86901 BLOOD TYPING SEROLOGIC RH(D): CPT

## 2020-10-28 PROCEDURE — 86850 RBC ANTIBODY SCREEN: CPT

## 2020-10-28 RX ORDER — SODIUM CHLORIDE 9 MG/ML
10 INJECTION INTRAVENOUS ONCE
Status: CANCELLED | OUTPATIENT
Start: 2020-10-28

## 2020-10-28 RX ORDER — HEPARIN SODIUM (PORCINE) LOCK FLUSH IV SOLN 100 UNIT/ML 100 UNIT/ML
5 SOLUTION INTRAVENOUS ONCE
Status: COMPLETED | OUTPATIENT
Start: 2020-10-28 | End: 2020-10-28

## 2020-10-28 RX ORDER — METOCLOPRAMIDE 10 MG/1
10 TABLET ORAL ONCE
Status: CANCELLED | OUTPATIENT
Start: 2020-10-28 | End: 2020-10-28

## 2020-10-28 RX ORDER — SODIUM CHLORIDE 9 MG/ML
INJECTION INTRAVENOUS
Status: DISPENSED
Start: 2020-10-28 | End: 2020-10-29

## 2020-10-28 RX ORDER — HEPARIN SODIUM (PORCINE) LOCK FLUSH IV SOLN 100 UNIT/ML 100 UNIT/ML
5 SOLUTION INTRAVENOUS ONCE
Status: CANCELLED | OUTPATIENT
Start: 2020-10-28

## 2020-10-28 RX ADMIN — HEPARIN SODIUM (PORCINE) LOCK FLUSH IV SOLN 100 UNIT/ML 500 UNITS: 100 SOLUTION INTRAVENOUS at 13:55:00

## 2020-10-29 ENCOUNTER — TELEMEDICINE (OUTPATIENT)
Dept: ENDOCRINOLOGY CLINIC | Facility: CLINIC | Age: 60
End: 2020-10-29

## 2020-10-29 DIAGNOSIS — E11.65 UNCONTROLLED TYPE 2 DIABETES MELLITUS WITH COMPLICATION, WITH LONG-TERM CURRENT USE OF INSULIN (HCC): Primary | ICD-10-CM

## 2020-10-29 DIAGNOSIS — Z79.4 UNCONTROLLED TYPE 2 DIABETES MELLITUS WITH COMPLICATION, WITH LONG-TERM CURRENT USE OF INSULIN (HCC): Primary | ICD-10-CM

## 2020-10-29 DIAGNOSIS — E11.8 UNCONTROLLED TYPE 2 DIABETES MELLITUS WITH COMPLICATION, WITH LONG-TERM CURRENT USE OF INSULIN (HCC): Primary | ICD-10-CM

## 2020-10-29 PROCEDURE — 99213 OFFICE O/P EST LOW 20 MIN: CPT | Performed by: INTERNAL MEDICINE

## 2020-10-29 NOTE — PROGRESS NOTES
Name: Barbie Godinez  Date: 10/29/2020    Referring Physician: No ref. provider found    HISTORY OF PRESENT ILLNESS   Barbie Godinez is a 63year old female who presents for diabetes mellitus.   She is now maintained on letrozole for ovarian metastatic CA each, Rfl: 1  •  LETROZOLE 2.5 MG Oral Tab, TAKE 1 TABLET BY MOUTH EVERY DAY, Disp: 90 tablet, Rfl: 1  •  Insulin Lispro (HUMALOG) 100 UNIT/ML Subcutaneous Solution, 200 units via insulin pump, Disp: 180 mL, Rfl: 1  •  CITALOPRAM HYDROBROMIDE 10 MG Oral Ta Management:  OSIEL/BSO; was on HET x5 years and stopped   • High blood pressure    • High cholesterol    • Injury of right foot 2001    Soft tissue injury, right foot   • Neuropathy     Bilateral feet   • Osteoarthritis     feet, ankles, and shoulders   • Ov acute distress  Eyes:  normal conjunctivae, sclera. , normal sclera and normal pupils  Throat/Neck: normal sound to voice. Back: no kyphosis  Respiratory:  non-labored. no increased work of breathing.     Lymph Nodes:  No abnormal nodes noted  Skin:  jian

## 2020-10-30 ENCOUNTER — LAB ENCOUNTER (OUTPATIENT)
Dept: LAB | Age: 60
DRG: 357 | End: 2020-10-30
Attending: SURGERY
Payer: MEDICARE

## 2020-10-30 DIAGNOSIS — Z01.818 PREOP TESTING: ICD-10-CM

## 2020-10-30 PROCEDURE — 87641 MR-STAPH DNA AMP PROBE: CPT

## 2020-11-02 ENCOUNTER — ANESTHESIA (OUTPATIENT)
Dept: SURGERY | Facility: HOSPITAL | Age: 60
DRG: 357 | End: 2020-11-02
Payer: MEDICARE

## 2020-11-02 ENCOUNTER — HOSPITAL ENCOUNTER (INPATIENT)
Facility: HOSPITAL | Age: 60
LOS: 5 days | Discharge: HOME HEALTH | DRG: 357 | End: 2020-11-07
Attending: SURGERY | Admitting: SURGERY
Payer: MEDICARE

## 2020-11-02 DIAGNOSIS — I10 ESSENTIAL HYPERTENSION: ICD-10-CM

## 2020-11-02 DIAGNOSIS — C78.6 PERITONEAL CARCINOMATOSIS (HCC): ICD-10-CM

## 2020-11-02 DIAGNOSIS — C48.2 PAPILLARY SEROUS CARCINOMA OF PERITONEUM (HCC): ICD-10-CM

## 2020-11-02 DIAGNOSIS — Z01.818 PREOP TESTING: Primary | ICD-10-CM

## 2020-11-02 PROBLEM — C78.7 LIVER METASTASES (HCC): Status: ACTIVE | Noted: 2020-11-02

## 2020-11-02 PROBLEM — C78.7 LIVER METASTASES: Status: ACTIVE | Noted: 2020-11-02

## 2020-11-02 PROCEDURE — 0FT40ZZ RESECTION OF GALLBLADDER, OPEN APPROACH: ICD-10-PCS | Performed by: SURGERY

## 2020-11-02 PROCEDURE — 76942 ECHO GUIDE FOR BIOPSY: CPT | Performed by: ANESTHESIOLOGY

## 2020-11-02 PROCEDURE — 0FB00ZZ EXCISION OF LIVER, OPEN APPROACH: ICD-10-PCS | Performed by: SURGERY

## 2020-11-02 PROCEDURE — 99233 SBSQ HOSP IP/OBS HIGH 50: CPT | Performed by: HOSPITALIST

## 2020-11-02 DEVICE — SEPRAFILM ADHESION BARRIER (MEMBRANE) IS A STERILE, BIORESORBABLE, TRANSLUCENT ADHESION BARRIER COMPOSED OF TWO ANIONIC POLYSACCHARIDES, SODIUM HYALURONATE (HA) AND CARBOXYMETHYLCELLULOSE (CMC).
Type: IMPLANTABLE DEVICE | Site: ABDOMEN | Status: FUNCTIONAL
Brand: SEPRAFILM

## 2020-11-02 RX ORDER — SODIUM CHLORIDE, SODIUM LACTATE, POTASSIUM CHLORIDE, CALCIUM CHLORIDE 600; 310; 30; 20 MG/100ML; MG/100ML; MG/100ML; MG/100ML
INJECTION, SOLUTION INTRAVENOUS CONTINUOUS
Status: DISCONTINUED | OUTPATIENT
Start: 2020-11-02 | End: 2020-11-06

## 2020-11-02 RX ORDER — MORPHINE SULFATE 15 MG/1
15 TABLET ORAL EVERY 4 HOURS PRN
Status: ACTIVE | OUTPATIENT
Start: 2020-11-02 | End: 2020-11-03

## 2020-11-02 RX ORDER — DEXAMETHASONE SODIUM PHOSPHATE 4 MG/ML
VIAL (ML) INJECTION AS NEEDED
Status: DISCONTINUED | OUTPATIENT
Start: 2020-11-02 | End: 2020-11-02 | Stop reason: SURG

## 2020-11-02 RX ORDER — CARVEDILOL 12.5 MG/1
12.5 TABLET ORAL 2 TIMES DAILY WITH MEALS
Status: DISCONTINUED | OUTPATIENT
Start: 2020-11-02 | End: 2020-11-04

## 2020-11-02 RX ORDER — HYDROMORPHONE HYDROCHLORIDE 1 MG/ML
0.6 INJECTION, SOLUTION INTRAMUSCULAR; INTRAVENOUS; SUBCUTANEOUS EVERY 5 MIN PRN
Status: DISCONTINUED | OUTPATIENT
Start: 2020-11-02 | End: 2020-11-02 | Stop reason: HOSPADM

## 2020-11-02 RX ORDER — METOPROLOL TARTRATE 5 MG/5ML
2.5 INJECTION INTRAVENOUS ONCE
Status: DISCONTINUED | OUTPATIENT
Start: 2020-11-02 | End: 2020-11-02 | Stop reason: HOSPADM

## 2020-11-02 RX ORDER — MORPHINE SULFATE 4 MG/ML
2 INJECTION, SOLUTION INTRAMUSCULAR; INTRAVENOUS EVERY 10 MIN PRN
Status: DISCONTINUED | OUTPATIENT
Start: 2020-11-02 | End: 2020-11-02 | Stop reason: HOSPADM

## 2020-11-02 RX ORDER — OXYCODONE HYDROCHLORIDE 5 MG/1
10 TABLET ORAL EVERY 4 HOURS PRN
Status: ACTIVE | OUTPATIENT
Start: 2020-11-02 | End: 2020-11-03

## 2020-11-02 RX ORDER — DEXTROSE MONOHYDRATE 25 G/50ML
50 INJECTION, SOLUTION INTRAVENOUS
Status: DISCONTINUED | OUTPATIENT
Start: 2020-11-02 | End: 2020-11-07

## 2020-11-02 RX ORDER — ONDANSETRON 2 MG/ML
INJECTION INTRAMUSCULAR; INTRAVENOUS AS NEEDED
Status: DISCONTINUED | OUTPATIENT
Start: 2020-11-02 | End: 2020-11-02 | Stop reason: SURG

## 2020-11-02 RX ORDER — NEOSTIGMINE METHYLSULFATE 1 MG/ML
INJECTION INTRAVENOUS AS NEEDED
Status: DISCONTINUED | OUTPATIENT
Start: 2020-11-02 | End: 2020-11-02 | Stop reason: SURG

## 2020-11-02 RX ORDER — MORPHINE SULFATE 4 MG/ML
4 INJECTION, SOLUTION INTRAMUSCULAR; INTRAVENOUS EVERY 10 MIN PRN
Status: DISCONTINUED | OUTPATIENT
Start: 2020-11-02 | End: 2020-11-02 | Stop reason: HOSPADM

## 2020-11-02 RX ORDER — HYDROCODONE BITARTRATE AND ACETAMINOPHEN 5; 325 MG/1; MG/1
1 TABLET ORAL AS NEEDED
Status: DISCONTINUED | OUTPATIENT
Start: 2020-11-02 | End: 2020-11-02 | Stop reason: HOSPADM

## 2020-11-02 RX ORDER — DEXTROSE MONOHYDRATE 25 G/50ML
50 INJECTION, SOLUTION INTRAVENOUS
Status: DISCONTINUED | OUTPATIENT
Start: 2020-11-02 | End: 2020-11-02 | Stop reason: HOSPADM

## 2020-11-02 RX ORDER — MIDAZOLAM HYDROCHLORIDE 1 MG/ML
INJECTION INTRAMUSCULAR; INTRAVENOUS AS NEEDED
Status: DISCONTINUED | OUTPATIENT
Start: 2020-11-02 | End: 2020-11-02 | Stop reason: SURG

## 2020-11-02 RX ORDER — ONDANSETRON 2 MG/ML
4 INJECTION INTRAMUSCULAR; INTRAVENOUS EVERY 6 HOURS PRN
Status: DISCONTINUED | OUTPATIENT
Start: 2020-11-02 | End: 2020-11-07

## 2020-11-02 RX ORDER — OXYCODONE HYDROCHLORIDE 5 MG/1
5 TABLET ORAL EVERY 4 HOURS PRN
Status: ACTIVE | OUTPATIENT
Start: 2020-11-02 | End: 2020-11-03

## 2020-11-02 RX ORDER — SODIUM CHLORIDE, SODIUM LACTATE, POTASSIUM CHLORIDE, CALCIUM CHLORIDE 600; 310; 30; 20 MG/100ML; MG/100ML; MG/100ML; MG/100ML
INJECTION, SOLUTION INTRAVENOUS CONTINUOUS
Status: DISCONTINUED | OUTPATIENT
Start: 2020-11-02 | End: 2020-11-03

## 2020-11-02 RX ORDER — HEPARIN SODIUM 5000 [USP'U]/ML
5000 INJECTION, SOLUTION INTRAVENOUS; SUBCUTANEOUS ONCE
Status: COMPLETED | OUTPATIENT
Start: 2020-11-02 | End: 2020-11-02

## 2020-11-02 RX ORDER — SODIUM CHLORIDE 9 MG/ML
INJECTION, SOLUTION INTRAVENOUS CONTINUOUS PRN
Status: DISCONTINUED | OUTPATIENT
Start: 2020-11-02 | End: 2020-11-02 | Stop reason: SURG

## 2020-11-02 RX ORDER — ENOXAPARIN SODIUM 100 MG/ML
40 INJECTION SUBCUTANEOUS DAILY
Status: DISCONTINUED | OUTPATIENT
Start: 2020-11-03 | End: 2020-11-07

## 2020-11-02 RX ORDER — HYDROCODONE BITARTRATE AND ACETAMINOPHEN 5; 325 MG/1; MG/1
2 TABLET ORAL AS NEEDED
Status: DISCONTINUED | OUTPATIENT
Start: 2020-11-02 | End: 2020-11-02 | Stop reason: HOSPADM

## 2020-11-02 RX ORDER — GLYCOPYRROLATE 0.2 MG/ML
INJECTION, SOLUTION INTRAMUSCULAR; INTRAVENOUS AS NEEDED
Status: DISCONTINUED | OUTPATIENT
Start: 2020-11-02 | End: 2020-11-02 | Stop reason: SURG

## 2020-11-02 RX ORDER — HYDROMORPHONE HYDROCHLORIDE 1 MG/ML
0.2 INJECTION, SOLUTION INTRAMUSCULAR; INTRAVENOUS; SUBCUTANEOUS EVERY 5 MIN PRN
Status: DISCONTINUED | OUTPATIENT
Start: 2020-11-02 | End: 2020-11-02 | Stop reason: HOSPADM

## 2020-11-02 RX ORDER — ACETAMINOPHEN 500 MG
1000 TABLET ORAL ONCE
Status: COMPLETED | OUTPATIENT
Start: 2020-11-02 | End: 2020-11-02

## 2020-11-02 RX ORDER — MORPHINE SULFATE 2 MG/ML
2 INJECTION, SOLUTION INTRAMUSCULAR; INTRAVENOUS EVERY 2 HOUR PRN
Status: ACTIVE | OUTPATIENT
Start: 2020-11-02 | End: 2020-11-03

## 2020-11-02 RX ORDER — LIDOCAINE HYDROCHLORIDE 40 MG/ML
SOLUTION TOPICAL AS NEEDED
Status: DISCONTINUED | OUTPATIENT
Start: 2020-11-02 | End: 2020-11-02 | Stop reason: SURG

## 2020-11-02 RX ORDER — LABETALOL HYDROCHLORIDE 5 MG/ML
10 INJECTION, SOLUTION INTRAVENOUS ONCE
Status: COMPLETED | OUTPATIENT
Start: 2020-11-02 | End: 2020-11-02

## 2020-11-02 RX ORDER — HALOPERIDOL 5 MG/ML
0.25 INJECTION INTRAMUSCULAR ONCE AS NEEDED
Status: DISCONTINUED | OUTPATIENT
Start: 2020-11-02 | End: 2020-11-02 | Stop reason: HOSPADM

## 2020-11-02 RX ORDER — MORPHINE SULFATE 4 MG/ML
4 INJECTION, SOLUTION INTRAMUSCULAR; INTRAVENOUS EVERY 2 HOUR PRN
Status: ACTIVE | OUTPATIENT
Start: 2020-11-02 | End: 2020-11-03

## 2020-11-02 RX ORDER — ACETAMINOPHEN 10 MG/ML
INJECTION, SOLUTION INTRAVENOUS
Status: COMPLETED
Start: 2020-11-02 | End: 2020-11-02

## 2020-11-02 RX ORDER — HYDROMORPHONE HYDROCHLORIDE 1 MG/ML
0.4 INJECTION, SOLUTION INTRAMUSCULAR; INTRAVENOUS; SUBCUTANEOUS EVERY 5 MIN PRN
Status: DISCONTINUED | OUTPATIENT
Start: 2020-11-02 | End: 2020-11-02 | Stop reason: HOSPADM

## 2020-11-02 RX ORDER — NALOXONE HYDROCHLORIDE 0.4 MG/ML
80 INJECTION, SOLUTION INTRAMUSCULAR; INTRAVENOUS; SUBCUTANEOUS AS NEEDED
Status: DISCONTINUED | OUTPATIENT
Start: 2020-11-02 | End: 2020-11-02 | Stop reason: HOSPADM

## 2020-11-02 RX ORDER — MORPHINE SULFATE 4 MG/ML
6 INJECTION, SOLUTION INTRAMUSCULAR; INTRAVENOUS EVERY 2 HOUR PRN
Status: ACTIVE | OUTPATIENT
Start: 2020-11-02 | End: 2020-11-03

## 2020-11-02 RX ORDER — FAMOTIDINE 20 MG/1
20 TABLET ORAL ONCE
Status: COMPLETED | OUTPATIENT
Start: 2020-11-02 | End: 2020-11-02

## 2020-11-02 RX ORDER — ESCITALOPRAM OXALATE 10 MG/1
10 TABLET ORAL DAILY
Status: DISCONTINUED | OUTPATIENT
Start: 2020-11-03 | End: 2020-11-07

## 2020-11-02 RX ORDER — ONDANSETRON 2 MG/ML
4 INJECTION INTRAMUSCULAR; INTRAVENOUS ONCE AS NEEDED
Status: DISCONTINUED | OUTPATIENT
Start: 2020-11-02 | End: 2020-11-02 | Stop reason: HOSPADM

## 2020-11-02 RX ORDER — VECURONIUM BROMIDE 1 MG/ML
INJECTION, POWDER, LYOPHILIZED, FOR SOLUTION INTRAVENOUS AS NEEDED
Status: DISCONTINUED | OUTPATIENT
Start: 2020-11-02 | End: 2020-11-02 | Stop reason: SURG

## 2020-11-02 RX ORDER — ACETAMINOPHEN 10 MG/ML
1000 INJECTION, SOLUTION INTRAVENOUS EVERY 6 HOURS
Status: DISCONTINUED | OUTPATIENT
Start: 2020-11-02 | End: 2020-11-04

## 2020-11-02 RX ORDER — PROCHLORPERAZINE EDISYLATE 5 MG/ML
5 INJECTION INTRAMUSCULAR; INTRAVENOUS ONCE AS NEEDED
Status: DISCONTINUED | OUTPATIENT
Start: 2020-11-02 | End: 2020-11-02 | Stop reason: HOSPADM

## 2020-11-02 RX ORDER — MORPHINE SULFATE 10 MG/ML
6 INJECTION, SOLUTION INTRAMUSCULAR; INTRAVENOUS EVERY 10 MIN PRN
Status: DISCONTINUED | OUTPATIENT
Start: 2020-11-02 | End: 2020-11-02 | Stop reason: HOSPADM

## 2020-11-02 RX ORDER — LETROZOLE 2.5 MG/1
2.5 TABLET, FILM COATED ORAL DAILY
Status: DISCONTINUED | OUTPATIENT
Start: 2020-11-03 | End: 2020-11-03

## 2020-11-02 RX ORDER — ROPIVACAINE HYDROCHLORIDE 5 MG/ML
INJECTION, SOLUTION EPIDURAL; INFILTRATION; PERINEURAL
Status: COMPLETED | OUTPATIENT
Start: 2020-11-02 | End: 2020-11-02

## 2020-11-02 RX ORDER — SODIUM CHLORIDE, SODIUM LACTATE, POTASSIUM CHLORIDE, CALCIUM CHLORIDE 600; 310; 30; 20 MG/100ML; MG/100ML; MG/100ML; MG/100ML
INJECTION, SOLUTION INTRAVENOUS CONTINUOUS
Status: DISCONTINUED | OUTPATIENT
Start: 2020-11-02 | End: 2020-11-02 | Stop reason: HOSPADM

## 2020-11-02 RX ADMIN — NEOSTIGMINE METHYLSULFATE 5 MG: 1 INJECTION INTRAVENOUS at 14:10:00

## 2020-11-02 RX ADMIN — ONDANSETRON 4 MG: 2 INJECTION INTRAMUSCULAR; INTRAVENOUS at 11:05:00

## 2020-11-02 RX ADMIN — VECURONIUM BROMIDE 10 MG: 1 INJECTION, POWDER, LYOPHILIZED, FOR SOLUTION INTRAVENOUS at 11:33:00

## 2020-11-02 RX ADMIN — MIDAZOLAM HYDROCHLORIDE 2 MG: 1 INJECTION INTRAMUSCULAR; INTRAVENOUS at 11:04:00

## 2020-11-02 RX ADMIN — VECURONIUM BROMIDE 1 MG: 1 INJECTION, POWDER, LYOPHILIZED, FOR SOLUTION INTRAVENOUS at 13:00:00

## 2020-11-02 RX ADMIN — ROPIVACAINE HYDROCHLORIDE 40 ML: 5 INJECTION, SOLUTION EPIDURAL; INFILTRATION; PERINEURAL at 14:26:00

## 2020-11-02 RX ADMIN — LIDOCAINE HYDROCHLORIDE 4 ML: 40 SOLUTION TOPICAL at 11:04:00

## 2020-11-02 RX ADMIN — SODIUM CHLORIDE: 9 INJECTION, SOLUTION INTRAVENOUS at 11:04:00

## 2020-11-02 RX ADMIN — DEXAMETHASONE SODIUM PHOSPHATE 4 MG: 4 MG/ML VIAL (ML) INJECTION at 11:05:00

## 2020-11-02 RX ADMIN — VECURONIUM BROMIDE 2 MG: 1 INJECTION, POWDER, LYOPHILIZED, FOR SOLUTION INTRAVENOUS at 12:25:00

## 2020-11-02 RX ADMIN — SODIUM CHLORIDE, SODIUM LACTATE, POTASSIUM CHLORIDE, CALCIUM CHLORIDE: 600; 310; 30; 20 INJECTION, SOLUTION INTRAVENOUS at 11:05:00

## 2020-11-02 RX ADMIN — GLYCOPYRROLATE 1 MG: 0.2 INJECTION, SOLUTION INTRAMUSCULAR; INTRAVENOUS at 14:10:00

## 2020-11-02 NOTE — ANESTHESIA POSTPROCEDURE EVALUATION
Patient: Vera Balderas    Procedure Summary     Date: 11/02/20 Room / Location: [address] [place] [address] MAIN OR 08 / [address] [place] [address] OR    Anesthesia Start: 1101 Anesthesia Stop: 4622    Procedures:       EXPLORATORY LAPAROTOMY (N/A )      LIVER RESECTION (N/A )      CHOLECYSTECTO

## 2020-11-02 NOTE — ANESTHESIA PROCEDURE NOTES
Peripheral Block    Date/Time: 11/2/2020 2:26 PM  Performed by: Olu Nicole MD  Authorized by: Olu Nicole MD       General Information and Staff    Start Time:  11/2/2020 2:22 PM  End Time:  11/2/2020 2:29 PM  Anesthesiologist:  Olu Nicole MD  P

## 2020-11-02 NOTE — ANESTHESIA PROCEDURE NOTES
Peripheral IV  Date/Time: 11/2/2020 11:11 AM  Inserted by: Gutierrez Croft MD    Placement  Needle size: 16 G  Laterality: left  Location: antecubital  Site prep: alcohol  Technique: anatomical landmarks  Attempts: 1

## 2020-11-02 NOTE — ANESTHESIA PROCEDURE NOTES
Peripheral IV  Date/Time: 11/2/2020 11:13 AM  Inserted by: Grecia Alarcon MD    Placement  Needle size: 16 G  Laterality: right  Location: hand  Site prep: alcohol  Technique: anatomical landmarks  Attempts: 1

## 2020-11-02 NOTE — ANESTHESIA PROCEDURE NOTES
Arterial Line  Performed by: Sherry Srinivasan MD  Authorized by: Sherry Srinivasan MD     General Information and Staff    Procedure Start:  11/2/2020 11:06 AM  Procedure End:  11/2/2020 11:09 AM  Anesthesiologist:  Sherry Srinivasan MD  Performed By:  José Luis Nuñez

## 2020-11-02 NOTE — PROGRESS NOTES
[facility] HOSP - [facility]    Progress Note    Adryan Plane Patient Status:  Inpatient    10/8/1960 MRN [id number]   Location [address] UNIT Attending Nicky Mcfarland MD   Hosp Day # 0 PCP DO Blue Mccain was taken to the OR by Dr. Fidelia Em in 12/2013 for laparoscopic appendectomy and small bowel resection. Peritoneal implants were noted, and final pathology showed ovarian invasive implants with pasammoma bodies.  She was seen by Dr. Sj Garcia and completed 6 cycle lesion. TODAY S/P exploratory laparatomy, partial hepatetectomy segment 3, lysis of adhesions, intra operative ultrasound, cholecystectomy, microwave ablation to potentiate margin.   PAIN CONTROL, MONITOR HEMODYNAMIC STATUS, DVT PROPHYLAXIS, PATHOLOGY P

## 2020-11-02 NOTE — INTERVAL H&P NOTE
Patient seen and examined. No changes to the attached history and physical are noted. 63year old female presenting today for planned ex lap and liver resection.     Phylicia Linda MD  [facility]  [place] [address]  Pager [phone]

## 2020-11-02 NOTE — ANESTHESIA PROCEDURE NOTES
Airway  Date/Time: 11/2/2020 11:06 AM  Urgency: Elective      General Information and Staff    Patient location during procedure: OR  Anesthesiologist: Alexi Zee MD  Performed: anesthesiologist     Indications and Patient Condition  Indications for ai

## 2020-11-02 NOTE — ANESTHESIA PREPROCEDURE EVALUATION
Anesthesia PreOp Note    HPI:     Amrit Castaneda is a 63year old female who presents for preoperative consultation requested by: Kinga Garces MD    Date of Surgery: 11/2/2020    Procedure(s):  EXPLORATORY LAPAROTOMY  LIVER RESECTION  LAPAROSCOPIC CHOL Essential hypertension         Date Noted: 09/30/2010        Past Medical History:   Diagnosis Date   • Anxiety 5 years    During Cancer Diagnosis   • Appendicitis    • Chronic diarrhea    • Depression 5years    Since cancer diagnosis   • Fibroids     Fib HYPERTHEMIC INTRAPERITONEAL CHEMOTHERAPY (HIPEC) N/A 3/31/2015    Performed by Sherri Mendoza MD at [facility] MAIN OR   • PORT, INDWELLING, IMP      Power port   • UPPER GI ENDOSCOPY - REFERRAL  2013         •  atorvastatin 40 MG Oral Tab, TAKE 1 TABLET BY MOUTH 5,000 Units, 5,000 Units, Subcutaneous, Once, Briana Candelario MD    •  cefOXitin Sodium (MEFOXIN) 2 g in sodium chloride 0.9% 100 mL MBP, 2 g, Intravenous, Once, Shar Yuen MD    •  Sodium Chloride 0.9 % solution 10 mL, 10 mL, Intravenous, PRN, Isidoro Ibanez Activity      Alcohol use: No      Drug use: No      Sexual activity: Not on file    Lifestyle      Physical activity        Days per week: Not on file        Minutes per session: Not on file      Stress: Not on file    Relationships      Social connection Anesthesia Evaluation     Patient summary reviewed and Nursing notes reviewed    Airway   Mallampati: II  TM distance: >3 FB  Neck ROM: full  Dental      Pulmonary - normal exam   (+) sleep apnea,   Cardiovascular - normal exam  (+) hypertension,     Neuro

## 2020-11-02 NOTE — BRIEF OP NOTE
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 2]Peritoneal carcinomatosis ([address] [C78.6, C80.

## 2020-11-03 PROCEDURE — 99233 SBSQ HOSP IP/OBS HIGH 50: CPT | Performed by: HOSPITALIST

## 2020-11-03 PROCEDURE — 99222 1ST HOSP IP/OBS MODERATE 55: CPT | Performed by: INTERNAL MEDICINE

## 2020-11-03 RX ORDER — DEXTROSE MONOHYDRATE 25 G/50ML
50 INJECTION, SOLUTION INTRAVENOUS
Status: DISCONTINUED | OUTPATIENT
Start: 2020-11-03 | End: 2020-11-03

## 2020-11-03 RX ORDER — DEXTROSE, SODIUM CHLORIDE, AND POTASSIUM CHLORIDE 5; .45; .15 G/100ML; G/100ML; G/100ML
INJECTION INTRAVENOUS CONTINUOUS
Status: DISCONTINUED | OUTPATIENT
Start: 2020-11-03 | End: 2020-11-04

## 2020-11-03 NOTE — ADDENDUM NOTE
Addendum  created 11/02/20 1856 by Caryn Elizalde MD    Actions taken from a BestPractice Advisory, Order list changed
Irina

## 2020-11-03 NOTE — PROGRESS NOTES
Surgical Oncology Inpatient Progress Note    Subjective:  POD 1 from exploratory laparatomy, partial hepatetectomy segment 3, lysis of adhesions, intra operative ultrasound, cholecystectomy, microwave ablation to potentiate margin. Doing well.  Pain contro POD 1 from exploratory laparatomy, partial hepatetectomy segment 3, lysis of adhesions, intra operative ultrasound, cholecystectomy, microwave ablation to potentiate margin . Doing well and progressing as anticipated.     1- Diet: full liquids  2- Pain rusty

## 2020-11-03 NOTE — PROGRESS NOTES
[facility]    Progress Note    Chandrakant Hdez Patient Status:  Inpatient    10/8/1960 MRN [id number]   [facility] 2W/SW Attending Gus Cutler MD   [facility] Day # 1 PCP Glory Allen DO       Subjective:   Rebeca Christianson **OR** Glucose-Vitamin C, morphINE sulfate **OR** morphINE sulfate **OR** morphINE sulfate, oxyCODONE HCl **OR** oxyCODONE HCl **OR** morphINE Sulfate IR      Assessment and Plan:        Liver metastases (HCC)  SYNOPSIS OF HISTORY, Patient originally under lesion increased in size.  Most recent CT on 9/15/2020 showed a 32 x 15 x 22 mm omental tumor nodule abutting capsular margin of left lobe of liver previously measured 18 x 11 x 17 mm. It also demonstrated a peripherally calcified dependent pelvic lesion me

## 2020-11-03 NOTE — PLAN OF CARE
Pt rec'd at 11am from prev RN, on PCA pump, insulin drip, maintenance fluids. Aox4, calm coop pain ranging 2-4 feels PCA is helping. ABD binder in place, bowel sounds hypoactive but present. No n/v. Appetite poor but tolerating clear liq po diet.  Skin [employer]

## 2020-11-03 NOTE — PROGRESS NOTES
Patient arrived from PACU. Report received from [facility]. Patient was not banned, notified PACU of this, no PCA and patient reporting pain 8/10 10 min after arrival with only tylenol to give, no prn pain meds.   Attempted to reach patients surgeon and left

## 2020-11-03 NOTE — PROGRESS NOTES
[facility]  Anesthesiology Pain Management Progress Note      Patient name: Amrit Castaneda 63year old female  : 10/8/1960  MRN: [id number]    Diagnosis: [unfilled]    Reason for Consult: s/p liver surgery    Current hospital day: KELLY JOYCE

## 2020-11-03 NOTE — CONSULTS
[facility]    Report of Consultation    Avinash Jasmine Patient Status:  Inpatient    10/8/1960 MRN [id number]   Location [facility] 2W/SW Attending Delfina Bedoya MD   Hosp Day # 1 PCP Karl Gustafson DO     Date of [facility]miss[facility] at [facility] MAIN OR   • COLONOSCOPY     • HYSTERECTOMY  1999    OSIEL/BSO    • ILEOSTOMY TAKE DOWN N/A 7/21/2015    Performed by Remedios Mike MD at [address]   • PARISH LOCALIZATION WIRE 1 SITE LEFT (CPT=19281)     • OTHER SURGICAL HISTORY  03/31/2015    Cytoreduc kit.    Medications:    Current Facility-Administered Medications:   •  dextrose 5 % and 0.45 % NaCl with KCl 20 mEq infusion, , Intravenous, Continuous  •  Insulin Aspart Pen (NOVOLOG) 100 UNIT/ML flexpen 1-7 Units, 1-7 Units, Subcutaneous, TID CC  •  ins Exam is unremarkable. Neck: Supple. Lungs: Speaking in clear sentences, no audible wheezing  Cardiac: Regular rate and rhythm. Extremities:  No lower extremity edema noted. Skin: Normal texture and turgor.       Impression and Plan:  Patient Active Prob

## 2020-11-03 NOTE — CM/SW NOTE
SW self-referred to patient chart for discharge planning. SW attempted to call pt's room to discuss PT recommendations for HHC/PT services.  Pt is unable to speak at this time, but orin/Stephanie MORA () is at bedside and able to discuss pt's nee

## 2020-11-03 NOTE — PHYSICAL THERAPY NOTE
PHYSICAL THERAPY EVALUATION - INPATIENT     Room Number: 228/228-A  Evaluation Date: 11/3/2020  Type of Evaluation: Initial   Physician Order: PT Eval and Treat    Presenting Problem: exploratory laparotomy, liver resection, laproscopy  Reason for Therapy '6 clicks' Inpatient Basic Mobility Short Form. Research supports that patients with this level of impairment may benefit from home with home PT. Patient will benefit from continued IP PT services to address these deficits in preparation for discharge. resection of right diaphragm with repair HIPEC with cisplatin and doxorubicin, diverting loop ileostomy. PCI 12, CC-0 achieved. 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 Ileostomy was taken down on 7/21/2015.  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 \"    PHYSICAL THERAPY EXAMINATION     OBJECTIVE  Precautions:  Other (Comment) education provided;Family present    CURRENT GOALS    Goals to be met by: 11/17/20  Patient Goal Patient's self-stated goal is: return home.     Goal #1 Patient is able to demonstrate supine - sit EOB @ level: modified independent     Goal #1   Current Stat

## 2020-11-03 NOTE — HOME CARE LIAISON
Received referral from [address] [place] [address]. Spoke to patient's dtr, Ara Ledesma via phone and is agreeable to  services. All questions and concerns addressed.

## 2020-11-04 ENCOUNTER — APPOINTMENT (OUTPATIENT)
Dept: GENERAL RADIOLOGY | Facility: HOSPITAL | Age: 60
DRG: 357 | End: 2020-11-04
Attending: HOSPITALIST
Payer: MEDICARE

## 2020-11-04 PROCEDURE — 99232 SBSQ HOSP IP/OBS MODERATE 35: CPT | Performed by: INTERNAL MEDICINE

## 2020-11-04 PROCEDURE — 99233 SBSQ HOSP IP/OBS HIGH 50: CPT | Performed by: HOSPITALIST

## 2020-11-04 PROCEDURE — 74019 RADEX ABDOMEN 2 VIEWS: CPT | Performed by: HOSPITALIST

## 2020-11-04 RX ORDER — MAGNESIUM SULFATE HEPTAHYDRATE 40 MG/ML
2 INJECTION, SOLUTION INTRAVENOUS ONCE
Status: COMPLETED | OUTPATIENT
Start: 2020-11-04 | End: 2020-11-04

## 2020-11-04 RX ORDER — METOPROLOL TARTRATE 5 MG/5ML
5 INJECTION INTRAVENOUS EVERY 6 HOURS
Status: DISCONTINUED | OUTPATIENT
Start: 2020-11-04 | End: 2020-11-04

## 2020-11-04 RX ORDER — HYDROCODONE BITARTRATE AND ACETAMINOPHEN 5; 325 MG/1; MG/1
1 TABLET ORAL EVERY 4 HOURS PRN
Status: DISCONTINUED | OUTPATIENT
Start: 2020-11-04 | End: 2020-11-05

## 2020-11-04 RX ORDER — METOCLOPRAMIDE 10 MG/1
10 TABLET ORAL EVERY 6 HOURS PRN
Status: DISCONTINUED | OUTPATIENT
Start: 2020-11-04 | End: 2020-11-07

## 2020-11-04 RX ORDER — METOPROLOL TARTRATE 5 MG/5ML
5 INJECTION INTRAVENOUS EVERY 6 HOURS
Status: DISCONTINUED | OUTPATIENT
Start: 2020-11-04 | End: 2020-11-05

## 2020-11-04 RX ORDER — DEXTROSE, SODIUM CHLORIDE, AND POTASSIUM CHLORIDE 5; .45; .15 G/100ML; G/100ML; G/100ML
INJECTION INTRAVENOUS CONTINUOUS
Status: DISCONTINUED | OUTPATIENT
Start: 2020-11-04 | End: 2020-11-04

## 2020-11-04 RX ORDER — SODIUM CHLORIDE, SODIUM LACTATE, POTASSIUM CHLORIDE, CALCIUM CHLORIDE 600; 310; 30; 20 MG/100ML; MG/100ML; MG/100ML; MG/100ML
INJECTION, SOLUTION INTRAVENOUS CONTINUOUS
Status: DISCONTINUED | OUTPATIENT
Start: 2020-11-04 | End: 2020-11-06

## 2020-11-04 RX ORDER — METOCLOPRAMIDE HYDROCHLORIDE 5 MG/ML
10 INJECTION INTRAMUSCULAR; INTRAVENOUS EVERY 6 HOURS PRN
Status: DISCONTINUED | OUTPATIENT
Start: 2020-11-04 | End: 2020-11-07

## 2020-11-04 RX ORDER — CARVEDILOL 12.5 MG/1
12.5 TABLET ORAL 2 TIMES DAILY WITH MEALS
Status: DISCONTINUED | OUTPATIENT
Start: 2020-11-04 | End: 2020-11-04

## 2020-11-04 RX ORDER — ENALAPRILAT 2.5 MG/2ML
1.25 INJECTION INTRAVENOUS EVERY 6 HOURS PRN
Status: DISCONTINUED | OUTPATIENT
Start: 2020-11-04 | End: 2020-11-07

## 2020-11-04 RX ORDER — HYDROCODONE BITARTRATE AND ACETAMINOPHEN 5; 325 MG/1; MG/1
2 TABLET ORAL EVERY 4 HOURS PRN
Status: DISCONTINUED | OUTPATIENT
Start: 2020-11-04 | End: 2020-11-05

## 2020-11-04 NOTE — PROGRESS NOTES
[facility]    Progress Note    Valencia Virk Patient Status:  Inpatient    10/8/1960 MRN [id number]   Location [facility] 2W/SW Attending Radha Cole MD   [address] Day # 2 PCP Nina Franco DO     Subjective:  Feels okay  S induced diarrhea     Abnormal urinalysis     Hypomagnesemia     Abdominal pain     SHERMAN (obstructive sleep apnea)     Appendicitis     Preop testing     Liver metastases [facility])    Patient is a 63year old female with Type 2 DM, who is s/p surgery for ovarian

## 2020-11-04 NOTE — PLAN OF CARE
Problem: Patient Centered Care  Goal: Patient preferences are identified and integrated in the patient's plan of care  Description: Interventions:  - What would you like us to know as we care for you?  I only take naps and awake for most of the night  - P

## 2020-11-04 NOTE — PROGRESS NOTES
Surgical Oncology Inpatient Progress Note    Subjective:  POD 2 from exploratory laparatomy, partial hepatetectomy segment 3, lysis of adhesions, intra operative ultrasound, cholecystectomy, microwave ablation to potentiate margin. Doing well.  C/o nausea POD 2 from exploratory laparatomy, partial hepatetectomy segment 3, lysis of adhesions, intra operative ultrasound, cholecystectomy, microwave ablation to potentiate margin.      1- Diet: NPO except ice chips and sips of clears  2- Pain management: PCA, IV

## 2020-11-04 NOTE — PHYSICAL THERAPY NOTE
Chart reviewed, discussed case with RN. Pt with N/V this AM, recently fell asleep. RN requesting f/u this PM. Will f/u in PM for PT tx as appropriate, schedule permitting.     Katya Medellin, TIFFANIET  Physical Therapy  [facility] [employer] #[phone]

## 2020-11-04 NOTE — PLAN OF CARE
Pt AOX4 rec'd with bilious n/v from night shift, started around 5am per pm rn. Not relieved with Ronni Ulrich did relieve all s/s. Midline abd incision c/d/I/ eval'd by surgeon. Kept to water while nauseous and now clear to advance to clear liquids.  Pain

## 2020-11-04 NOTE — PROGRESS NOTES
[facility]    Progress Note    Cornelius Luo Patient Status:  Inpatient    10/8/1960 MRN [id number]   Location [facility] 2W/SW Attending Rossana Acevedo MD   [facility] Day # 2 PCP Jeni Tsang DO       Subjective:   Jimmie Rodgers • acetaminophen  1,000 mg Intravenous Q6H   • escitalopram  10 mg Oral Daily       Current PRN Inpatient Meds:      Metoclopramide HCl **OR** Metoclopramide HCl, enalaprilat, ondansetron HCl, glucose **OR** Glucose-Vitamin C **OR** dextrose **OR** glucos DVT prevention   - Surgical oncology following   - diet per surgery     Postoperative ileus  - clear liquids as tolerated. - cont zofran and reglan PRN   - increase activity   - monitor. Other medical problems  HTN - cont lopressor scheduled.  Add IV

## 2020-11-04 NOTE — PROGRESS NOTES
Double RN skin check done prior to transfer off Unit. Skin check performed by this RN and Jeferson Farr. Wounds are as follows: midline abd incision, c/d/i with abd binder in place. Will remain available for any further questions or concerns.

## 2020-11-04 NOTE — PHYSICAL THERAPY NOTE
PHYSICAL THERAPY TREATMENT NOTE - INPATIENT     Room Number: 460/460-A       Presenting Problem: exploratory laparotomy, liver resection, laproscopy    Problem List  Active Problems:    Essential hypertension    DMII (diabetes mellitus, type 2) (HCC)    OS mechanics; Endurance; Energy conservation;Patient education;Gait training;Stair training;Transfer training;Balance training    SUBJECTIVE  \"Can I put something on? I feel a little cold. \"    OBJECTIVE  Precautions:  Other (Comment)(abdominal binder and preca aware of session/findings; All patient questions and concerns addressed; Family present    CURRENT GOALS   Goals to be met by: 11/17/20  Patient Goal Patient's self-stated goal is: return home.     Goal #1 Patient is able to demonstrate supine - sit EOB @ lev

## 2020-11-05 ENCOUNTER — APPOINTMENT (OUTPATIENT)
Dept: GENERAL RADIOLOGY | Facility: HOSPITAL | Age: 60
DRG: 357 | End: 2020-11-05
Attending: HOSPITALIST
Payer: MEDICARE

## 2020-11-05 PROCEDURE — 99233 SBSQ HOSP IP/OBS HIGH 50: CPT | Performed by: HOSPITALIST

## 2020-11-05 PROCEDURE — 71045 X-RAY EXAM CHEST 1 VIEW: CPT | Performed by: HOSPITALIST

## 2020-11-05 RX ORDER — ACETAMINOPHEN 160 MG/5ML
650 SOLUTION ORAL EVERY 6 HOURS PRN
Status: DISCONTINUED | OUTPATIENT
Start: 2020-11-05 | End: 2020-11-07

## 2020-11-05 RX ORDER — POTASSIUM CHLORIDE 20 MEQ/1
40 TABLET, EXTENDED RELEASE ORAL ONCE
Status: COMPLETED | OUTPATIENT
Start: 2020-11-05 | End: 2020-11-05

## 2020-11-05 RX ORDER — CARVEDILOL 12.5 MG/1
12.5 TABLET ORAL 2 TIMES DAILY WITH MEALS
Status: DISCONTINUED | OUTPATIENT
Start: 2020-11-05 | End: 2020-11-07

## 2020-11-05 RX ORDER — HYDROCODONE BITARTRATE AND ACETAMINOPHEN 10; 325 MG/1; MG/1
1 TABLET ORAL EVERY 4 HOURS PRN
Status: DISCONTINUED | OUTPATIENT
Start: 2020-11-05 | End: 2020-11-07

## 2020-11-05 NOTE — OPERATIVE REPORT
Date of operation 11/2/2020    Preoperative diagnosis:  1. History of metastatic papillary serous carcinoma  2. History of cytoreduction/heated intraperitoneal chemotherapy    Operations performed:  1. Exploratory laparotomy  2.   Partial hepatectomy, se performed and the administration of antibiotics. Everybody in the room was in agreement. I started by making midline incision which extended from the xiphoid process towards the umbilicus. This was deepened through skin and subcutaneous tissue.   The fas were correct. I proceeded to close the fascia with #1 looped PDS suture. Skin subcutaneous tissue were irrigated. The skin was closed with staples. Sterile dressings were applied.     The patient tolerated the procedure well was taken to the postoperati

## 2020-11-05 NOTE — PROGRESS NOTES
[place] [facility] - [facility]    Progress Note    Radha Reyez Patient Status:  Inpatient    10/8/1960 MRN [id number]   Location [facility] 2W/SW Attending Thomas Stephens MD   [facility] Day # 3 PCP Benjamin Luna DO       Subjective:   Thegenesis Daughters escitalopram  10 mg Oral Daily       Current PRN Inpatient Meds:      acetaminophen, HYDROcodone-acetaminophen, Metoclopramide HCl **OR** Metoclopramide HCl, enalaprilat, ondansetron HCl, glucose **OR** Glucose-Vitamin C **OR** dextrose **OR** glucose **OR advised.     Dictated by (CST): Min Obregon MD on 11/04/2020 at 2:41 PM     Finalized by (CST): Mni Obregon MD on 11/04/2020 at 2:46 PM              Assessment and Plan:     Omental tumor nodule abutting liver lobe   S/p exploratory laparotomy , parti

## 2020-11-05 NOTE — PROGRESS NOTES
Surgical Oncology Inpatient Progress Note    Subjective:  POD 3 from exploratory laparatomy, partial hepatetectomy segment 3, lysis of adhesions, intra operative ultrasound, cholecystectomy, microwave ablation to potentiate margin.   Feeling much better tod POD 3 from exploratory laparatomy, partial hepatetectomy segment 3, lysis of adhesions, intra operative ultrasound, cholecystectomy, microwave ablation to potentiate margin.     - Diet: clears, advance as tolerated  - Pain management: po tylenol, po norco

## 2020-11-05 NOTE — CM/SW NOTE
CM made aware during nursing rounds that pt maybe ready for dc in 1-2 days. [facility] liaison in rounds updated and F2F entered. Hawa Mathew.  Laura Han RN, BSN  Nurse   [phone]

## 2020-11-05 NOTE — SPIRITUAL CARE NOTE
Pt said she was doing well, accompanied by her daugther, her daugther said she was able to bring her mom to the terrace today and did a short mindfulness practice for healing, which they felt was helpful. Pt has good spirit, is resilient and strong.   Chapl

## 2020-11-05 NOTE — PROGRESS NOTES
[facility]    Progress Note    Camron De León Patient Status:  Inpatient    10/8/1960 MRN [id number]   Location [facility] 2W/SW Attending Amandeep Chi MD   [address] Day # 3 PCP Hugo Delgado DO     Subjective:  Feels okay Hypomagnesemia     Abdominal pain     SHERMAN (obstructive sleep apnea)     Appendicitis     Preop testing     Liver metastases [facility])    Patient is a 63year old female with Type 2 DM, who is s/p surgery for ovarian cancer with liver metastases.    She was star

## 2020-11-05 NOTE — DIETARY NOTE
NUTRITION EDUCATION NOTE    Received consult for nutrition education. Appropriate education and handout provided. See education section of Epic for specifics.     Fang Patterson RDN, LDN   Clinical Nutrition  Ext [phone]

## 2020-11-05 NOTE — PROGRESS NOTES
[facility] daughter. Pt is A&Ox4, VSS on room air. Complaining of mild pain, but declining medication. PRN reglan given before dinner. Pt tolerated clear liquid diet, no n/v. Port accessed, good blood return.  Call l

## 2020-11-05 NOTE — PLAN OF CARE
Patient alert and oriented. Pain better managed today with norco. Midline with gauze, abdominal binder. 2 large Bms today, voiding, UA sent. Tele, no calls. Tolerating advancement of diet, denies nausea. Up with SBA and walker.  ACHS, remains high, managed Modify environment to reduce risk of injury  - Provide assistive devices as appropriate  - Consider OT/PT consult to assist with strengthening/mobility  - Encourage toileting schedule  Outcome: Progressing

## 2020-11-05 NOTE — PLAN OF CARE
Patient is alert and oriented times 4, room air, ambulating with one person assist and a walker. Patient ambulated dela cruz this morning. IVFs infusing through L-port. L-port- flushed and blood return noted. Tolerating clear diet.  Felt nauseous this morning WA schedule  Outcome: Progressing     Problem: Diabetes/Glucose Control  Goal: Glucose maintained within prescribed range  Description: INTERVENTIONS:  - Monitor Blood Glucose as ordered  - Assess for signs and symptoms of hyperglycemia and hypoglycemia  - Ad

## 2020-11-06 PROCEDURE — 99232 SBSQ HOSP IP/OBS MODERATE 35: CPT | Performed by: INTERNAL MEDICINE

## 2020-11-06 PROCEDURE — 99232 SBSQ HOSP IP/OBS MODERATE 35: CPT | Performed by: HOSPITALIST

## 2020-11-06 RX ORDER — ONDANSETRON 4 MG/1
4 TABLET, ORALLY DISINTEGRATING ORAL EVERY 8 HOURS PRN
Qty: 10 TABLET | Refills: 0 | Status: SHIPPED | OUTPATIENT
Start: 2020-11-06 | End: 2020-11-11 | Stop reason: ALTCHOICE

## 2020-11-06 RX ORDER — LOPERAMIDE HYDROCHLORIDE 2 MG/1
2 CAPSULE ORAL 3 TIMES DAILY PRN
Status: DISCONTINUED | OUTPATIENT
Start: 2020-11-06 | End: 2020-11-07

## 2020-11-06 RX ORDER — HYDROCODONE BITARTRATE AND ACETAMINOPHEN 10; 325 MG/1; MG/1
1 TABLET ORAL EVERY 4 HOURS PRN
Qty: 20 TABLET | Refills: 0 | Status: SHIPPED | OUTPATIENT
Start: 2020-11-06 | End: 2020-11-11

## 2020-11-06 RX ORDER — ENOXAPARIN SODIUM 100 MG/ML
40 INJECTION SUBCUTANEOUS DAILY
Qty: 23 SYRINGE | Refills: 0 | Status: SHIPPED | OUTPATIENT
Start: 2020-11-07 | End: 2020-11-30

## 2020-11-06 NOTE — PROGRESS NOTES
Attempted to see the patient, but was gone for surgery  Patient is a 63year old female with Type 2 DM, who is s/p surgery for ovarian cancer with liver metastases.      PLAN:   Levemir 46 daily  Novolog 18 along with medium dose CF with meals  Accuchecks a

## 2020-11-06 NOTE — PROGRESS NOTES
Surgical Oncology Inpatient Progress Note    Subjective:  POD 3 from exploratory laparatomy, partial hepatetectomy segment 3, lysis of adhesions, intra operative ultrasound, cholecystectomy, microwave ablation to potentiate margin. Feels well. THADDEUS Ramos POD 4 from exploratory laparatomy, partial hepatetectomy segment 3, lysis of adhesions, intra operative ultrasound, cholecystectomy, microwave ablation to potentiate margin.     - Diet: full liquids, advance as tolerated  - Pain management: po tylenol, po

## 2020-11-06 NOTE — PROGRESS NOTES
[facility]    Progress Note    Luis Mock Patient Status:  Inpatient    10/8/1960 MRN [id number]   Location [facility] 2W/SW Attending Kaitlynn Chappell MD   [facility] Day # 4 PCP Yanira Cruz DO       Subjective:   Sandra Thomason HYDROcodone-acetaminophen, Metoclopramide HCl **OR** Metoclopramide HCl, enalaprilat, ondansetron HCl, glucose **OR** Glucose-Vitamin C **OR** dextrose **OR** glucose **OR** Glucose-Vitamin C    Results:     Recent Labs   Lab 11/04/20  0540 11/05/20  0417 fluid-filled distention of the stomach. Findings may suggest postoperative ileus, but I cannot exclude developing small-bowel obstruction. Correlate clinically and follow-up studies are advised.     Dictated by (CST): Jeanette Navarrete MD on 11/04/2020 at 2:

## 2020-11-06 NOTE — PLAN OF CARE
Pt a/o x4, room air, stable vital signs. Remote tele in place no calls from telemetry. Pt on soft diet tolerating well. Pt is ambulating by herself. Fluids discontinued. Pt voiding appropriately. Plan is to discharge home with residential home health.  Pt c effects  - Notify MD/LIP if interventions unsuccessful or patient reports new pain  - Anticipate increased pain with activity and pre-medicate as appropriate  Outcome: Progressing     Problem: SAFETY ADULT - FALL  Goal: Free from fall injury  Description:

## 2020-11-06 NOTE — PROGRESS NOTES
[place] [facility] - [facility]    Progress Note    Belkys Elvisjuanita Patient Status:  Inpatient    10/8/1960 MRN [id number]   Location [facility] 2W/SW Attending Soniya Zapata MD   [address] Day # 4 PCP Bambi Overton DO     Subjective:  Feels okay  S diarrhea     Abnormal urinalysis     Hypomagnesemia     Abdominal pain     SHERMAN (obstructive sleep apnea)     Appendicitis     Preop testing     Liver metastases [facility])    Patient is a 63year old female with Type 2 DM, who is s/p surgery for ovarian cancer

## 2020-11-06 NOTE — PLAN OF CARE
Patient is alert and oriented times 4, room air, ambulating independently. Remote tele in place with no calls. Remained afebrile. IVFs infusing at 50 mL/hr through L-Port. Port flushed- blood return noted. Tolerating soft diet. Accu-checks AC/HS.   Addition schedule  Outcome: Progressing     Problem: Diabetes/Glucose Control  Goal: Glucose maintained within prescribed range  Description: INTERVENTIONS:  - Monitor Blood Glucose as ordered  - Assess for signs and symptoms of hyperglycemia and hypoglycemia  - Ad

## 2020-11-07 VITALS
TEMPERATURE: 100 F | HEIGHT: 64 IN | OXYGEN SATURATION: 97 % | WEIGHT: 207.25 LBS | HEART RATE: 78 BPM | SYSTOLIC BLOOD PRESSURE: 136 MMHG | DIASTOLIC BLOOD PRESSURE: 68 MMHG | BODY MASS INDEX: 35.38 KG/M2 | RESPIRATION RATE: 20 BRPM

## 2020-11-07 PROCEDURE — 99239 HOSP IP/OBS DSCHRG MGMT >30: CPT | Performed by: HOSPITALIST

## 2020-11-07 PROCEDURE — 99232 SBSQ HOSP IP/OBS MODERATE 35: CPT | Performed by: INTERNAL MEDICINE

## 2020-11-07 RX ORDER — CIPROFLOXACIN 250 MG/1
250 TABLET, FILM COATED ORAL 2 TIMES DAILY
Qty: 6 TABLET | Refills: 0 | Status: SHIPPED | OUTPATIENT
Start: 2020-11-07 | End: 2020-11-10

## 2020-11-07 RX ORDER — BLOOD SUGAR DIAGNOSTIC
STRIP MISCELLANEOUS
Qty: 50 STRIP | Refills: 6 | Status: SHIPPED | OUTPATIENT
Start: 2020-11-07 | End: 2021-04-27 | Stop reason: ALTCHOICE

## 2020-11-07 RX ORDER — DEXTROSE MONOHYDRATE 25 G/50ML
50 INJECTION, SOLUTION INTRAVENOUS
Status: DISCONTINUED | OUTPATIENT
Start: 2020-11-07 | End: 2020-11-07

## 2020-11-07 NOTE — PLAN OF CARE
Pt ambulating the halls with daughter early evening. Pt with tele, SR. Pt with midline incision with staples, JOCELYNN and with abdominal binder. Pt requesting norco at 2130 for pain 4/10 and to help sleep.  Pt tolerating soft diet well, no complaints of nausea,

## 2020-11-07 NOTE — CM/SW NOTE
Per RN, pt will discharge today. JALEN notified Anika Gamez from [facility]. F2F already entered. JALEN/FRANSISCO to remain available for support and/or discharge planning.      Zackary Gonzales

## 2020-11-07 NOTE — PLAN OF CARE
Pt a/o x4, room air with stable vital signs. Pt ambulating and voiding freely. Pt educated on how to implement Lovenox at home. Pt on telemetry monitoring no calls this shift. Plan is to discharge home with residential home health.      Problem: Patient Nafisa Anticipate increased pain with activity and pre-medicate as appropriate  Outcome: Adequate for Discharge     Problem: SAFETY ADULT - FALL  Goal: Free from fall injury  Description: INTERVENTIONS:  - Assess pt frequently for physical needs  - Identify cogni

## 2020-11-07 NOTE — DISCHARGE SUMMARY
[facility] Discharge Summary   Patient ID:  Adryan Velasquez  [id number]  63year old  10/8/1960    Admit date: 11/2/2020  Discharge date: 11/7/2020  Primary Care Physician: Jonny Masters DO   Attending Physician: George Love MD   Consults Patient originally underwent OSIEL/BSO in 1999 for fibroids. Pathology showed right ovary with papillary serous cystadenoma. She remained well until 2013 when she presented with abdominal pain.  She was taken to the OR by Dr. Robbie Crowell in 12/2013 for laparoscop dependent pelvic lesion measuring 2.4 x 1.1 cm corresponds to a remote probably fat containing 2.9 x 1.3 cm lesion and either represents jules fat necrosis or a partially treated metastatic lesion.      Hospital Course:  Omental tumor nodule abutting CONCLUSION:  1. Moderate dilatation of mid small bowel with air-fluid levels. Air in nondilated colon. Moderate air and fluid-filled distention of the stomach.   Findings may suggest postoperative ileus, but I cannot exclude developing small-bowel obstruc OmniPod 5 Pack Misc  1 each by Does not apply route every other day. * This list has 2 medication(s) that are the same as other medications prescribed for you.  Read the directions carefully, and ask your doctor or other care provider to review t ? Drink 8 (8-ounce) glasses (2 liters) of liquids daily, if you can. Drink water, juices (such as prune juice), soups, ice cream shakes, and other drinks that don't have caffeine.    ? If you haven't had a bowel movement in 2 days, call your doctor or nurse Ask your surgeon when it is okay for you to drive. Ask your surgeon when you can return to work. When to Call:  Let us know if you experience the following symptoms:  ? Fever of 100.4° F (38°C) or higher  ?  Cloudy or smelly drainage from the incision si Patient had opportunity to ask questions, state understanding, and agree with therapeutic plan as outlined    Nataly Ryder MD  Hospitalist  11/7/2020

## 2020-11-07 NOTE — PROGRESS NOTES
[facility]    Progress Note    Sacha Bejarano Patient Status:  Inpatient    10/8/1960 MRN [id number]   Location [facility] 2W/SW Attending Elizabeth Woodall MD   [facility] Day # 5 PCP Briana Hernández DO     Subjective:  Feels okay  P diarrhea     Abnormal urinalysis     Hypomagnesemia     Abdominal pain     SHERMAN (obstructive sleep apnea)     Appendicitis     Preop testing     Liver metastases [facility])    Patient is a 63year old female with Type 2 DM, who is s/p surgery for ovarian cancer

## 2020-11-07 NOTE — PHYSICAL THERAPY NOTE
PHYSICAL THERAPY TREATMENT NOTE - INPATIENT     Room Number: 460/460-A       Presenting Problem: exploratory laparotomy, liver resection, laproscopy    Problem List  Active Problems:    Essential hypertension    DMII (diabetes mellitus, type 2) (HCC)    OS on the side of the bed?: A Little   How much help from another person does the patient currently need. ..   -   Moving to and from a bed to a chair (including a wheelchair)?: A Little   -   Need to walk in hospital room?: A Little   -   Climbing 3-5 steps w

## 2020-11-07 NOTE — PROGRESS NOTES
Surgical Oncology Inpatient Progress Note    Subjective:  POD 5 from exploratory laparatomy, partial hepatetectomy segment 3, lysis of adhesions, intra operative ultrasound, cholecystectomy, microwave ablation to potentiate margin.   Doing well      Objecti May DC today  Went over instructions      Nora Cooks, MD  Complex General Surgical Oncology  [facility]  Pager [phone]  [email or web address]

## 2020-11-08 ENCOUNTER — TELEPHONE (OUTPATIENT)
Dept: CARDIOLOGY UNIT | Facility: HOSPITAL | Age: 60
End: 2020-11-08

## 2020-11-09 ENCOUNTER — SOCIAL WORK SERVICES (OUTPATIENT)
Dept: HEMATOLOGY/ONCOLOGY | Facility: HOSPITAL | Age: 60
End: 2020-11-09

## 2020-11-09 ENCOUNTER — TELEPHONE (OUTPATIENT)
Dept: SURGERY | Facility: CLINIC | Age: 60
End: 2020-11-09

## 2020-11-09 ENCOUNTER — TELEPHONE (OUTPATIENT)
Dept: MEDSURG UNIT | Facility: HOSPITAL | Age: 60
End: 2020-11-09

## 2020-11-09 NOTE — PROGRESS NOTES
11/09:Paperwork received for dtr Annie Pettit request for [facility] during the pt's post-operative recovery. Per RN, plan is for 4-6 weeks of rest and recovery.   JALEN drafted the paperwork, completed by MD. Call placed to dtr Virginia Ji [phone] to verify pl

## 2020-11-09 NOTE — TELEPHONE ENCOUNTER
Spoke with pt, Pt is feeling well and had a good night sleep, scheduled f/u appt this Wednesday at 1pm. Pt is declining outside therapy services at this time.

## 2020-11-09 NOTE — TELEPHONE ENCOUNTER
Pt is requesting a refill for freestyle  test strips.         Current Outpatient Medications:     •  Glucose Blood (FREESTYLE TEST) In Vitro Strip, Test blood glucose level 5 times per day, Disp: 200 each, Rfl: 11

## 2020-11-11 ENCOUNTER — OFFICE VISIT (OUTPATIENT)
Dept: SURGERY | Facility: CLINIC | Age: 60
End: 2020-11-11
Payer: MEDICARE

## 2020-11-11 VITALS
BODY MASS INDEX: 34.31 KG/M2 | SYSTOLIC BLOOD PRESSURE: 122 MMHG | HEART RATE: 81 BPM | RESPIRATION RATE: 16 BRPM | WEIGHT: 201 LBS | HEIGHT: 64 IN | DIASTOLIC BLOOD PRESSURE: 70 MMHG | OXYGEN SATURATION: 91 %

## 2020-11-11 DIAGNOSIS — C48.2 PAPILLARY SEROUS CARCINOMA OF PERITONEUM (HCC): ICD-10-CM

## 2020-11-11 DIAGNOSIS — C56.9 MALIGNANT NEOPLASM OF OVARY, UNSPECIFIED LATERALITY (HCC): Primary | ICD-10-CM

## 2020-11-11 DIAGNOSIS — C78.6 PERITONEAL CARCINOMATOSIS (HCC): ICD-10-CM

## 2020-11-11 PROCEDURE — 1111F DSCHRG MED/CURRENT MED MERGE: CPT | Performed by: SURGERY

## 2020-11-11 PROCEDURE — 99024 POSTOP FOLLOW-UP VISIT: CPT | Performed by: SURGERY

## 2020-11-11 RX ORDER — HYDROCODONE BITARTRATE AND ACETAMINOPHEN 10; 325 MG/1; MG/1
1 TABLET ORAL EVERY 4 HOURS PRN
Qty: 20 TABLET | Refills: 0 | Status: SHIPPED | OUTPATIENT
Start: 2020-11-11 | End: 2021-04-27 | Stop reason: ALTCHOICE

## 2020-11-13 NOTE — PROGRESS NOTES
[facility]    Patient Name:  hCandrakant Hdez   YOB: 1960   Gender:  Female   Appt Date:  11/13/2020   Provider:  Tacho Brown MD   Insurance:  [address] [place] [address] liver returned to show low-grade serous carcinoma, measuring 3.3 cm in greatest dimension, involving hepatic serosal surface without invasion into hepatic parenchyma.  Pathology of gallbladder showed low-grade serous carcinoma involving the gallbladder subs remained stable. 12/20/2018: CT showed a 5 x 8 mm soft tissue focus ventral to left lobe of liver which was not present on the prior exam. This remained stable until 12/2019, when lesion increased in size.  Most recent CT on 9/15/2020 showed a 32 x 15 x 22 TAKE 1 TABLET BY MOUTH EVERY DAY AT NIGHT, Disp: 90 tablet, Rfl: 1  •  insulin aspart (NOVOLOG) 100 UNIT/ML Subcutaneous Solution, Inject via insulin pump, maximum of 200 units daily, Disp: 18 vial, Rfl: 1  •  CARVEDILOL 12.5 MG Oral Tab, TAKE 1 TABLET 2 ( Pancreatitis 2013    ERCP with stent   • Personal history of antineoplastic chemotherapy 2/14-5/31/2014    Chemo   • Primary peritoneal carcinomatosis ([facility] [address] 02/05/2014    \"Psamomma carcinoma/Peritonal implants; Management:   In progress\"   • Type 1 diabet Number of children: Not on file      Years of education: Not on file      Highest education level: Not on file    Tobacco Use      Smoking status: Former Smoker        Packs/day: 1.00        Years: 15.00        Pack years: 15        Types: Cigarettes Neurological: She is alert and oriented to person, place, and time. Skin: Skin is warm and dry. Psychiatric: She has a normal mood and affect. Document Review:  Pathology 11/2/2020:  Final Diagnosis:      A.  Liver, segment 3; partial hepatectomy: papillary serous cystadenoma/peritoneal carcinomatosis s/p CRS/HIPEC with Dr. Lomeli Roles in March 2015, type 2 DM, HTN who was found to have a 32 x 15 x 22 mm omental tumor nodule abutting capsular margin of left lobe of liver.   She is s/p exploratory laparotom

## 2020-11-14 ENCOUNTER — PATIENT MESSAGE (OUTPATIENT)
Dept: ENDOCRINOLOGY CLINIC | Facility: CLINIC | Age: 60
End: 2020-11-14

## 2020-11-15 NOTE — TELEPHONE ENCOUNTER
From: Sacha Bejarano  To: Cynthia Mccartney MD  Sent: 11/14/2020 1:22 PM CST  Subject: Prescription Question    Hello,  The last prescription I picked up was incorrect. The Hospitalist needed to order me  the “FreeStyle” strips for my Omnipod.   I will try to a

## 2020-11-16 RX ORDER — BLOOD-GLUCOSE METER
KIT MISCELLANEOUS
Qty: 200 STRIP | Refills: 2 | Status: SHIPPED | OUTPATIENT
Start: 2020-11-16 | End: 2020-11-18

## 2020-11-17 ENCOUNTER — TELEPHONE (OUTPATIENT)
Dept: ENDOCRINOLOGY CLINIC | Facility: CLINIC | Age: 60
End: 2020-11-17

## 2020-11-17 NOTE — TELEPHONE ENCOUNTER
Pt states she received test from [facility] stating additional info is needed to process freestyle test strip rx - they did not say what info is needed - pls call pharm

## 2020-11-18 ENCOUNTER — OFFICE VISIT (OUTPATIENT)
Dept: SURGERY | Facility: CLINIC | Age: 60
End: 2020-11-18
Payer: MEDICARE

## 2020-11-18 VITALS
HEART RATE: 96 BPM | BODY MASS INDEX: 33.23 KG/M2 | WEIGHT: 194.63 LBS | OXYGEN SATURATION: 96 % | RESPIRATION RATE: 16 BRPM | DIASTOLIC BLOOD PRESSURE: 70 MMHG | SYSTOLIC BLOOD PRESSURE: 134 MMHG | HEIGHT: 64 IN

## 2020-11-18 DIAGNOSIS — C78.7 LIVER METASTASES (HCC): ICD-10-CM

## 2020-11-18 DIAGNOSIS — C78.6 PERITONEAL CARCINOMATOSIS (HCC): Primary | ICD-10-CM

## 2020-11-18 DIAGNOSIS — C56.9 PAPILLARY SEROUS ADENOCARCINOMA (HCC): ICD-10-CM

## 2020-11-18 PROCEDURE — 99024 POSTOP FOLLOW-UP VISIT: CPT | Performed by: SURGERY

## 2020-11-18 RX ORDER — BLOOD-GLUCOSE METER
KIT MISCELLANEOUS
Qty: 200 STRIP | Refills: 2 | Status: SHIPPED | OUTPATIENT
Start: 2020-11-18

## 2020-11-18 NOTE — TELEPHONE ENCOUNTER
Dr. Camila Arroyo please advise. I Contacted pharmacy, pharmacist stts that rx needs icd 10 code. rx pended with code of E11.65.  Please sign if appropriate

## 2020-11-19 NOTE — PROGRESS NOTES
[facility]    Patient Name:  David Sosa   YOB: 1960   Gender:  Female   Appt Date:  11/19/2020   Provider:  Nora Cooks, MD   Insurance:  [address] [place] [address] Camron De León presents today for a post operative appointment. Below is a oncologic synopsis. Briefly, she is a 63year old female with past medical history of papillary serous carcinoma s/p CRS/HIPEC with Dr. Piero Núñez in March 2015, type 2 DM, HTN.  She was 3/31/2015 :  She was taken to the OR by Dr. Dori Kehr for a cytoreductive surgery to include peritonectomy, en bloc small bowel resection, partial colectomy, omentectomy, partial resection of abdominal wall, partial cystectomy, small bowel resection, low anteri 11/2/2020: patient taken to the operating room for an exploratory laparotomy, partial hepatectomy segment 3, cholecystectomy. She tolerated procedure well.  Pathology of liver returned to show low-grade serous carcinoma, measuring 3.3 cm in greatest dimensi •  LETROZOLE 2.5 MG Oral Tab, TAKE 1 TABLET BY MOUTH EVERY DAY, Disp: 90 tablet, Rfl: 1  •  Insulin Lispro (HUMALOG) 100 UNIT/ML Subcutaneous Solution, 200 units via insulin pump, Disp: 180 mL, Rfl: 1  •  CITALOPRAM HYDROBROMIDE 10 MG Oral Tab, TAKE 1 TABL • APPENDECTOMY  12/2013   • BREAST BIOPSY Left     x's 2 benign   • CATHETER INSERTION PORT-A-CATH N/A 7/21/2015    Performed by Ember Murillo MD at [address] [facility]   • CHOLECYSTECTOMY N/A 11/2/2020    Performed by Akbar Olsen MD at [address] [place] [address] Drug use: No    Other Topics      Concerns:        Caffeine Concern: No     Reviewed:  Family History   Problem Relation Age of Onset   • Cancer Mother 70        ovarian, breast   • Hypertension Mother    • Diabetes Mother    • Heart Disease Mother · Invasion into the hepatic parenchyma not identified  · Inked hepatic parenchymal surgical resection margin free of tumor, 0.2 cm to margin  · Uninvolved hepatic parenchyma with cirrhosis and moderate microvesicular and macrovesicular steatosis  · See com She is s/p exploratory laparotomy, partial hepatectomy segment 3, cholecystectomy on 11/2/2020.  Pathology of liver showed low-grade serous carcinoma, measuring 3.3 cm in greatest dimension, involving hepatic serosal surface without invasion into hepatic pa

## 2020-11-20 NOTE — TELEPHONE ENCOUNTER
Pharmacy sent a message saying that 3 times per day are all that insurance will allow. Asked for a new script with the testing changed to 3 times per day or consider an alternative

## 2020-11-21 ENCOUNTER — PATIENT MESSAGE (OUTPATIENT)
Dept: ENDOCRINOLOGY CLINIC | Facility: CLINIC | Age: 60
End: 2020-11-21

## 2020-11-21 DIAGNOSIS — E11.8 UNCONTROLLED TYPE 2 DIABETES MELLITUS WITH COMPLICATION, WITH LONG-TERM CURRENT USE OF INSULIN (HCC): Primary | ICD-10-CM

## 2020-11-21 DIAGNOSIS — E11.65 UNCONTROLLED TYPE 2 DIABETES MELLITUS WITH COMPLICATION, WITH LONG-TERM CURRENT USE OF INSULIN (HCC): Primary | ICD-10-CM

## 2020-11-21 DIAGNOSIS — Z79.4 UNCONTROLLED TYPE 2 DIABETES MELLITUS WITH COMPLICATION, WITH LONG-TERM CURRENT USE OF INSULIN (HCC): Primary | ICD-10-CM

## 2020-11-23 ENCOUNTER — PATIENT MESSAGE (OUTPATIENT)
Dept: ENDOCRINOLOGY CLINIC | Facility: CLINIC | Age: 60
End: 2020-11-23

## 2020-11-23 NOTE — TELEPHONE ENCOUNTER
Dr. Sandor Estrada,  Patient sent [facility] message:    Samson Scherer been suggesting for years I get on the CGM bandwagon, I'm ready. Do you recommend the Li or Dexcom6 for Medicare pts? Use with Smartphone too? How do I get set-up for this?     Ps- Since

## 2020-11-23 NOTE — TELEPHONE ENCOUNTER
From: Krystyna aDugherty  To: Eduardo Moraes MD  Sent: 11/21/2020 7:45 AM CST  Subject: Visit [facility] Dr Nneka Scherer been suggesting for years I get on the CGM bandwagon, I'm ready. Do you recommend the Li or Dexcom6 for Medicare pts?

## 2020-11-24 RX ORDER — BLOOD SUGAR DIAGNOSTIC
STRIP MISCELLANEOUS
Qty: 300 STRIP | Refills: 1 | Status: SHIPPED | OUTPATIENT
Start: 2020-11-24

## 2020-11-24 NOTE — TELEPHONE ENCOUNTER
RN spoke to [place] again and states Medicare is rejecting the script as it's too soon to refill. Next eligible date for refill is 12/08/20 for which he notified the patient.     RN also called the patient and made sure she received above information, which

## 2020-11-24 NOTE — TELEPHONE ENCOUNTER
Per pharmacist [facility] policy will not bill anything more than 3x/day for insulin dependent patients. He also does not know if Medicare will pay the correct brand of strip as she just received onetouch strips on 11/07/20.   They don't have an override c

## 2020-11-24 NOTE — TELEPHONE ENCOUNTER
From: Valencia Virk  To: Randi Sanabria MD  Sent: 11/23/2020 8:54 PM CST  Subject: Prescription Question    Hi,  I understand this is a holiday week, but since my November 7th hospital discharge, I have been unable to resolve this prescription problem.

## 2020-11-25 ENCOUNTER — PATIENT MESSAGE (OUTPATIENT)
Dept: ENDOCRINOLOGY CLINIC | Facility: CLINIC | Age: 60
End: 2020-11-25

## 2020-11-25 RX ORDER — BLOOD SUGAR DIAGNOSTIC
STRIP MISCELLANEOUS
Qty: 300 STRIP | Refills: 5 | Status: SHIPPED | OUTPATIENT
Start: 2020-11-25

## 2020-11-27 NOTE — TELEPHONE ENCOUNTER
Received [employer] message from [place]:    Patient would like to proceed with the Dexcom G6. Completed [facility] DM supplies order form and placed on MD desk for review. Will fax to [address] [place] [address] Stefani  once completed.

## 2020-11-27 NOTE — TELEPHONE ENCOUNTER
From: Belkys Ford  Sent: 11/25/2020 9:31 AM CST  To: Corrina Urias Clinical Staff  Subject: RE:CGM    Good Morning ,  Yes, let’s proceed with your recommendation of the Dexcom6,  Am hopeful Medicare will help to cover the cost.  In the meantime, I have Jose

## 2020-12-08 ENCOUNTER — APPOINTMENT (OUTPATIENT)
Dept: HEMATOLOGY/ONCOLOGY | Facility: HOSPITAL | Age: 60
End: 2020-12-08
Attending: INTERNAL MEDICINE
Payer: MEDICARE

## 2020-12-09 RX ORDER — LETROZOLE 2.5 MG/1
2.5 TABLET, FILM COATED ORAL DAILY
Qty: 90 TABLET | Refills: 1 | Status: SHIPPED | OUTPATIENT
Start: 2020-12-09 | End: 2021-05-25

## 2020-12-09 RX ORDER — CITALOPRAM HYDROBROMIDE 10 MG/1
TABLET ORAL
Qty: 90 TABLET | Refills: 3 | OUTPATIENT
Start: 2020-12-09

## 2020-12-09 NOTE — TELEPHONE ENCOUNTER
•  CARVEDILOL 12.5 MG Oral Tab, TAKE 1 TABLET 2 (TWO) TIMESDAILY WITH MEALS, Disp: 180 tablet, Rfl: 0

## 2020-12-10 RX ORDER — CARVEDILOL 12.5 MG/1
12.5 TABLET ORAL 2 TIMES DAILY WITH MEALS
Qty: 180 TABLET | Refills: 0 | Status: SHIPPED | OUTPATIENT
Start: 2020-12-10 | End: 2021-03-04

## 2020-12-18 ENCOUNTER — TELEPHONE (OUTPATIENT)
Dept: ENDOCRINOLOGY CLINIC | Facility: CLINIC | Age: 60
End: 2020-12-18

## 2020-12-18 NOTE — TELEPHONE ENCOUNTER
Received Fax from [facility] requesting CMN and recent chart notes in order to send CGM supplies. Completed and sent back.

## 2021-01-12 ENCOUNTER — APPOINTMENT (OUTPATIENT)
Dept: HEMATOLOGY/ONCOLOGY | Facility: HOSPITAL | Age: 61
End: 2021-01-12
Attending: INTERNAL MEDICINE
Payer: MEDICARE

## 2021-01-19 ENCOUNTER — APPOINTMENT (OUTPATIENT)
Dept: HEMATOLOGY/ONCOLOGY | Facility: HOSPITAL | Age: 61
End: 2021-01-19
Attending: INTERNAL MEDICINE
Payer: MEDICARE

## 2021-01-20 ENCOUNTER — OFFICE VISIT (OUTPATIENT)
Dept: HEMATOLOGY/ONCOLOGY | Facility: HOSPITAL | Age: 61
End: 2021-01-20
Attending: INTERNAL MEDICINE
Payer: MEDICARE

## 2021-01-20 VITALS
TEMPERATURE: 99 F | BODY MASS INDEX: 33.12 KG/M2 | SYSTOLIC BLOOD PRESSURE: 136 MMHG | RESPIRATION RATE: 18 BRPM | HEIGHT: 64 IN | OXYGEN SATURATION: 96 % | HEART RATE: 88 BPM | DIASTOLIC BLOOD PRESSURE: 71 MMHG | WEIGHT: 194 LBS

## 2021-01-20 DIAGNOSIS — Z90.49 H/O RESECTION OF LIVER: ICD-10-CM

## 2021-01-20 DIAGNOSIS — G62.0 CHEMOTHERAPY-INDUCED NEUROPATHY (HCC): ICD-10-CM

## 2021-01-20 DIAGNOSIS — C48.2 PRIMARY PERITONEAL CARCINOMATOSIS (HCC): ICD-10-CM

## 2021-01-20 DIAGNOSIS — K55.069 SUPERIOR MESENTERIC VEIN THROMBOSIS (HCC): ICD-10-CM

## 2021-01-20 DIAGNOSIS — C78.6 PERITONEAL CARCINOMATOSIS (HCC): ICD-10-CM

## 2021-01-20 DIAGNOSIS — Z51.11 ENCOUNTER FOR CHEMOTHERAPY MANAGEMENT: Primary | ICD-10-CM

## 2021-01-20 DIAGNOSIS — T45.1X5A CHEMOTHERAPY-INDUCED NEUROPATHY (HCC): ICD-10-CM

## 2021-01-20 DIAGNOSIS — C48.2 MALIGNANT NEOPLASM OF PERITONEUM (HCC): ICD-10-CM

## 2021-01-20 DIAGNOSIS — Z45.2 ENCOUNTER FOR ADJUSTMENT OR MANAGEMENT OF VASCULAR ACCESS DEVICE: Primary | ICD-10-CM

## 2021-01-20 DIAGNOSIS — C56.9 MALIGNANT NEOPLASM OF OVARY, UNSPECIFIED LATERALITY (HCC): ICD-10-CM

## 2021-01-20 LAB
ALBUMIN SERPL-MCNC: 3.4 G/DL (ref 3.4–5)
ALBUMIN/GLOB SERPL: 0.8 {RATIO} (ref 1–2)
ALP LIVER SERPL-CCNC: 94 U/L
ALT SERPL-CCNC: 52 U/L
ANION GAP SERPL CALC-SCNC: 8 MMOL/L (ref 0–18)
AST SERPL-CCNC: 41 U/L (ref 15–37)
BASOPHILS # BLD AUTO: 0.05 X10(3) UL (ref 0–0.2)
BASOPHILS NFR BLD AUTO: 0.7 %
BILIRUB SERPL-MCNC: 1.2 MG/DL (ref 0.1–2)
BUN BLD-MCNC: 13 MG/DL (ref 7–18)
BUN/CREAT SERPL: 15.3 (ref 10–20)
CALCIUM BLD-MCNC: 8.9 MG/DL (ref 8.5–10.1)
CANCER AG125 SERPL-ACNC: 6.6 U/ML (ref ?–35)
CHLORIDE SERPL-SCNC: 101 MMOL/L (ref 98–112)
CO2 SERPL-SCNC: 28 MMOL/L (ref 21–32)
CREAT BLD-MCNC: 0.85 MG/DL
DEPRECATED RDW RBC AUTO: 41.3 FL (ref 35.1–46.3)
EOSINOPHIL # BLD AUTO: 0.2 X10(3) UL (ref 0–0.7)
EOSINOPHIL NFR BLD AUTO: 2.8 %
ERYTHROCYTE [DISTWIDTH] IN BLOOD BY AUTOMATED COUNT: 13.1 % (ref 11–15)
GLOBULIN PLAS-MCNC: 4.2 G/DL (ref 2.8–4.4)
GLUCOSE BLD-MCNC: 215 MG/DL (ref 70–99)
HCT VFR BLD AUTO: 37.3 %
HGB BLD-MCNC: 12.3 G/DL
IMM GRANULOCYTES # BLD AUTO: 0.01 X10(3) UL (ref 0–1)
IMM GRANULOCYTES NFR BLD: 0.1 %
LYMPHOCYTES # BLD AUTO: 1.65 X10(3) UL (ref 1–4)
LYMPHOCYTES NFR BLD AUTO: 23.1 %
M PROTEIN MFR SERPL ELPH: 7.6 G/DL (ref 6.4–8.2)
MCH RBC QN AUTO: 28.5 PG (ref 26–34)
MCHC RBC AUTO-ENTMCNC: 33 G/DL (ref 31–37)
MCV RBC AUTO: 86.5 FL
MONOCYTES # BLD AUTO: 0.36 X10(3) UL (ref 0.1–1)
MONOCYTES NFR BLD AUTO: 5 %
NEUTROPHILS # BLD AUTO: 4.88 X10 (3) UL (ref 1.5–7.7)
NEUTROPHILS # BLD AUTO: 4.88 X10(3) UL (ref 1.5–7.7)
NEUTROPHILS NFR BLD AUTO: 68.3 %
OSMOLALITY SERPL CALC.SUM OF ELEC: 291 MOSM/KG (ref 275–295)
PLATELET # BLD AUTO: 224 10(3)UL (ref 150–450)
POTASSIUM SERPL-SCNC: 3.7 MMOL/L (ref 3.5–5.1)
RBC # BLD AUTO: 4.31 X10(6)UL
SODIUM SERPL-SCNC: 137 MMOL/L (ref 136–145)
WBC # BLD AUTO: 7.2 X10(3) UL (ref 4–11)

## 2021-01-20 PROCEDURE — 85025 COMPLETE CBC W/AUTO DIFF WBC: CPT

## 2021-01-20 PROCEDURE — 99215 OFFICE O/P EST HI 40 MIN: CPT | Performed by: INTERNAL MEDICINE

## 2021-01-20 PROCEDURE — 86304 IMMUNOASSAY TUMOR CA 125: CPT

## 2021-01-20 PROCEDURE — 80053 COMPREHEN METABOLIC PANEL: CPT

## 2021-01-20 PROCEDURE — 36591 DRAW BLOOD OFF VENOUS DEVICE: CPT

## 2021-01-20 RX ORDER — SODIUM CHLORIDE 9 MG/ML
INJECTION INTRAVENOUS
Status: DISCONTINUED
Start: 2021-01-20 | End: 2021-01-20

## 2021-01-20 RX ORDER — HEPARIN SODIUM (PORCINE) LOCK FLUSH IV SOLN 100 UNIT/ML 100 UNIT/ML
5 SOLUTION INTRAVENOUS ONCE
Status: CANCELLED | OUTPATIENT
Start: 2021-01-20

## 2021-01-20 RX ORDER — 0.9 % SODIUM CHLORIDE 0.9 %
10 VIAL (ML) INJECTION ONCE
Status: CANCELLED | OUTPATIENT
Start: 2021-01-20

## 2021-01-20 RX ORDER — HEPARIN SODIUM (PORCINE) LOCK FLUSH IV SOLN 100 UNIT/ML 100 UNIT/ML
5 SOLUTION INTRAVENOUS ONCE
Status: DISCONTINUED | OUTPATIENT
Start: 2021-01-20 | End: 2021-01-20

## 2021-01-20 NOTE — PROGRESS NOTES
Cancer Center Progress Note    Patient Name: Sherrie Avendano   YOB: 1960   Medical Record Number: [id number]   Attending Physician: Coral Mcgregor M.D.      Chief Complaint:  F/u primary peritoneal carcinoma    History of Present Illness:  Aysha months of anticoagulation with rivaroxaban. Thrombus was resolved on subsequent imaging    Given slow local progression she underwent laparotomy, partial hepatectomy segment 3, cholecystectomy on 11/2/2020.  Pathology of liver showed low-grade serous carci Management:   In progress\"   • Type 1 diabetes mellitus ([facility]    • Vertigo    • Visual impairment     glasses for distance and reading       Past Surgical History:  Past Surgical History:   Procedure Laterality Date   • APPENDECTOMY  12/2013   • BREAST BIO Disease Father    • Heart Disorder Father    • Hypertension Father    • Obesity Father    • Psoriasis Cousin         Paternal Cousins - Severe Psoriasis   • Ovarian Cancer Self 54        ovarian        Social History:  Social History    Socioeconomic Histo Concern: Not Asked        Weight Concern: Not Asked        Special Diet: Not Asked        Back Care: Not Asked        Exercise: Not Asked        Bike Helmet: Not Asked        Seat Belt: Not Asked        Self-Exams: Not Asked    Social History Narrative Diarrhea., Disp: , Rfl:   •  diphenoxylate-atropine (LOMOTIL) 2.5-0.025 MG Oral Tab, Take 1 tablet by mouth 4 (four) times daily as needed for Diarrhea., Disp: 360 tablet, Rfl: 1  •  HYDROcodone-acetaminophen  MG Oral Tab, Take 1 tablet by mouth ever bilateral lower quadrant rim calcified fat containing lesions probably reflect fat necrosis or treated lesions.   3. A peripherally calcified dependent pelvic lesion measuring 2.4 x 1.1 cm corresponds to a remote probably fat containing 2.9 x 1.3 cm lesion w/anastomosis.  Partial resection of R diaphragm w/repair and diverting loop ileostomy.  Pathology diffusely showed metastatic papillary serous Carcinoma. She underwent hyperthemic intraperitoneal chemotherapy w/cisplatin and doxorubicin w/ Dr. Rebeca Rodas on 03/ resources were reviewed and discussed with the pateint and family.      Newton Goodman MD

## 2021-01-22 ENCOUNTER — TELEPHONE (OUTPATIENT)
Dept: ENDOCRINOLOGY CLINIC | Facility: CLINIC | Age: 61
End: 2021-01-22

## 2021-01-22 NOTE — TELEPHONE ENCOUNTER
Pt wants to make sure when she checks sugar, readings will show up in download in order to qualify for Dexcom CGM testing 4 times daily. RN advised to keep log of BG readings as a backup.  Pt verbalized understanding and booked [facility] on 2/24/21 for pump downlo

## 2021-02-09 ENCOUNTER — PATIENT MESSAGE (OUTPATIENT)
Dept: ENDOCRINOLOGY CLINIC | Facility: CLINIC | Age: 61
End: 2021-02-09

## 2021-02-10 NOTE — TELEPHONE ENCOUNTER
Spoke to Dr. Blanco Lei and was filled in with the patient's situation. RN responded to patient letting her know to call [phone] when she arrives to the facility.

## 2021-02-10 NOTE — TELEPHONE ENCOUNTER
From: Navneet Gonzalez  To: Afshin Queen MD  Sent: 2/9/2021 7:31 AM CST  Subject: Other    Good Morning,  I have a Nurse visit on Wednesday February 24th to bring my PDM so  my glucose numbers can be retrieved and sent to Medicare in hopes of receiving a CG

## 2021-02-24 ENCOUNTER — NURSE ONLY (OUTPATIENT)
Dept: ENDOCRINOLOGY CLINIC | Facility: CLINIC | Age: 61
End: 2021-02-24
Payer: MEDICARE

## 2021-02-24 ENCOUNTER — PATIENT MESSAGE (OUTPATIENT)
Dept: ENDOCRINOLOGY CLINIC | Facility: CLINIC | Age: 61
End: 2021-02-24

## 2021-02-24 DIAGNOSIS — Z79.4 UNCONTROLLED TYPE 2 DIABETES MELLITUS WITH COMPLICATION, WITH LONG-TERM CURRENT USE OF INSULIN (HCC): Primary | ICD-10-CM

## 2021-02-24 DIAGNOSIS — E11.8 UNCONTROLLED TYPE 2 DIABETES MELLITUS WITH COMPLICATION, WITH LONG-TERM CURRENT USE OF INSULIN (HCC): Primary | ICD-10-CM

## 2021-02-24 DIAGNOSIS — E11.65 UNCONTROLLED TYPE 2 DIABETES MELLITUS WITH COMPLICATION, WITH LONG-TERM CURRENT USE OF INSULIN (HCC): Primary | ICD-10-CM

## 2021-02-24 NOTE — PROGRESS NOTES
Patient with type 2 DM on omnipod pump therapy came in for data download. It was recommended for patient to have CGM, preferably Dexcom.   However, she received a message stating she did not have enough daily glucose testing and suggested to provide a 30 d

## 2021-02-26 NOTE — TELEPHONE ENCOUNTER
Called [facility] to see how we can resume the order that was previously started since office now has the records of her log and pump download. Per Alicia Young, no need to send a new order form and just sent the records to [facility] at [phone].       Log and pum

## 2021-03-03 RX ORDER — CITALOPRAM HYDROBROMIDE 10 MG/1
10 TABLET ORAL DAILY
Qty: 90 TABLET | Refills: 3 | Status: CANCELLED | OUTPATIENT
Start: 2021-03-03

## 2021-03-03 RX ORDER — CITALOPRAM 10 MG/1
10 TABLET ORAL DAILY
Qty: 90 TABLET | Refills: 3 | Status: SHIPPED | OUTPATIENT
Start: 2021-03-03 | End: 2022-02-01

## 2021-03-04 RX ORDER — CARVEDILOL 12.5 MG/1
TABLET ORAL
Qty: 180 TABLET | Refills: 0 | Status: SHIPPED | OUTPATIENT
Start: 2021-03-04 | End: 2021-08-23

## 2021-03-04 RX ORDER — INSULIN ASPART 100 [IU]/ML
INJECTION, SOLUTION INTRAVENOUS; SUBCUTANEOUS
Qty: 180 ML | Refills: 1 | Status: SHIPPED | OUTPATIENT
Start: 2021-03-04 | End: 2021-11-10

## 2021-03-05 ENCOUNTER — TELEPHONE (OUTPATIENT)
Dept: ENDOCRINOLOGY CLINIC | Facility: CLINIC | Age: 61
End: 2021-03-05

## 2021-03-05 NOTE — TELEPHONE ENCOUNTER
Received fax from [facility] requesting we Fax back attached CMN form and relevant chart notes stating that patient check BG 4times daily. Completed and faxed back with LOV, including RN note that explains when patient starting checking her BG 4 times daily.

## 2021-03-05 NOTE — TELEPHONE ENCOUNTER
Received fax from [employer] dated 2/26/21 stating that they received the referral for this patient and are currently working/investigating on patients account and benefits. We should receive another update 2-3 days from 2/26/21. See TE 3/05/21.

## 2021-03-18 DIAGNOSIS — Z23 NEED FOR VACCINATION: ICD-10-CM

## 2021-03-18 NOTE — TELEPHONE ENCOUNTER
Yes, I would still like for the patient to have Dexcom G6. I think [facility] was waiting for her blood glucose logs in the past?  Maybe we should just try resubmitting script? Thanks.

## 2021-03-18 NOTE — TELEPHONE ENCOUNTER
Fax received from [facility] with \"Referral status update\". Status: Cancelled. Cancellation reason: Medicare Non Compliant. Called patient to see if she received an update. Patient reports she has not received any information regarding dexcom G6.  Demetra

## 2021-03-30 ENCOUNTER — APPOINTMENT (OUTPATIENT)
Dept: HEMATOLOGY/ONCOLOGY | Facility: HOSPITAL | Age: 61
End: 2021-03-30
Attending: INTERNAL MEDICINE
Payer: MEDICARE

## 2021-04-01 RX ORDER — INSULIN PUMP CART,CONT INF,RF
CARTRIDGE (EA) SUBCUTANEOUS
Qty: 45 EACH | Refills: 0 | Status: SHIPPED | OUTPATIENT
Start: 2021-04-01 | End: 2021-09-08

## 2021-04-01 NOTE — TELEPHONE ENCOUNTER
LOV 10/2020. Letter sent reminding patient to schedule follow up. Refilled x 1 month per MD protocol.

## 2021-04-01 NOTE — TELEPHONE ENCOUNTER
Spoke with [employer] representative who advised the reason it was canceled was because chart notes that were submitted are old. Needs chart notes within 6 months. First available appointment is 5/27. Please advise if okay to double book sooner apt.

## 2021-04-05 NOTE — TELEPHONE ENCOUNTER
I can't do later appointment on Wednesday. But since virtual would she like to do Thursday 4/8 at 6:15? Thanks.

## 2021-04-05 NOTE — TELEPHONE ENCOUNTER
Called and scheduled VV for 8:45 am on 4/14/21.     Patient would like to know if it can be done at a later time due to he insomnia and not falling asleep until 6:30am.

## 2021-04-06 ENCOUNTER — APPOINTMENT (OUTPATIENT)
Dept: HEMATOLOGY/ONCOLOGY | Facility: HOSPITAL | Age: 61
End: 2021-04-06
Attending: INTERNAL MEDICINE
Payer: MEDICARE

## 2021-04-08 NOTE — TELEPHONE ENCOUNTER
Dr Isis Chavez I just called patient to offer titus but she already booked another titus at 600 today.   Asking if you can do next week later in day   4/15 , or 4/16 thanks

## 2021-04-09 ENCOUNTER — APPOINTMENT (OUTPATIENT)
Dept: HEMATOLOGY/ONCOLOGY | Facility: HOSPITAL | Age: 61
End: 2021-04-09
Attending: INTERNAL MEDICINE
Payer: MEDICARE

## 2021-04-20 ENCOUNTER — PATIENT MESSAGE (OUTPATIENT)
Dept: ENDOCRINOLOGY CLINIC | Facility: CLINIC | Age: 61
End: 2021-04-20

## 2021-04-21 NOTE — TELEPHONE ENCOUNTER
From: Maribell Brown  To: Perla Rock MD  Sent: 4/20/2021 8:22 PM CDT  Subject: Other    Hi,  I scheduled a Friday, April 23rd \"video visit \" with Dr Rah De Leon. My appointment reminder states that it's \"In-office? \"  Does this mean I need to re-schedule?

## 2021-04-23 ENCOUNTER — TELEMEDICINE (OUTPATIENT)
Dept: ENDOCRINOLOGY CLINIC | Facility: CLINIC | Age: 61
End: 2021-04-23

## 2021-04-23 DIAGNOSIS — E11.65 UNCONTROLLED TYPE 2 DIABETES MELLITUS WITH HYPERGLYCEMIA (HCC): Primary | ICD-10-CM

## 2021-04-23 PROCEDURE — 99213 OFFICE O/P EST LOW 20 MIN: CPT | Performed by: INTERNAL MEDICINE

## 2021-04-23 NOTE — PROGRESS NOTES
Please note that the following visit was completed using two-way, real-time interactive audio and video communication.   This has been done in good ana cristina to provide continuity of care in the best interest of the provider-patient relationship, due to the kade Does not apply Misc, USE 1 POD EVERY OTHER DAY, Disp: 45 each, Rfl: 0  •  CARVEDILOL 12.5 MG Oral Tab, TAKE 1 TABLET BY MOUTH TWICE A DAY WITH MEALS, Disp: 180 tablet, Rfl: 0  •  insulin aspart (NOVOLOG) 100 UNIT/ML Subcutaneous Solution, INJECT VIA INSULI RASH, ITCHING    Comment:PLEASE USE MEPORE DRESSING AND A 3M STERI STRIP TO             SECURE PORT. Pt itches, might rash with tegaderm             and strips inside port kit.     Social History:   Social History    Socioeconomic History      Marital s WIRE 1 SITE LEFT (CPT=19281)     • OTHER SURGICAL HISTORY  03/31/2015    Cytoreductive surgery with peritonectomy,   En bloc small bowel resection, partial colectomy, partial cystectomy, partial resection of abdominal wall (8 cm), and omentectomy,  Small b

## 2021-04-27 ENCOUNTER — OFFICE VISIT (OUTPATIENT)
Dept: HEMATOLOGY/ONCOLOGY | Facility: HOSPITAL | Age: 61
End: 2021-04-27
Attending: INTERNAL MEDICINE
Payer: MEDICARE

## 2021-04-27 VITALS
DIASTOLIC BLOOD PRESSURE: 65 MMHG | TEMPERATURE: 97 F | OXYGEN SATURATION: 95 % | BODY MASS INDEX: 34.31 KG/M2 | SYSTOLIC BLOOD PRESSURE: 115 MMHG | WEIGHT: 201 LBS | HEART RATE: 89 BPM | HEIGHT: 64 IN | RESPIRATION RATE: 18 BRPM

## 2021-04-27 DIAGNOSIS — C48.2 MALIGNANT NEOPLASM OF PERITONEUM (HCC): ICD-10-CM

## 2021-04-27 DIAGNOSIS — C80.0 CARCINOMATOSIS (HCC): ICD-10-CM

## 2021-04-27 DIAGNOSIS — E11.65 UNCONTROLLED TYPE 2 DIABETES MELLITUS WITH HYPERGLYCEMIA (HCC): ICD-10-CM

## 2021-04-27 DIAGNOSIS — K55.069 SUPERIOR MESENTERIC VEIN THROMBOSIS (HCC): Primary | ICD-10-CM

## 2021-04-27 DIAGNOSIS — Z45.2 ENCOUNTER FOR ADJUSTMENT OR MANAGEMENT OF VASCULAR ACCESS DEVICE: Primary | ICD-10-CM

## 2021-04-27 DIAGNOSIS — C56.9 MALIGNANT NEOPLASM OF OVARY, UNSPECIFIED LATERALITY (HCC): ICD-10-CM

## 2021-04-27 DIAGNOSIS — Z51.81 MEDICATION MONITORING ENCOUNTER: ICD-10-CM

## 2021-04-27 PROCEDURE — 99215 OFFICE O/P EST HI 40 MIN: CPT | Performed by: INTERNAL MEDICINE

## 2021-04-27 PROCEDURE — 85025 COMPLETE CBC W/AUTO DIFF WBC: CPT

## 2021-04-27 PROCEDURE — 83735 ASSAY OF MAGNESIUM: CPT

## 2021-04-27 PROCEDURE — 83036 HEMOGLOBIN GLYCOSYLATED A1C: CPT

## 2021-04-27 PROCEDURE — 86304 IMMUNOASSAY TUMOR CA 125: CPT

## 2021-04-27 PROCEDURE — 36591 DRAW BLOOD OFF VENOUS DEVICE: CPT

## 2021-04-27 PROCEDURE — 80053 COMPREHEN METABOLIC PANEL: CPT

## 2021-04-27 RX ORDER — HEPARIN SODIUM (PORCINE) LOCK FLUSH IV SOLN 100 UNIT/ML 100 UNIT/ML
5 SOLUTION INTRAVENOUS ONCE
Status: COMPLETED | OUTPATIENT
Start: 2021-04-27 | End: 2021-04-27

## 2021-04-27 RX ORDER — SODIUM CHLORIDE 9 MG/ML
INJECTION INTRAVENOUS
Status: DISCONTINUED
Start: 2021-04-27 | End: 2021-04-27

## 2021-04-27 RX ORDER — 0.9 % SODIUM CHLORIDE 0.9 %
10 VIAL (ML) INJECTION ONCE
Status: CANCELLED | OUTPATIENT
Start: 2021-04-27

## 2021-04-27 RX ORDER — HEPARIN SODIUM (PORCINE) LOCK FLUSH IV SOLN 100 UNIT/ML 100 UNIT/ML
5 SOLUTION INTRAVENOUS ONCE
Status: CANCELLED | OUTPATIENT
Start: 2021-04-27

## 2021-04-27 RX ADMIN — HEPARIN SODIUM (PORCINE) LOCK FLUSH IV SOLN 100 UNIT/ML 500 UNITS: 100 SOLUTION INTRAVENOUS at 13:03:00

## 2021-04-27 NOTE — PROGRESS NOTES
Cancer Center Progress Note    Patient Name: Camron De León   YOB: 1960   Medical Record Number: [id number]   Attending Physician: Elena Staton M.D.      Chief Complaint:  F/u primary peritoneal carcinoma    History of Present Illness:  Aysha months of anticoagulation with rivaroxaban. Thrombus was resolved on subsequent imaging    Given slow local progression she underwent laparotomy, partial hepatectomy segment 3, cholecystectomy on 11/2/2020.  Pathology of liver showed low-grade serous carci Management:   In progress\"   • [facility] ([facility]    • Vertigo    • Visual impairment     glasses for distance and reading       Past Surgical History:  Past Surgical History:   Procedure Laterality Date   • APPENDECTOMY  12/2013   • BREAST BIO years: 15        Types: Cigarettes        Quit date: 1988        Years since quittin.3      Smokeless tobacco: Never Used    Vaping Use      Vaping Use: Never used    Substance and Sexual Activity      Alcohol use: No      Drug use: No      Sexual insulin aspart (NOVOLOG) 100 UNIT/ML Subcutaneous Solution, INJECT VIA INSULIN PUMP, MAXIMUM  UNITS DAILY, Disp: 180 mL, Rfl: 1  •  Citalopram Hydrobromide 10 MG Oral Tab, Take 1 tablet (10 mg total) by mouth daily. , Disp: 90 tablet, Rfl: 3  •  letro SpO2 95%   BMI 34.50 kg/m²     Physical Examination:  General: Patient is alert and oriented x 3, not in acute distress. Psych:  Mood and affect appropriate  HEENT: EOMs intact. PERRL. Oropharynx is clear. Neck: No JVD. No palpable lymphadenopathy.  Nec protruding small bowel. 11. Post hysterectomy. 12. No evidence of metastatic disease in the chest.  13. Atherosclerotic vascular calcification including multi vessel coronary artery calcification.                 Impression and Plan:  64 y/o woman metasta stable on letrozole for over 3 years . Given slow local progression she underwent laparotomy, partial hepatectomy segment 3, cholecystectomy on 11/2/2020.  Pathology of liver showed low-grade serous carcinoma, measuring 3.3 cm in greatest dimension, involv

## 2021-04-28 RX ORDER — ATORVASTATIN CALCIUM 40 MG/1
TABLET, FILM COATED ORAL
Qty: 90 TABLET | Refills: 1 | Status: SHIPPED | OUTPATIENT
Start: 2021-04-28 | End: 2021-11-03

## 2021-05-25 RX ORDER — LETROZOLE 2.5 MG/1
TABLET, FILM COATED ORAL
Qty: 90 TABLET | Refills: 1 | Status: SHIPPED | OUTPATIENT
Start: 2021-05-25 | End: 2021-11-03

## 2021-06-01 ENCOUNTER — PATIENT MESSAGE (OUTPATIENT)
Dept: ENDOCRINOLOGY CLINIC | Facility: CLINIC | Age: 61
End: 2021-06-01

## 2021-06-02 ENCOUNTER — TELEPHONE (OUTPATIENT)
Dept: ENDOCRINOLOGY | Facility: HOSPITAL | Age: 61
End: 2021-06-02

## 2021-06-02 NOTE — TELEPHONE ENCOUNTER
Patient received a new PDM and she was trying to set up her settings. RN was able to walk her through the basal settings that is noted on LOV 4/23/21.   Unable to assist her with the rest of the settings (I:CR) as RN was not familiar with prompts she was se

## 2021-06-02 NOTE — TELEPHONE ENCOUNTER
Patient requesting to speak with nurse regarding setting up her device concerned that her device will not work and get insulin.

## 2021-06-02 NOTE — TELEPHONE ENCOUNTER
Contacted Niko Meza to assist with new PDM set up. Settings transferred from MD note on 4/23/21. Patient requested target BG to be changed to 140 mg/dl. All settings reviewed and confirmed. All questions answered.

## 2021-06-28 ENCOUNTER — TELEPHONE (OUTPATIENT)
Dept: ENDOCRINOLOGY CLINIC | Facility: CLINIC | Age: 61
End: 2021-06-28

## 2021-06-28 NOTE — TELEPHONE ENCOUNTER
Prior Authorization request for:      •  OMNIPOD 5 PACK Does not apply Misc, USE 1 POD EVERY OTHER DAY, Disp: 45 each, Rfl: 0    MESSAGE:  The plan members' benefit plan requires prior authorization  For certain medications.  To make an appropriate determin

## 2021-06-29 NOTE — TELEPHONE ENCOUNTER
Medication PA Requested: OMNIPOD 5 PACK                                                          CoverMyMeds Used:  Key:  Sig: USE 1 POD EVERY OTHER DAY   DX Code: E11.65                                    CPT code (if applicable):   Case Number/Pending Re

## 2021-06-29 NOTE — TELEPHONE ENCOUNTER
Medication PA Requested: OMNIPOD 5 PACK                                                          CoverMyMeds Used:  Key:  [id number] – PA Case ID: [id number]  Sig: USE 1 POD EVERY OTHER DAY   DX Code: E11.65               Called [facility] [phone], s

## 2021-06-29 NOTE — TELEPHONE ENCOUNTER
Medication PA Requested: OMNIPOD 5 PACK                                                          CoverMyMeds Used:  Key:  [id number] – PA Case ID: [id number]  Sig: USE 1 POD EVERY OTHER DAY   DX Code: E11.65            My chart message sent to patient of ap

## 2021-07-19 DIAGNOSIS — C56.9 MALIGNANT NEOPLASM OF OVARY, UNSPECIFIED LATERALITY (HCC): Primary | ICD-10-CM

## 2021-07-20 ENCOUNTER — APPOINTMENT (OUTPATIENT)
Dept: HEMATOLOGY/ONCOLOGY | Facility: HOSPITAL | Age: 61
End: 2021-07-20
Attending: INTERNAL MEDICINE
Payer: MEDICARE

## 2021-07-20 ENCOUNTER — HOSPITAL ENCOUNTER (OUTPATIENT)
Dept: CT IMAGING | Facility: HOSPITAL | Age: 61
Discharge: HOME | End: 2021-07-20
Attending: INTERNAL MEDICINE
Payer: MEDICARE

## 2021-07-20 DIAGNOSIS — C56.9 MALIGNANT NEOPLASM OF OVARY, UNSPECIFIED LATERALITY (HCC): ICD-10-CM

## 2021-07-20 DIAGNOSIS — C80.0 CARCINOMATOSIS (HCC): ICD-10-CM

## 2021-07-20 DIAGNOSIS — K55.069 SUPERIOR MESENTERIC VEIN THROMBOSIS (HCC): ICD-10-CM

## 2021-07-20 LAB
ALBUMIN SERPL-MCNC: 3.7 G/DL (ref 3.4–5)
ALBUMIN/GLOB SERPL: 1 {RATIO} (ref 1–2)
ALP LIVER SERPL-CCNC: 94 U/L
ALT SERPL-CCNC: 70 U/L
ANION GAP SERPL CALC-SCNC: 8 MMOL/L (ref 0–18)
AST SERPL-CCNC: 53 U/L (ref 15–37)
BASOPHILS # BLD AUTO: 0.04 X10(3) UL (ref 0–0.2)
BASOPHILS NFR BLD AUTO: 0.7 %
BILIRUB SERPL-MCNC: 1.8 MG/DL (ref 0.1–2)
BUN BLD-MCNC: 10 MG/DL (ref 7–18)
BUN/CREAT SERPL: 12.3 (ref 10–20)
CALCIUM BLD-MCNC: 8.5 MG/DL (ref 8.5–10.1)
CANCER AG125 SERPL-ACNC: 7.4 U/ML (ref ?–35)
CHLORIDE SERPL-SCNC: 103 MMOL/L (ref 98–112)
CO2 SERPL-SCNC: 27 MMOL/L (ref 21–32)
CREAT BLD-MCNC: 0.6 MG/DL
CREAT BLD-MCNC: 0.81 MG/DL
DEPRECATED RDW RBC AUTO: 40 FL (ref 35.1–46.3)
EOSINOPHIL # BLD AUTO: 0.21 X10(3) UL (ref 0–0.7)
EOSINOPHIL NFR BLD AUTO: 3.5 %
ERYTHROCYTE [DISTWIDTH] IN BLOOD BY AUTOMATED COUNT: 12.8 % (ref 11–15)
GLOBULIN PLAS-MCNC: 3.8 G/DL (ref 2.8–4.4)
GLUCOSE BLD-MCNC: 237 MG/DL (ref 70–99)
HCT VFR BLD AUTO: 39.6 %
HGB BLD-MCNC: 13.3 G/DL
IMM GRANULOCYTES # BLD AUTO: 0.02 X10(3) UL (ref 0–1)
IMM GRANULOCYTES NFR BLD: 0.3 %
LYMPHOCYTES # BLD AUTO: 1.62 X10(3) UL (ref 1–4)
LYMPHOCYTES NFR BLD AUTO: 27.2 %
M PROTEIN MFR SERPL ELPH: 7.5 G/DL (ref 6.4–8.2)
MCH RBC QN AUTO: 28.9 PG (ref 26–34)
MCHC RBC AUTO-ENTMCNC: 33.6 G/DL (ref 31–37)
MCV RBC AUTO: 86.1 FL
MONOCYTES # BLD AUTO: 0.34 X10(3) UL (ref 0.1–1)
MONOCYTES NFR BLD AUTO: 5.7 %
NEUTROPHILS # BLD AUTO: 3.72 X10 (3) UL (ref 1.5–7.7)
NEUTROPHILS # BLD AUTO: 3.72 X10(3) UL (ref 1.5–7.7)
NEUTROPHILS NFR BLD AUTO: 62.6 %
OSMOLALITY SERPL CALC.SUM OF ELEC: 293 MOSM/KG (ref 275–295)
PLATELET # BLD AUTO: 178 10(3)UL (ref 150–450)
POTASSIUM SERPL-SCNC: 3.7 MMOL/L (ref 3.5–5.1)
RBC # BLD AUTO: 4.6 X10(6)UL
SODIUM SERPL-SCNC: 138 MMOL/L (ref 136–145)
WBC # BLD AUTO: 6 X10(3) UL (ref 4–11)

## 2021-07-20 PROCEDURE — 71260 CT THORAX DX C+: CPT | Performed by: INTERNAL MEDICINE

## 2021-07-20 PROCEDURE — 86304 IMMUNOASSAY TUMOR CA 125: CPT

## 2021-07-20 PROCEDURE — 82565 ASSAY OF CREATININE: CPT

## 2021-07-20 PROCEDURE — 74177 CT ABD & PELVIS W/CONTRAST: CPT | Performed by: INTERNAL MEDICINE

## 2021-07-20 PROCEDURE — 36591 DRAW BLOOD OFF VENOUS DEVICE: CPT

## 2021-07-20 PROCEDURE — 80053 COMPREHEN METABOLIC PANEL: CPT

## 2021-07-20 PROCEDURE — 85025 COMPLETE CBC W/AUTO DIFF WBC: CPT

## 2021-07-20 RX ORDER — SODIUM CHLORIDE 9 MG/ML
INJECTION INTRAVENOUS
Status: DISCONTINUED
Start: 2021-07-20 | End: 2021-07-20

## 2021-07-27 ENCOUNTER — OFFICE VISIT (OUTPATIENT)
Dept: HEMATOLOGY/ONCOLOGY | Facility: HOSPITAL | Age: 61
End: 2021-07-27
Attending: INTERNAL MEDICINE
Payer: MEDICARE

## 2021-07-27 VITALS
WEIGHT: 201 LBS | HEART RATE: 76 BPM | TEMPERATURE: 99 F | BODY MASS INDEX: 34.31 KG/M2 | HEIGHT: 64 IN | RESPIRATION RATE: 18 BRPM | OXYGEN SATURATION: 95 % | DIASTOLIC BLOOD PRESSURE: 69 MMHG | SYSTOLIC BLOOD PRESSURE: 129 MMHG

## 2021-07-27 DIAGNOSIS — K55.069 SUPERIOR MESENTERIC VEIN THROMBOSIS (HCC): ICD-10-CM

## 2021-07-27 DIAGNOSIS — E83.42 HYPOMAGNESEMIA: ICD-10-CM

## 2021-07-27 DIAGNOSIS — C56.9 MALIGNANT NEOPLASM OF OVARY, UNSPECIFIED LATERALITY (HCC): Primary | ICD-10-CM

## 2021-07-27 DIAGNOSIS — Z51.11 ENCOUNTER FOR CHEMOTHERAPY MANAGEMENT: ICD-10-CM

## 2021-07-27 PROCEDURE — 99215 OFFICE O/P EST HI 40 MIN: CPT | Performed by: INTERNAL MEDICINE

## 2021-07-27 NOTE — PROGRESS NOTES
Cancer Center Progress Note    Patient Name: Luis Mock   YOB: 1960   Medical Record Number: [id number]   Attending Physician: Heriberto Ospina M.D.      Chief Complaint:  F/u primary peritoneal carcinoma    History of Present Illness:  Aysha months of anticoagulation with rivaroxaban. Thrombus was resolved on subsequent imaging    Given slow local progression she underwent laparotomy, partial hepatectomy segment 3, cholecystectomy on 11/2/2020.  Pathology of liver showed low-grade serous carci Management:   In progress\"   • Type 1 diabetes mellitus ([facility]    • Vertigo    • Visual impairment     glasses for distance and reading       Past Surgical History:  Past Surgical History:   Procedure Laterality Date   • APPENDECTOMY  12/2013   • BREAST BIO years: 15        Types: Cigarettes        Quit date: 1988        Years since quittin.5      Smokeless tobacco: Never Used    Vaping Use      Vaping Use: Never used    Substance and Sexual Activity      Alcohol use: No      Drug use: No      Sexual OMNIPOD 5 PACK Does not apply Misc, USE 1 POD EVERY OTHER DAY, Disp: 45 each, Rfl: 0  •  CARVEDILOL 12.5 MG Oral Tab, TAKE 1 TABLET BY MOUTH TWICE A DAY WITH MEALS, Disp: 180 tablet, Rfl: 0  •  insulin aspart (NOVOLOG) 100 UNIT/ML Subcutaneous Solution, IN BMI 34.50 kg/m²     Physical Examination:  General: Patient is alert and oriented x 3, not in acute distress. Psych:  Mood and affect appropriate  HEENT: EOMs intact. PERRL. Oropharynx is clear. Neck: No JVD. No palpable lymphadenopathy.  Neck is supple diaphragm w/repair and diverting loop ileostomy.  Pathology diffusely showed metastatic papillary serous Carcinoma. She underwent hyperthemic intraperitoneal chemotherapy w/cisplatin and doxorubicin w/ Dr. Manuela Ruiz on 03/31/2015.  On 7/21/15, patient had rever monitoring    The patient's emotional well being was assessed and resources were discussed. Appropriate resources were reviewed and discussed with the pateint and family.      Paris Paz MD

## 2021-08-01 ENCOUNTER — PATIENT MESSAGE (OUTPATIENT)
Dept: ENDOCRINOLOGY CLINIC | Facility: CLINIC | Age: 61
End: 2021-08-01

## 2021-08-02 RX ORDER — INSULIN PUMP CART,CONT INF,RF
1 CARTRIDGE (EA) SUBCUTANEOUS EVERY OTHER DAY
Qty: 45 EACH | Refills: 0 | Status: SHIPPED | OUTPATIENT
Start: 2021-08-02

## 2021-08-02 NOTE — TELEPHONE ENCOUNTER
Spoke to [facility], who could not find patient in their system. Representative told me to send in new script to be processed. Pended script for pods for provider. LR 4/1/21.

## 2021-08-02 NOTE — TELEPHONE ENCOUNTER
From: Vera Balderas  To: Octavio Conteh MD  Sent: 8/1/2021 10:16 PM CDT  Subject: Non-Urgent Medical Question    Hello,  I received this from [facility]/Medicare,  Are they suggesting I get an \"exception form\" to help with cost  of the Pods for my pump?    Thank

## 2021-08-09 ENCOUNTER — PATIENT MESSAGE (OUTPATIENT)
Dept: ENDOCRINOLOGY CLINIC | Facility: CLINIC | Age: 61
End: 2021-08-09

## 2021-08-11 NOTE — TELEPHONE ENCOUNTER
Per TE of 9/29/21 PA approved. Called [employer] [phone], spoke with Jami. Inquired if way to lower cost of Omnipod 5 Pack. He states was filled 6/30/21.   States patient needs to contact her benefit plan (states is [employer]) and speak to t

## 2021-08-11 NOTE — TELEPHONE ENCOUNTER
From: Ankit Bartlett  To: Silviano Da Silva MD  Sent: 8/9/2021 6:10 PM CDT  Subject: Visit Follow-up Question    Hello,  Any information from my email message I sent 8-1 regarding my Omnipod? I have not heard back as of yet.     Thanks,  Parth Méndez

## 2021-08-11 NOTE — TELEPHONE ENCOUNTER
Medication PA Requested: Insulin Disposable Pump (OMNIPOD 5 PACK) Does not apply Misc Quantity: 45 each Rfls: 0                                                        CoverMyMeds Used:  Key:  Si each by Does not apply route every other day.   DX Code: E

## 2021-08-23 RX ORDER — CARVEDILOL 12.5 MG/1
TABLET ORAL
Qty: 180 TABLET | Refills: 0 | Status: SHIPPED | OUTPATIENT
Start: 2021-08-23 | End: 2021-11-03

## 2021-09-08 RX ORDER — INSULIN PUMP CART,CONT INF,RF
CARTRIDGE (EA) SUBCUTANEOUS
Qty: 15 EACH | Refills: 2 | Status: SHIPPED | OUTPATIENT
Start: 2021-09-08

## 2021-10-19 ENCOUNTER — PATIENT MESSAGE (OUTPATIENT)
Dept: ENDOCRINOLOGY CLINIC | Facility: CLINIC | Age: 61
End: 2021-10-19

## 2021-10-19 ENCOUNTER — TELEPHONE (OUTPATIENT)
Dept: ENDOCRINOLOGY CLINIC | Facility: CLINIC | Age: 61
End: 2021-10-19

## 2021-10-19 NOTE — TELEPHONE ENCOUNTER
From: Ellen Mcbride  To: Marci Ballard MD  Sent: 10/19/2021 2:15 PM CDT  Subject: Dexcom    Also,   Does the Dash work without the [employer]?

## 2021-10-19 NOTE — TELEPHONE ENCOUNTER
Dr. Jamshid Verduzco -- please advise on below. Pt also sent another message asking if Elle Blount works with [employer]. I know this is something that is in the works per [employer] rep.

## 2021-10-19 NOTE — TELEPHONE ENCOUNTER
From: Ankit Bartlett  To: Silviano Da Silva MD  Sent: 10/19/2021 2:14 PM CDT  Subject: Maxwell Mccune Dr Mendel Downy,  Would you recommend Dash technology for me? Does Medicare covers the pods or are they even higher priced than the regular omnipods?

## 2021-10-19 NOTE — TELEPHONE ENCOUNTER
Received prescription fax from [address] Completed form and placed on providers desk to review and sign.

## 2021-10-26 ENCOUNTER — NURSE ONLY (OUTPATIENT)
Dept: HEMATOLOGY/ONCOLOGY | Facility: HOSPITAL | Age: 61
End: 2021-10-26
Attending: INTERNAL MEDICINE
Payer: MEDICARE

## 2021-10-26 VITALS
BODY MASS INDEX: 34.83 KG/M2 | HEIGHT: 64 IN | WEIGHT: 204 LBS | RESPIRATION RATE: 18 BRPM | TEMPERATURE: 99 F | DIASTOLIC BLOOD PRESSURE: 74 MMHG | OXYGEN SATURATION: 97 % | HEART RATE: 75 BPM | SYSTOLIC BLOOD PRESSURE: 139 MMHG

## 2021-10-26 DIAGNOSIS — C48.2 MALIGNANT NEOPLASM OF PERITONEUM (HCC): ICD-10-CM

## 2021-10-26 DIAGNOSIS — Z51.11 ENCOUNTER FOR CHEMOTHERAPY MANAGEMENT: ICD-10-CM

## 2021-10-26 DIAGNOSIS — C78.6 PERITONEAL CARCINOMATOSIS (HCC): ICD-10-CM

## 2021-10-26 DIAGNOSIS — C56.9 MALIGNANT NEOPLASM OF OVARY, UNSPECIFIED LATERALITY (HCC): Primary | ICD-10-CM

## 2021-10-26 DIAGNOSIS — Z90.49 H/O RESECTION OF LIVER: ICD-10-CM

## 2021-10-26 PROCEDURE — 86304 IMMUNOASSAY TUMOR CA 125: CPT

## 2021-10-26 PROCEDURE — 80053 COMPREHEN METABOLIC PANEL: CPT

## 2021-10-26 PROCEDURE — 36591 DRAW BLOOD OFF VENOUS DEVICE: CPT

## 2021-10-26 PROCEDURE — 85025 COMPLETE CBC W/AUTO DIFF WBC: CPT

## 2021-10-26 PROCEDURE — 99215 OFFICE O/P EST HI 40 MIN: CPT | Performed by: INTERNAL MEDICINE

## 2021-10-26 RX ORDER — SODIUM CHLORIDE 9 MG/ML
INJECTION INTRAVENOUS
Status: DISCONTINUED
Start: 2021-10-26 | End: 2021-10-26

## 2021-10-26 NOTE — PROGRESS NOTES
Cancer Center Progress Note    Patient Name: Maribell Brown   YOB: 1960   Medical Record Number: [id number]   Attending Physician: Perez Otto M.D.      Chief Complaint:  F/u primary peritoneal carcinoma    History of Present Illness:  Aysha months of anticoagulation with rivaroxaban. Thrombus was resolved on subsequent imaging    Given slow local progression she underwent laparotomy, partial hepatectomy segment 3, cholecystectomy on 11/2/2020.  Pathology of liver showed low-grade serous carci Management:   In progress\"   • Type 1 diabetes mellitus ([facility]    • Vertigo    • Visual impairment     glasses for distance and reading       Past Surgical History:  Past Surgical History:   Procedure Laterality Date   • APPENDECTOMY  12/2013   • BREAST BIO years: 15        Types: Cigarettes        Quit date: 1988        Years since quittin.8      Smokeless tobacco: Never Used    Vaping Use      Vaping Use: Never used    Substance and Sexual Activity      Alcohol use: No      Drug use: No      Sexual Year: Not on file      Number of Places Lived in the Last Year: Not on file      Unstable Housing in the Last Year: Not on file      Current Medications:    Current Outpatient Medications:   •  OMNIPOD 5 PACK Does not apply Misc, USE 1 POD EVERY OTHER DAY, Diarrhea., Disp: 360 tablet, Rfl: 1    Allergies:    Adhesive Tape           RASH, ITCHING    Comment:PLEASE USE MEPORE DRESSING AND A 3M STERI STRIP TO             SECURE PORT. Pt itches, might rash with tegaderm             and strips inside port kit. for a low-grade serous carcinoma staining positive for estrogen receptor  --Treated by Dr. Gary Johnson and is s/p 6 cycles of carbo/taxol (completed 8/2014) per recommendation of [facility]. --A CT on 1/15/15 revealed new omental/mesenteric tumors:  In March 2015 she un letrozole. Monitor for now. 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 Resolved on imaging.   Given she has comp

## 2021-10-28 ENCOUNTER — TELEPHONE (OUTPATIENT)
Dept: ENDOCRINOLOGY CLINIC | Facility: CLINIC | Age: 61
End: 2021-10-28

## 2021-10-28 NOTE — TELEPHONE ENCOUNTER
Deven Degree with [employer] calling to confirm fax sent was received, requesting to complete and send back. Please call at [phone] option 2,thanks.   [phone]

## 2021-11-03 RX ORDER — CARVEDILOL 12.5 MG/1
TABLET ORAL
Qty: 180 TABLET | Refills: 0 | Status: SHIPPED | OUTPATIENT
Start: 2021-11-03 | End: 2022-02-01

## 2021-11-03 RX ORDER — LETROZOLE 2.5 MG/1
TABLET, FILM COATED ORAL
Qty: 90 TABLET | Refills: 1 | Status: SHIPPED | OUTPATIENT
Start: 2021-11-03 | End: 2022-02-01

## 2021-11-03 RX ORDER — ATORVASTATIN CALCIUM 40 MG/1
TABLET, FILM COATED ORAL
Qty: 90 TABLET | Refills: 0 | Status: SHIPPED | OUTPATIENT
Start: 2021-11-03 | End: 2022-02-01

## 2021-11-05 NOTE — TELEPHONE ENCOUNTER
Sisi Byrd from [facility] is calling to confirm the fax they sent over on 11-3 was received.  Please refer to [facility] message from 10-21

## 2021-11-10 ENCOUNTER — OFFICE VISIT (OUTPATIENT)
Dept: ENDOCRINOLOGY CLINIC | Facility: CLINIC | Age: 61
End: 2021-11-10
Payer: MEDICARE

## 2021-11-10 VITALS
BODY MASS INDEX: 35 KG/M2 | WEIGHT: 205.63 LBS | HEART RATE: 79 BPM | DIASTOLIC BLOOD PRESSURE: 81 MMHG | SYSTOLIC BLOOD PRESSURE: 147 MMHG

## 2021-11-10 DIAGNOSIS — E11.65 UNCONTROLLED TYPE 2 DIABETES MELLITUS WITH HYPERGLYCEMIA (HCC): Primary | ICD-10-CM

## 2021-11-10 PROCEDURE — 99214 OFFICE O/P EST MOD 30 MIN: CPT | Performed by: NURSE PRACTITIONER

## 2021-11-10 PROCEDURE — 82947 ASSAY GLUCOSE BLOOD QUANT: CPT | Performed by: NURSE PRACTITIONER

## 2021-11-10 PROCEDURE — 83036 HEMOGLOBIN GLYCOSYLATED A1C: CPT | Performed by: NURSE PRACTITIONER

## 2021-11-10 PROCEDURE — 36416 COLLJ CAPILLARY BLOOD SPEC: CPT | Performed by: NURSE PRACTITIONER

## 2021-11-10 RX ORDER — INSULIN ASPART 100 [IU]/ML
INJECTION, SOLUTION INTRAVENOUS; SUBCUTANEOUS
Qty: 180 ML | Refills: 1 | Status: SHIPPED | OUTPATIENT
Start: 2021-11-10

## 2021-11-10 NOTE — PATIENT INSTRUCTIONS
A1C: 7.9% today --> decreased from 9.0% on 10/2020  Blood glucose: 155 in clinic today    Medications:   Omnipod     Basal:  12A 1.7  5A 2.0  9A 1.9    I:CR 3  Sensitivity 20  Active Insulin Time 4 hours    A1C goal:  <7.0%    Blood sugar testing:  Test yo

## 2021-11-10 NOTE — PROGRESS NOTES
Name: Adryan Velasquez  Date: 11/10/2021        Referring Physician: No ref. provider found       HISTORY OF PRESENT ILLNESS   Adryan Velasquez is a 57year old female who presents for diabetes mellitus.   She is now maintained on letrozole for ovarian metast UNIT/ML Subcutaneous Solution, INJECT VIA INSULIN PUMP, MAXIMUM  UNITS DAILY, Disp: 180 mL, Rfl: 1  •  Citalopram Hydrobromide 10 MG Oral Tab, Take 1 tablet (10 mg total) by mouth daily. , Disp: 90 tablet, Rfl: 3  •  Glucose Blood (FREESTYLE TEST) In No      Medical History:   Past Medical History:   Diagnosis Date   • Anxiety 5 years    During Cancer Diagnosis   • Appendicitis    • Chronic diarrhea    • Depression 5years    Since cancer diagnosis   • Fibroids     Fibroids/Cystadenoma Right Ovary;  [facility] normal conjunctivae, sclera. , normal sclera and normal pupils  Throat/Neck: normal sound to voice. Back: no kyphosis  Respiratory:  non-labored. no increased work of breathing.     Lymph Nodes:  No abnormal nodes noted  Skin:  normal moisture and skin olivier

## 2021-11-11 ENCOUNTER — TELEPHONE (OUTPATIENT)
Dept: ENDOCRINOLOGY CLINIC | Facility: CLINIC | Age: 61
End: 2021-11-11

## 2021-11-11 NOTE — TELEPHONE ENCOUNTER
Received signed form for Prince Avila from Dr. Curt Sweet.   Faxed it back to [address] [place] [address] at [phone]

## 2021-11-28 ENCOUNTER — PATIENT MESSAGE (OUTPATIENT)
Dept: ENDOCRINOLOGY CLINIC | Facility: CLINIC | Age: 61
End: 2021-11-28

## 2021-12-06 ENCOUNTER — PATIENT MESSAGE (OUTPATIENT)
Dept: ENDOCRINOLOGY CLINIC | Facility: CLINIC | Age: 61
End: 2021-12-06

## 2021-12-06 NOTE — TELEPHONE ENCOUNTER
From: Kristie العراقي  To: Gustavo Mcwilliams MD  Sent: 11/28/2021 6:04 PM CST  Subject: Shabnam Jett,  I received my Dash pdm and will need to know  how the NEW pods exact prescription is written  so I can convey that info to [employer]

## 2021-12-07 ENCOUNTER — TELEPHONE (OUTPATIENT)
Dept: ENDOCRINOLOGY CLINIC | Facility: CLINIC | Age: 61
End: 2021-12-07

## 2021-12-07 RX ORDER — INSULIN PUMP CONTROLLER
1 EACH MISCELLANEOUS EVERY OTHER DAY
Qty: 45 EACH | Refills: 2 | Status: SHIPPED | OUTPATIENT
Start: 2021-12-07

## 2021-12-07 NOTE — TELEPHONE ENCOUNTER
Dr. Jennifer Payne for omnipods pended for [facility]. Omnipod DASH system, patient is replacing pod every other day, 45 count.      Previous prescription was faxed to [facility] on 11/11/21, patient stated this is incorrect--needs to go t

## 2022-01-18 ENCOUNTER — APPOINTMENT (OUTPATIENT)
Dept: HEMATOLOGY/ONCOLOGY | Facility: HOSPITAL | Age: 62
End: 2022-01-18
Attending: INTERNAL MEDICINE
Payer: MEDICARE

## 2022-02-01 ENCOUNTER — PATIENT MESSAGE (OUTPATIENT)
Dept: ENDOCRINOLOGY CLINIC | Facility: CLINIC | Age: 62
End: 2022-02-01

## 2022-02-01 RX ORDER — ATORVASTATIN CALCIUM 40 MG/1
40 TABLET, FILM COATED ORAL NIGHTLY
Qty: 90 TABLET | Refills: 0 | Status: SHIPPED | OUTPATIENT
Start: 2022-02-01 | End: 2022-03-09

## 2022-02-01 RX ORDER — CARVEDILOL 12.5 MG/1
12.5 TABLET ORAL 2 TIMES DAILY WITH MEALS
Qty: 180 TABLET | Refills: 0 | Status: SHIPPED | OUTPATIENT
Start: 2022-02-01 | End: 2022-03-09

## 2022-02-01 NOTE — TELEPHONE ENCOUNTER
From: Harpreet Silva  To: Yogi Selby MD  Sent: 2/1/2022 11:03 AM CST  Subject: Medication     Good Morning, I have an upcoming appointment with Dr Jolly Handy. Could you please reorder my two medications  with a 90 supply    Atorvastatin Ca 40mg QD  Carvedilol 12.5 mg BID    Thank you!   Torie Llamas

## 2022-02-02 RX ORDER — CITALOPRAM HYDROBROMIDE 10 MG/1
10 TABLET ORAL DAILY
Qty: 90 TABLET | Refills: 3 | Status: SHIPPED | OUTPATIENT
Start: 2022-02-02 | End: 2022-10-06

## 2022-02-02 RX ORDER — LETROZOLE 2.5 MG/1
2.5 TABLET, FILM COATED ORAL DAILY
Qty: 90 TABLET | Refills: 1 | Status: SHIPPED | OUTPATIENT
Start: 2022-02-02 | End: 2022-10-24

## 2022-02-03 RX ORDER — CITALOPRAM HYDROBROMIDE 10 MG/1
10 TABLET ORAL DAILY
Qty: 90 TABLET | Refills: 3 | OUTPATIENT
Start: 2022-02-03

## 2022-02-07 RX ORDER — HEPARIN SODIUM (PORCINE) LOCK FLUSH IV SOLN 100 UNIT/ML 100 UNIT/ML
5 SOLUTION INTRAVENOUS ONCE
Status: CANCELLED | OUTPATIENT
Start: 2022-02-08

## 2022-02-07 RX ORDER — SODIUM CHLORIDE 9 MG/ML
10 INJECTION INTRAVENOUS ONCE
OUTPATIENT
Start: 2022-02-08

## 2022-02-08 ENCOUNTER — NURSE ONLY (OUTPATIENT)
Dept: HEMATOLOGY/ONCOLOGY | Facility: HOSPITAL | Age: 62
End: 2022-02-08
Attending: INTERNAL MEDICINE
Payer: MEDICARE

## 2022-02-08 ENCOUNTER — HOSPITAL ENCOUNTER (OUTPATIENT)
Dept: CT IMAGING | Facility: HOSPITAL | Age: 62
Discharge: HOME | End: 2022-02-08
Attending: INTERNAL MEDICINE
Payer: MEDICARE

## 2022-02-08 DIAGNOSIS — Z45.2 ENCOUNTER FOR ADJUSTMENT OR MANAGEMENT OF VASCULAR ACCESS DEVICE: Primary | ICD-10-CM

## 2022-02-08 DIAGNOSIS — C48.2 MALIGNANT NEOPLASM OF PERITONEUM (HCC): ICD-10-CM

## 2022-02-08 DIAGNOSIS — C56.9 MALIGNANT NEOPLASM OF OVARY, UNSPECIFIED LATERALITY (HCC): ICD-10-CM

## 2022-02-08 DIAGNOSIS — E11.65 UNCONTROLLED TYPE 2 DIABETES MELLITUS WITH HYPERGLYCEMIA (HCC): ICD-10-CM

## 2022-02-08 DIAGNOSIS — Z90.49 H/O RESECTION OF LIVER: ICD-10-CM

## 2022-02-08 DIAGNOSIS — C78.6 PERITONEAL CARCINOMATOSIS (HCC): ICD-10-CM

## 2022-02-08 LAB
ALBUMIN SERPL-MCNC: 3.7 G/DL (ref 3.4–5)
ALBUMIN/GLOB SERPL: 0.9 {RATIO} (ref 1–2)
ALP LIVER SERPL-CCNC: 78 U/L
ALT SERPL-CCNC: 58 U/L
ANION GAP SERPL CALC-SCNC: 7 MMOL/L (ref 0–18)
AST SERPL-CCNC: 41 U/L (ref 15–37)
BASOPHILS # BLD AUTO: 0.04 X10(3) UL (ref 0–0.2)
BASOPHILS NFR BLD AUTO: 0.6 %
BILIRUB SERPL-MCNC: 1.5 MG/DL (ref 0.1–2)
BUN BLD-MCNC: 13 MG/DL (ref 7–18)
BUN/CREAT SERPL: 15.9 (ref 10–20)
CALCIUM BLD-MCNC: 8.6 MG/DL (ref 8.5–10.1)
CANCER AG125 SERPL-ACNC: 8.3 U/ML (ref ?–35)
CHLORIDE SERPL-SCNC: 99 MMOL/L (ref 98–112)
CHOLEST SERPL-MCNC: 101 MG/DL (ref ?–200)
CO2 SERPL-SCNC: 29 MMOL/L (ref 21–32)
CREAT BLD-MCNC: 0.7 MG/DL
CREAT BLD-MCNC: 0.82 MG/DL
CREAT UR-SCNC: 284 MG/DL
DEPRECATED RDW RBC AUTO: 40.3 FL (ref 35.1–46.3)
EOSINOPHIL # BLD AUTO: 0.11 X10(3) UL (ref 0–0.7)
EOSINOPHIL NFR BLD AUTO: 1.7 %
ERYTHROCYTE [DISTWIDTH] IN BLOOD BY AUTOMATED COUNT: 12.5 % (ref 11–15)
GLOBULIN PLAS-MCNC: 4 G/DL (ref 2.8–4.4)
GLUCOSE BLD-MCNC: 265 MG/DL (ref 70–99)
HCT VFR BLD AUTO: 40.1 %
HDLC SERPL-MCNC: 57 MG/DL (ref 40–59)
HGB BLD-MCNC: 13.4 G/DL
IMM GRANULOCYTES NFR BLD: 0.3 %
LDLC SERPL CALC-MCNC: 25 MG/DL (ref ?–100)
LYMPHOCYTES # BLD AUTO: 1.49 X10(3) UL (ref 1–4)
LYMPHOCYTES NFR BLD AUTO: 22.6 %
MCH RBC QN AUTO: 29.4 PG (ref 26–34)
MCHC RBC AUTO-ENTMCNC: 33.4 G/DL (ref 31–37)
MCV RBC AUTO: 87.9 FL
MICROALBUMIN UR-MCNC: 3.49 MG/DL
MICROALBUMIN/CREAT 24H UR-RTO: 12.3 UG/MG (ref ?–30)
MONOCYTES # BLD AUTO: 0.36 X10(3) UL (ref 0.1–1)
MONOCYTES NFR BLD AUTO: 5.5 %
NEUTROPHILS # BLD AUTO: 4.56 X10 (3) UL (ref 1.5–7.7)
NEUTROPHILS # BLD AUTO: 4.56 X10(3) UL (ref 1.5–7.7)
NEUTROPHILS NFR BLD AUTO: 69.3 %
NONHDLC SERPL-MCNC: 44 MG/DL (ref ?–130)
OSMOLALITY SERPL CALC.SUM OF ELEC: 289 MOSM/KG (ref 275–295)
PLATELET # BLD AUTO: 181 10(3)UL (ref 150–450)
POTASSIUM SERPL-SCNC: 3.7 MMOL/L (ref 3.5–5.1)
PROT SERPL-MCNC: 7.7 G/DL (ref 6.4–8.2)
SODIUM SERPL-SCNC: 135 MMOL/L (ref 136–145)
TRIGL SERPL-MCNC: 106 MG/DL (ref 30–149)
VLDLC SERPL CALC-MCNC: 14 MG/DL (ref 0–30)
WBC # BLD AUTO: 6.6 X10(3) UL (ref 4–11)

## 2022-02-08 PROCEDURE — 85025 COMPLETE CBC W/AUTO DIFF WBC: CPT

## 2022-02-08 PROCEDURE — 82565 ASSAY OF CREATININE: CPT

## 2022-02-08 PROCEDURE — 36591 DRAW BLOOD OFF VENOUS DEVICE: CPT

## 2022-02-08 PROCEDURE — 82043 UR ALBUMIN QUANTITATIVE: CPT

## 2022-02-08 PROCEDURE — 80061 LIPID PANEL: CPT

## 2022-02-08 PROCEDURE — 74177 CT ABD & PELVIS W/CONTRAST: CPT | Performed by: INTERNAL MEDICINE

## 2022-02-08 PROCEDURE — 80053 COMPREHEN METABOLIC PANEL: CPT

## 2022-02-08 PROCEDURE — 82570 ASSAY OF URINE CREATININE: CPT

## 2022-02-08 PROCEDURE — 71260 CT THORAX DX C+: CPT | Performed by: INTERNAL MEDICINE

## 2022-02-08 PROCEDURE — 86304 IMMUNOASSAY TUMOR CA 125: CPT

## 2022-02-08 RX ORDER — HEPARIN SODIUM (PORCINE) LOCK FLUSH IV SOLN 100 UNIT/ML 100 UNIT/ML
5 SOLUTION INTRAVENOUS ONCE
Status: COMPLETED | OUTPATIENT
Start: 2022-02-08 | End: 2022-02-08

## 2022-02-08 RX ORDER — HEPARIN SODIUM (PORCINE) LOCK FLUSH IV SOLN 100 UNIT/ML 100 UNIT/ML
5 SOLUTION INTRAVENOUS ONCE
OUTPATIENT
Start: 2022-02-08

## 2022-02-08 RX ORDER — SODIUM CHLORIDE 9 MG/ML
10 INJECTION INTRAVENOUS ONCE
OUTPATIENT
Start: 2022-02-08

## 2022-02-08 RX ADMIN — HEPARIN SODIUM (PORCINE) LOCK FLUSH IV SOLN 100 UNIT/ML 500 UNITS: 100 SOLUTION INTRAVENOUS at 13:14:00

## 2022-02-15 ENCOUNTER — OFFICE VISIT (OUTPATIENT)
Dept: HEMATOLOGY/ONCOLOGY | Facility: HOSPITAL | Age: 62
End: 2022-02-15
Attending: INTERNAL MEDICINE
Payer: MEDICARE

## 2022-02-15 VITALS
DIASTOLIC BLOOD PRESSURE: 69 MMHG | HEIGHT: 64 IN | BODY MASS INDEX: 35.34 KG/M2 | TEMPERATURE: 99 F | RESPIRATION RATE: 18 BRPM | HEART RATE: 87 BPM | SYSTOLIC BLOOD PRESSURE: 118 MMHG | OXYGEN SATURATION: 96 % | WEIGHT: 207 LBS

## 2022-02-15 DIAGNOSIS — C78.6 PERITONEAL CARCINOMATOSIS (HCC): ICD-10-CM

## 2022-02-15 DIAGNOSIS — Z51.11 ENCOUNTER FOR CHEMOTHERAPY MANAGEMENT: ICD-10-CM

## 2022-02-15 DIAGNOSIS — Z90.49 H/O RESECTION OF LIVER: ICD-10-CM

## 2022-02-15 DIAGNOSIS — C56.9 MALIGNANT NEOPLASM OF OVARY, UNSPECIFIED LATERALITY (HCC): Primary | ICD-10-CM

## 2022-02-15 PROCEDURE — 99215 OFFICE O/P EST HI 40 MIN: CPT | Performed by: INTERNAL MEDICINE

## 2022-02-15 RX ORDER — INSULIN PUMP CART,CONT INF,RF
CARTRIDGE (EA) SUBCUTANEOUS
Qty: 45 EACH | Refills: 0 | Status: SHIPPED | OUTPATIENT
Start: 2022-02-15

## 2022-03-09 ENCOUNTER — TELEPHONE (OUTPATIENT)
Dept: ENDOCRINOLOGY CLINIC | Facility: CLINIC | Age: 62
End: 2022-03-09

## 2022-03-09 ENCOUNTER — OFFICE VISIT (OUTPATIENT)
Dept: ENDOCRINOLOGY CLINIC | Facility: CLINIC | Age: 62
End: 2022-03-09
Payer: MEDICARE

## 2022-03-09 VITALS
HEART RATE: 88 BPM | SYSTOLIC BLOOD PRESSURE: 132 MMHG | BODY MASS INDEX: 35 KG/M2 | WEIGHT: 202 LBS | DIASTOLIC BLOOD PRESSURE: 81 MMHG

## 2022-03-09 DIAGNOSIS — E11.65 UNCONTROLLED TYPE 2 DIABETES MELLITUS WITH HYPERGLYCEMIA (HCC): Primary | ICD-10-CM

## 2022-03-09 DIAGNOSIS — R53.83 FATIGUE, UNSPECIFIED TYPE: ICD-10-CM

## 2022-03-09 LAB
CARTRIDGE LOT#: ABNORMAL NUMERIC
GLUCOSE BLOOD: 262
HEMOGLOBIN A1C: 8.1 % (ref 4.3–5.6)
TEST STRIP LOT #: NORMAL NUMERIC

## 2022-03-09 PROCEDURE — 99214 OFFICE O/P EST MOD 30 MIN: CPT | Performed by: INTERNAL MEDICINE

## 2022-03-09 PROCEDURE — 82947 ASSAY GLUCOSE BLOOD QUANT: CPT | Performed by: INTERNAL MEDICINE

## 2022-03-09 PROCEDURE — 36416 COLLJ CAPILLARY BLOOD SPEC: CPT | Performed by: INTERNAL MEDICINE

## 2022-03-09 PROCEDURE — 83036 HEMOGLOBIN GLYCOSYLATED A1C: CPT | Performed by: INTERNAL MEDICINE

## 2022-03-09 RX ORDER — BLOOD-GLUCOSE METER
KIT MISCELLANEOUS
Qty: 200 STRIP | Refills: 2 | Status: SHIPPED | OUTPATIENT
Start: 2022-03-09

## 2022-03-09 RX ORDER — ATORVASTATIN CALCIUM 40 MG/1
40 TABLET, FILM COATED ORAL NIGHTLY
Qty: 90 TABLET | Refills: 0 | Status: SHIPPED | OUTPATIENT
Start: 2022-03-09

## 2022-03-09 RX ORDER — CARVEDILOL 12.5 MG/1
12.5 TABLET ORAL 2 TIMES DAILY WITH MEALS
Qty: 180 TABLET | Refills: 0 | Status: SHIPPED | OUTPATIENT
Start: 2022-03-09

## 2022-03-24 NOTE — TELEPHONE ENCOUNTER
Followed up with [facility] to ensure everything from our end has been received. Patient had been trying to get CGM since last year and no success with [facility] and no follow up has been done. Spoke with Phani Marcelo at [facility] and states they have everything they need from the clinic. They have been trying to contact patient but to no avail. Patient would need to contact their intake department at [phone]. RN sent [employer] message to patient giving her above information.

## 2022-03-31 NOTE — TELEPHONE ENCOUNTER
Patient needs new script to accommodate the insulin increase patient is going through omnie pods much faster. Please send to [facility], please call at:[phone],thanks.
Pt states he was charged $1000.00 for pods so she feels everything will be fine with her order and she will call if she has any issues.     ENC closed
Pt states she is low on PODS since increasing to 1x48 hrs. Rn sent updated order to Mail order pharm and called OmniPod rep Stacey Flood) to inquire about getting emergency supply for pt.  RN to call pharm to check on new order
RN spoke with [facility] mail order pharmacy. The new order is not in process yet but it has been rec'd. Pharm states she will put a note on the order to update amt to 45 pods and send STAT.      Difficult to find pt in system so use OLD order number [id number]
pmd

## 2022-05-17 ENCOUNTER — OFFICE VISIT (OUTPATIENT)
Dept: HEMATOLOGY/ONCOLOGY | Facility: HOSPITAL | Age: 62
End: 2022-05-17
Attending: INTERNAL MEDICINE
Payer: MEDICARE

## 2022-05-17 VITALS
RESPIRATION RATE: 18 BRPM | HEART RATE: 75 BPM | WEIGHT: 199.38 LBS | BODY MASS INDEX: 34.04 KG/M2 | OXYGEN SATURATION: 96 % | HEIGHT: 64 IN | TEMPERATURE: 99 F | SYSTOLIC BLOOD PRESSURE: 145 MMHG | DIASTOLIC BLOOD PRESSURE: 67 MMHG

## 2022-05-17 DIAGNOSIS — C78.6 PERITONEAL CARCINOMATOSIS (HCC): ICD-10-CM

## 2022-05-17 DIAGNOSIS — Z90.49 H/O RESECTION OF LIVER: ICD-10-CM

## 2022-05-17 DIAGNOSIS — C56.9 MALIGNANT NEOPLASM OF OVARY, UNSPECIFIED LATERALITY (HCC): Primary | ICD-10-CM

## 2022-05-17 DIAGNOSIS — C56.9 MALIGNANT NEOPLASM OF OVARY, UNSPECIFIED LATERALITY (HCC): ICD-10-CM

## 2022-05-17 DIAGNOSIS — Z51.11 ENCOUNTER FOR CHEMOTHERAPY MANAGEMENT: ICD-10-CM

## 2022-05-17 DIAGNOSIS — Z45.2 ENCOUNTER FOR ADJUSTMENT OR MANAGEMENT OF VASCULAR ACCESS DEVICE: Primary | ICD-10-CM

## 2022-05-17 DIAGNOSIS — C48.2 MALIGNANT NEOPLASM OF PERITONEUM (HCC): ICD-10-CM

## 2022-05-17 DIAGNOSIS — R53.83 FATIGUE, UNSPECIFIED TYPE: ICD-10-CM

## 2022-05-17 LAB
ALBUMIN SERPL-MCNC: 3.4 G/DL (ref 3.4–5)
ALBUMIN/GLOB SERPL: 0.9 {RATIO} (ref 1–2)
ALP LIVER SERPL-CCNC: 61 U/L
ALT SERPL-CCNC: 43 U/L
ANION GAP SERPL CALC-SCNC: 9 MMOL/L (ref 0–18)
AST SERPL-CCNC: 34 U/L (ref 15–37)
BASOPHILS # BLD AUTO: 0.03 X10(3) UL (ref 0–0.2)
BASOPHILS NFR BLD AUTO: 0.6 %
BILIRUB SERPL-MCNC: 1.3 MG/DL (ref 0.1–2)
BUN BLD-MCNC: 10 MG/DL (ref 7–18)
BUN/CREAT SERPL: 12.8 (ref 10–20)
CALCIUM BLD-MCNC: 8.3 MG/DL (ref 8.5–10.1)
CANCER AG125 SERPL-ACNC: 7.7 U/ML (ref ?–35)
CHLORIDE SERPL-SCNC: 106 MMOL/L (ref 98–112)
CO2 SERPL-SCNC: 27 MMOL/L (ref 21–32)
CREAT BLD-MCNC: 0.78 MG/DL
DEPRECATED RDW RBC AUTO: 42.4 FL (ref 35.1–46.3)
EOSINOPHIL # BLD AUTO: 0.08 X10(3) UL (ref 0–0.7)
EOSINOPHIL NFR BLD AUTO: 1.6 %
ERYTHROCYTE [DISTWIDTH] IN BLOOD BY AUTOMATED COUNT: 13.1 % (ref 11–15)
GLOBULIN PLAS-MCNC: 3.6 G/DL (ref 2.8–4.4)
GLUCOSE BLD-MCNC: 109 MG/DL (ref 70–99)
HCT VFR BLD AUTO: 38.1 %
HGB BLD-MCNC: 12.4 G/DL
IMM GRANULOCYTES # BLD AUTO: 0.02 X10(3) UL (ref 0–1)
IMM GRANULOCYTES NFR BLD: 0.4 %
LYMPHOCYTES # BLD AUTO: 1.61 X10(3) UL (ref 1–4)
LYMPHOCYTES NFR BLD AUTO: 32.3 %
MCH RBC QN AUTO: 29.1 PG (ref 26–34)
MCHC RBC AUTO-ENTMCNC: 32.5 G/DL (ref 31–37)
MCV RBC AUTO: 89.4 FL
MONOCYTES # BLD AUTO: 0.34 X10(3) UL (ref 0.1–1)
MONOCYTES NFR BLD AUTO: 6.8 %
NEUTROPHILS # BLD AUTO: 2.91 X10 (3) UL (ref 1.5–7.7)
NEUTROPHILS # BLD AUTO: 2.91 X10(3) UL (ref 1.5–7.7)
NEUTROPHILS NFR BLD AUTO: 58.3 %
OSMOLALITY SERPL CALC.SUM OF ELEC: 294 MOSM/KG (ref 275–295)
PLATELET # BLD AUTO: 232 10(3)UL (ref 150–450)
POTASSIUM SERPL-SCNC: 3.5 MMOL/L (ref 3.5–5.1)
PROT SERPL-MCNC: 7 G/DL (ref 6.4–8.2)
RBC # BLD AUTO: 4.26 X10(6)UL
SODIUM SERPL-SCNC: 142 MMOL/L (ref 136–145)
T3FREE SERPL-MCNC: 2.1 PG/ML (ref 2.4–4.2)
T4 FREE SERPL-MCNC: 1 NG/DL (ref 0.8–1.7)
TSI SER-ACNC: 1.6 MIU/ML (ref 0.36–3.74)
WBC # BLD AUTO: 5 X10(3) UL (ref 4–11)

## 2022-05-17 PROCEDURE — 85025 COMPLETE CBC W/AUTO DIFF WBC: CPT

## 2022-05-17 PROCEDURE — 36591 DRAW BLOOD OFF VENOUS DEVICE: CPT

## 2022-05-17 PROCEDURE — 84443 ASSAY THYROID STIM HORMONE: CPT

## 2022-05-17 PROCEDURE — 84439 ASSAY OF FREE THYROXINE: CPT

## 2022-05-17 PROCEDURE — 99215 OFFICE O/P EST HI 40 MIN: CPT | Performed by: INTERNAL MEDICINE

## 2022-05-17 PROCEDURE — 80053 COMPREHEN METABOLIC PANEL: CPT

## 2022-05-17 PROCEDURE — 84481 FREE ASSAY (FT-3): CPT

## 2022-05-17 PROCEDURE — 86304 IMMUNOASSAY TUMOR CA 125: CPT

## 2022-05-17 RX ORDER — HEPARIN SODIUM (PORCINE) LOCK FLUSH IV SOLN 100 UNIT/ML 100 UNIT/ML
5 SOLUTION INTRAVENOUS ONCE
Status: COMPLETED | OUTPATIENT
Start: 2022-05-17 | End: 2022-05-17

## 2022-05-17 RX ORDER — SODIUM CHLORIDE 9 MG/ML
10 INJECTION INTRAVENOUS ONCE
OUTPATIENT
Start: 2022-05-17

## 2022-05-17 RX ORDER — HEPARIN SODIUM (PORCINE) LOCK FLUSH IV SOLN 100 UNIT/ML 100 UNIT/ML
5 SOLUTION INTRAVENOUS ONCE
OUTPATIENT
Start: 2022-05-17

## 2022-05-17 RX ADMIN — HEPARIN SODIUM (PORCINE) LOCK FLUSH IV SOLN 100 UNIT/ML 500 UNITS: 100 SOLUTION INTRAVENOUS at 11:28:00

## 2022-06-07 ENCOUNTER — TELEPHONE (OUTPATIENT)
Dept: HEMATOLOGY/ONCOLOGY | Facility: HOSPITAL | Age: 62
End: 2022-06-07

## 2022-07-08 ENCOUNTER — PATIENT MESSAGE (OUTPATIENT)
Dept: ENDOCRINOLOGY CLINIC | Facility: CLINIC | Age: 62
End: 2022-07-08

## 2022-07-11 ENCOUNTER — OFFICE VISIT (OUTPATIENT)
Dept: ENDOCRINOLOGY CLINIC | Facility: CLINIC | Age: 62
End: 2022-07-11
Payer: MEDICARE

## 2022-07-11 VITALS
HEART RATE: 73 BPM | DIASTOLIC BLOOD PRESSURE: 76 MMHG | SYSTOLIC BLOOD PRESSURE: 120 MMHG | WEIGHT: 199.63 LBS | BODY MASS INDEX: 34 KG/M2

## 2022-07-11 DIAGNOSIS — E11.65 UNCONTROLLED TYPE 2 DIABETES MELLITUS WITH HYPERGLYCEMIA (HCC): Primary | ICD-10-CM

## 2022-07-11 LAB
CARTRIDGE LOT#: ABNORMAL NUMERIC
GLUCOSE BLOOD: 212
HEMOGLOBIN A1C: 6.6 % (ref 4.3–5.6)
TEST STRIP LOT #: NORMAL NUMERIC

## 2022-07-11 PROCEDURE — 83036 HEMOGLOBIN GLYCOSYLATED A1C: CPT | Performed by: INTERNAL MEDICINE

## 2022-07-11 PROCEDURE — 99213 OFFICE O/P EST LOW 20 MIN: CPT | Performed by: INTERNAL MEDICINE

## 2022-07-11 PROCEDURE — 82947 ASSAY GLUCOSE BLOOD QUANT: CPT | Performed by: INTERNAL MEDICINE

## 2022-07-11 RX ORDER — CARVEDILOL 12.5 MG/1
12.5 TABLET ORAL 2 TIMES DAILY WITH MEALS
Qty: 180 TABLET | Refills: 1 | Status: SHIPPED | OUTPATIENT
Start: 2022-07-11

## 2022-07-11 RX ORDER — CARVEDILOL 12.5 MG/1
12.5 TABLET ORAL 2 TIMES DAILY WITH MEALS
Qty: 180 TABLET | Refills: 0 | Status: SHIPPED | OUTPATIENT
Start: 2022-07-11 | End: 2022-07-11

## 2022-07-11 RX ORDER — ATORVASTATIN CALCIUM 40 MG/1
40 TABLET, FILM COATED ORAL NIGHTLY
Qty: 90 TABLET | Refills: 0 | Status: SHIPPED | OUTPATIENT
Start: 2022-07-11 | End: 2022-07-11

## 2022-07-11 RX ORDER — ATORVASTATIN CALCIUM 40 MG/1
40 TABLET, FILM COATED ORAL NIGHTLY
Qty: 90 TABLET | Refills: 1 | Status: SHIPPED | OUTPATIENT
Start: 2022-07-11

## 2022-07-12 ENCOUNTER — NURSE ONLY (OUTPATIENT)
Dept: HEMATOLOGY/ONCOLOGY | Facility: HOSPITAL | Age: 62
End: 2022-07-12
Attending: INTERNAL MEDICINE
Payer: MEDICARE

## 2022-07-12 PROCEDURE — 96523 IRRIG DRUG DELIVERY DEVICE: CPT

## 2022-09-12 RX ORDER — INSULIN PUMP CONTROLLER
EACH MISCELLANEOUS
Qty: 45 EACH | Refills: 2 | Status: SHIPPED | OUTPATIENT
Start: 2022-09-12

## 2022-09-19 ENCOUNTER — HOSPITAL ENCOUNTER (OUTPATIENT)
Dept: CT IMAGING | Facility: HOSPITAL | Age: 62
Discharge: HOME | End: 2022-09-19
Attending: INTERNAL MEDICINE
Payer: MEDICARE

## 2022-09-19 ENCOUNTER — NURSE ONLY (OUTPATIENT)
Dept: HEMATOLOGY/ONCOLOGY | Facility: HOSPITAL | Age: 62
End: 2022-09-19
Attending: INTERNAL MEDICINE
Payer: MEDICARE

## 2022-09-19 DIAGNOSIS — Z90.49 H/O RESECTION OF LIVER: ICD-10-CM

## 2022-09-19 DIAGNOSIS — C56.9 MALIGNANT NEOPLASM OF OVARY, UNSPECIFIED LATERALITY (HCC): ICD-10-CM

## 2022-09-19 DIAGNOSIS — C48.2 MALIGNANT NEOPLASM OF PERITONEUM (HCC): ICD-10-CM

## 2022-09-19 DIAGNOSIS — C78.6 PERITONEAL CARCINOMATOSIS (HCC): ICD-10-CM

## 2022-09-19 DIAGNOSIS — Z45.2 ENCOUNTER FOR ADJUSTMENT OR MANAGEMENT OF VASCULAR ACCESS DEVICE: Primary | ICD-10-CM

## 2022-09-19 LAB
ALBUMIN SERPL-MCNC: 3.8 G/DL (ref 3.4–5)
ALBUMIN/GLOB SERPL: 1.1 {RATIO} (ref 1–2)
ALP LIVER SERPL-CCNC: 66 U/L
ALT SERPL-CCNC: 45 U/L
ANION GAP SERPL CALC-SCNC: 8 MMOL/L (ref 0–18)
AST SERPL-CCNC: 29 U/L (ref 15–37)
BASOPHILS # BLD AUTO: 0.03 X10(3) UL (ref 0–0.2)
BASOPHILS NFR BLD AUTO: 0.4 %
BILIRUB SERPL-MCNC: 1.9 MG/DL (ref 0.1–2)
BUN BLD-MCNC: 15 MG/DL (ref 7–18)
BUN/CREAT SERPL: 20.3 (ref 10–20)
CALCIUM BLD-MCNC: 8.6 MG/DL (ref 8.5–10.1)
CHLORIDE SERPL-SCNC: 105 MMOL/L (ref 98–112)
CO2 SERPL-SCNC: 27 MMOL/L (ref 21–32)
CREAT BLD-MCNC: 0.7 MG/DL
CREAT BLD-MCNC: 0.74 MG/DL
DEPRECATED RDW RBC AUTO: 41.6 FL (ref 35.1–46.3)
EOSINOPHIL # BLD AUTO: 0.11 X10(3) UL (ref 0–0.7)
EOSINOPHIL NFR BLD AUTO: 1.6 %
ERYTHROCYTE [DISTWIDTH] IN BLOOD BY AUTOMATED COUNT: 12.7 % (ref 11–15)
GFR SERPLBLD BASED ON 1.73 SQ M-ARVRAT: 91 ML/MIN/1.73M2 (ref 60–?)
GFR SERPLBLD BASED ON 1.73 SQ M-ARVRAT: 98 ML/MIN/1.73M2 (ref 60–?)
GLOBULIN PLAS-MCNC: 3.4 G/DL (ref 2.8–4.4)
GLUCOSE BLD-MCNC: 178 MG/DL (ref 70–99)
HCT VFR BLD AUTO: 39.7 %
HGB BLD-MCNC: 13.1 G/DL
IMM GRANULOCYTES # BLD AUTO: 0.01 X10(3) UL (ref 0–1)
IMM GRANULOCYTES NFR BLD: 0.1 %
LYMPHOCYTES # BLD AUTO: 1.77 X10(3) UL (ref 1–4)
LYMPHOCYTES NFR BLD AUTO: 25.7 %
MCH RBC QN AUTO: 29.4 PG (ref 26–34)
MCHC RBC AUTO-ENTMCNC: 33 G/DL (ref 31–37)
MCV RBC AUTO: 89.2 FL
MONOCYTES # BLD AUTO: 0.43 X10(3) UL (ref 0.1–1)
MONOCYTES NFR BLD AUTO: 6.2 %
NEUTROPHILS # BLD AUTO: 4.54 X10 (3) UL (ref 1.5–7.7)
NEUTROPHILS # BLD AUTO: 4.54 X10(3) UL (ref 1.5–7.7)
NEUTROPHILS NFR BLD AUTO: 66 %
OSMOLALITY SERPL CALC.SUM OF ELEC: 295 MOSM/KG (ref 275–295)
PLATELET # BLD AUTO: 221 10(3)UL (ref 150–450)
POTASSIUM SERPL-SCNC: 3.7 MMOL/L (ref 3.5–5.1)
PROT SERPL-MCNC: 7.2 G/DL (ref 6.4–8.2)
RBC # BLD AUTO: 4.45 X10(6)UL
SODIUM SERPL-SCNC: 140 MMOL/L (ref 136–145)
WBC # BLD AUTO: 6.9 X10(3) UL (ref 4–11)

## 2022-09-19 PROCEDURE — 85025 COMPLETE CBC W/AUTO DIFF WBC: CPT

## 2022-09-19 PROCEDURE — 80053 COMPREHEN METABOLIC PANEL: CPT

## 2022-09-19 PROCEDURE — 71260 CT THORAX DX C+: CPT | Performed by: INTERNAL MEDICINE

## 2022-09-19 PROCEDURE — 82565 ASSAY OF CREATININE: CPT

## 2022-09-19 PROCEDURE — 36591 DRAW BLOOD OFF VENOUS DEVICE: CPT

## 2022-09-19 PROCEDURE — 74177 CT ABD & PELVIS W/CONTRAST: CPT | Performed by: INTERNAL MEDICINE

## 2022-09-19 RX ORDER — SODIUM CHLORIDE 9 MG/ML
10 INJECTION INTRAVENOUS ONCE
OUTPATIENT
Start: 2022-09-19

## 2022-09-19 RX ORDER — HEPARIN SODIUM (PORCINE) LOCK FLUSH IV SOLN 100 UNIT/ML 100 UNIT/ML
5 SOLUTION INTRAVENOUS ONCE
Status: COMPLETED | OUTPATIENT
Start: 2022-09-19 | End: 2022-09-19

## 2022-09-19 RX ORDER — HEPARIN SODIUM (PORCINE) LOCK FLUSH IV SOLN 100 UNIT/ML 100 UNIT/ML
5 SOLUTION INTRAVENOUS ONCE
OUTPATIENT
Start: 2022-09-19

## 2022-09-19 RX ADMIN — HEPARIN SODIUM (PORCINE) LOCK FLUSH IV SOLN 100 UNIT/ML 500 UNITS: 100 SOLUTION INTRAVENOUS at 13:20:00

## 2022-09-21 ENCOUNTER — OFFICE VISIT (OUTPATIENT)
Dept: HEMATOLOGY/ONCOLOGY | Facility: HOSPITAL | Age: 62
End: 2022-09-21
Attending: INTERNAL MEDICINE
Payer: MEDICARE

## 2022-09-21 VITALS
SYSTOLIC BLOOD PRESSURE: 144 MMHG | HEIGHT: 64 IN | HEART RATE: 69 BPM | RESPIRATION RATE: 18 BRPM | DIASTOLIC BLOOD PRESSURE: 65 MMHG | OXYGEN SATURATION: 96 % | TEMPERATURE: 99 F | BODY MASS INDEX: 34.15 KG/M2 | WEIGHT: 200 LBS

## 2022-09-21 DIAGNOSIS — C48.2 MALIGNANT NEOPLASM OF PERITONEUM (HCC): ICD-10-CM

## 2022-09-21 DIAGNOSIS — Z90.49 H/O RESECTION OF LIVER: Primary | ICD-10-CM

## 2022-09-21 DIAGNOSIS — Z51.11 ENCOUNTER FOR CHEMOTHERAPY MANAGEMENT: ICD-10-CM

## 2022-09-21 DIAGNOSIS — C56.9 MALIGNANT NEOPLASM OF OVARY, UNSPECIFIED LATERALITY (HCC): ICD-10-CM

## 2022-09-21 PROCEDURE — 99215 OFFICE O/P EST HI 40 MIN: CPT | Performed by: INTERNAL MEDICINE

## 2022-10-04 ENCOUNTER — APPOINTMENT (OUTPATIENT)
Dept: HEMATOLOGY/ONCOLOGY | Facility: HOSPITAL | Age: 62
End: 2022-10-04
Attending: INTERNAL MEDICINE
Payer: MEDICARE

## 2022-10-05 ENCOUNTER — PATIENT MESSAGE (OUTPATIENT)
Dept: ENDOCRINOLOGY CLINIC | Facility: CLINIC | Age: 62
End: 2022-10-05

## 2022-10-06 ENCOUNTER — TELEPHONE (OUTPATIENT)
Dept: ENDOCRINOLOGY CLINIC | Facility: CLINIC | Age: 62
End: 2022-10-06

## 2022-10-06 ENCOUNTER — PATIENT MESSAGE (OUTPATIENT)
Dept: ENDOCRINOLOGY CLINIC | Facility: CLINIC | Age: 62
End: 2022-10-06

## 2022-10-06 DIAGNOSIS — E11.65 UNCONTROLLED TYPE 2 DIABETES MELLITUS WITH HYPERGLYCEMIA (HCC): Primary | ICD-10-CM

## 2022-10-06 RX ORDER — CARVEDILOL 12.5 MG/1
12.5 TABLET ORAL 2 TIMES DAILY WITH MEALS
Qty: 180 TABLET | Refills: 1 | Status: SHIPPED | OUTPATIENT
Start: 2022-10-06

## 2022-10-06 RX ORDER — ATORVASTATIN CALCIUM 40 MG/1
40 TABLET, FILM COATED ORAL NIGHTLY
Qty: 90 TABLET | Refills: 1 | Status: SHIPPED | OUTPATIENT
Start: 2022-10-06

## 2022-10-06 RX ORDER — INSULIN ASPART 100 [IU]/ML
INJECTION, SOLUTION INTRAVENOUS; SUBCUTANEOUS
Qty: 180 ML | Refills: 1 | Status: SHIPPED | OUTPATIENT
Start: 2022-10-06

## 2022-10-06 NOTE — TELEPHONE ENCOUNTER
Called pt to inform her that we do not have pricing for Novolog or Humalog. I advised her to call her pharmacy and insurance company. However, pt stated that she did call and she was told that she will need a prescription so pharmacy can run pricing on both medications.      Please advised

## 2022-10-06 NOTE — TELEPHONE ENCOUNTER
Unfortunately I won't be able to electronically send both at the same time. I could give printed scripts for both to price shop or send one at a time. Thanks.

## 2022-10-06 NOTE — TELEPHONE ENCOUNTER
Novolog, atorvastin, and carvedilol pended and routed to provider    LOV 7/11/2022 a1c of 6.6%  Upcoming appt 11/14/2022    Responded to pt that Citalopram was prescribed by CHANDRA Figueroa

## 2022-10-06 NOTE — TELEPHONE ENCOUNTER
From: German Petty  To: Remberto Hernandez MD  Sent: 10/5/2022 5:59 PM CDT  Subject: Insulin pricing    Hello,   I have an upcoming appointment with Dr Any Renner in November but need refills on Insulin and ALL ORAL meds in a few weeks (my Dash-Omnipods are [facility] and not due for re-order)    How do you check pricing on Humalog vs.  Novalog so I can purchase the less expensive? Do you write both perscriptions to allow [facility] to check pricing?   Thank you,  Dayana Ventura

## 2022-10-06 NOTE — TELEPHONE ENCOUNTER
Patient is request a new Rx for Insulin. Patient would like to know what is the difference in pricing for Novolog vs Humalog. Please confirm.

## 2022-10-07 RX ORDER — CITALOPRAM HYDROBROMIDE 10 MG/1
10 TABLET ORAL DAILY
Qty: 90 TABLET | Refills: 3 | Status: SHIPPED | OUTPATIENT
Start: 2022-10-07 | End: 2023-03-16

## 2022-10-07 RX ORDER — CITALOPRAM HYDROBROMIDE 10 MG/1
10 TABLET ORAL DAILY
Qty: 90 TABLET | Refills: 3 | OUTPATIENT
Start: 2022-10-07

## 2022-10-11 ENCOUNTER — APPOINTMENT (OUTPATIENT)
Dept: HEMATOLOGY/ONCOLOGY | Facility: HOSPITAL | Age: 62
End: 2022-10-11
Attending: INTERNAL MEDICINE
Payer: MEDICARE

## 2022-10-12 RX ORDER — INSULIN ASPART 100 [IU]/ML
INJECTION, SOLUTION INTRAVENOUS; SUBCUTANEOUS
Qty: 180 ML | Refills: 1 | Status: SHIPPED | OUTPATIENT
Start: 2022-10-12

## 2022-10-25 ENCOUNTER — TELEPHONE (OUTPATIENT)
Dept: HEMATOLOGY/ONCOLOGY | Facility: HOSPITAL | Age: 62
End: 2022-10-25

## 2022-10-25 NOTE — TELEPHONE ENCOUNTER
[facility]  Pharmacy  Response Requested    Patient Requests New RX    Please send over current Meds, and Louann.   [id number] [address] Location

## 2022-11-14 ENCOUNTER — OFFICE VISIT (OUTPATIENT)
Dept: ENDOCRINOLOGY CLINIC | Facility: CLINIC | Age: 62
End: 2022-11-14
Payer: MEDICARE

## 2022-11-14 VITALS
HEART RATE: 67 BPM | DIASTOLIC BLOOD PRESSURE: 68 MMHG | BODY MASS INDEX: 34 KG/M2 | SYSTOLIC BLOOD PRESSURE: 113 MMHG | WEIGHT: 196 LBS

## 2022-11-14 DIAGNOSIS — E11.65 UNCONTROLLED TYPE 2 DIABETES MELLITUS WITH HYPERGLYCEMIA (HCC): Primary | ICD-10-CM

## 2022-11-14 LAB
CARTRIDGE LOT#: ABNORMAL NUMERIC
GLUCOSE BLOOD: 202
HEMOGLOBIN A1C: 6.2 % (ref 4.3–5.6)
TEST STRIP LOT #: NORMAL NUMERIC

## 2022-11-14 PROCEDURE — 99214 OFFICE O/P EST MOD 30 MIN: CPT | Performed by: INTERNAL MEDICINE

## 2022-11-14 PROCEDURE — 83036 HEMOGLOBIN GLYCOSYLATED A1C: CPT | Performed by: INTERNAL MEDICINE

## 2022-11-14 PROCEDURE — 82947 ASSAY GLUCOSE BLOOD QUANT: CPT | Performed by: INTERNAL MEDICINE

## 2022-11-14 RX ORDER — INSULIN PUMP CONTROLLER
1 EACH MISCELLANEOUS EVERY OTHER DAY
Qty: 45 EACH | Refills: 2 | Status: SHIPPED | OUTPATIENT
Start: 2022-11-14

## 2022-11-14 RX ORDER — INSULIN ASPART 100 [IU]/ML
INJECTION, SOLUTION INTRAVENOUS; SUBCUTANEOUS
Qty: 180 ML | Refills: 1 | Status: SHIPPED | OUTPATIENT
Start: 2022-11-14

## 2022-11-14 RX ORDER — CARVEDILOL 12.5 MG/1
12.5 TABLET ORAL 2 TIMES DAILY WITH MEALS
Qty: 180 TABLET | Refills: 1 | Status: SHIPPED | OUTPATIENT
Start: 2022-11-14

## 2022-11-14 RX ORDER — ATORVASTATIN CALCIUM 40 MG/1
40 TABLET, FILM COATED ORAL NIGHTLY
Qty: 90 TABLET | Refills: 1 | Status: SHIPPED | OUTPATIENT
Start: 2022-11-14

## 2022-12-08 ENCOUNTER — NURSE ONLY (OUTPATIENT)
Dept: HEMATOLOGY/ONCOLOGY | Facility: HOSPITAL | Age: 62
End: 2022-12-08
Attending: INTERNAL MEDICINE
Payer: MEDICARE

## 2022-12-08 DIAGNOSIS — Z45.2 ENCOUNTER FOR ADJUSTMENT OR MANAGEMENT OF VASCULAR ACCESS DEVICE: Primary | ICD-10-CM

## 2022-12-08 DIAGNOSIS — C48.2 MALIGNANT NEOPLASM OF PERITONEUM (HCC): ICD-10-CM

## 2022-12-08 PROCEDURE — 96523 IRRIG DRUG DELIVERY DEVICE: CPT

## 2022-12-08 RX ORDER — HEPARIN SODIUM (PORCINE) LOCK FLUSH IV SOLN 100 UNIT/ML 100 UNIT/ML
5 SOLUTION INTRAVENOUS ONCE
Status: COMPLETED | OUTPATIENT
Start: 2022-12-08 | End: 2022-12-08

## 2022-12-08 RX ORDER — SODIUM CHLORIDE 9 MG/ML
10 INJECTION INTRAVENOUS ONCE
OUTPATIENT
Start: 2022-12-08

## 2022-12-08 RX ORDER — HEPARIN SODIUM (PORCINE) LOCK FLUSH IV SOLN 100 UNIT/ML 100 UNIT/ML
5 SOLUTION INTRAVENOUS ONCE
OUTPATIENT
Start: 2022-12-08

## 2022-12-08 RX ADMIN — HEPARIN SODIUM (PORCINE) LOCK FLUSH IV SOLN 100 UNIT/ML 500 UNITS: 100 SOLUTION INTRAVENOUS at 11:45:00

## 2023-01-01 NOTE — TELEPHONE ENCOUNTER
Current Outpatient Prescriptions:  carvedilol 12.5 MG Oral Tab TAKE 1 TABLET BY MOUTH 2 (TWO) TIMES DAILY WITH MEALS.  Disp: 180 tablet Rfl: 1     Refill
LOV 9/2017. Due for follow up. Sent mychart reminder to patient.
William

## 2023-01-18 DIAGNOSIS — E11.65 UNCONTROLLED TYPE 2 DIABETES MELLITUS WITH HYPERGLYCEMIA (HCC): ICD-10-CM

## 2023-01-18 RX ORDER — INSULIN ASPART 100 [IU]/ML
INJECTION, SOLUTION INTRAVENOUS; SUBCUTANEOUS
Qty: 180 ML | Refills: 1 | Status: SHIPPED | OUTPATIENT
Start: 2023-01-18

## 2023-02-03 ENCOUNTER — TELEPHONE (OUTPATIENT)
Dept: ENDOCRINOLOGY CLINIC | Facility: CLINIC | Age: 63
End: 2023-02-03

## 2023-02-03 NOTE — TELEPHONE ENCOUNTER
Received fax from [address] [place] [address] [facility], attached is requesting for chart notes within the last 6 months. Printed LOV notes as of 11/14/2022. Attached demographics. Faxed to [phone] as stated in form. Fax used at [facility] location, waiting confirmation.

## 2023-02-03 NOTE — TELEPHONE ENCOUNTER
Received confirmation. Sent to scanning. Signing encounter no further action is required at this time.

## 2023-02-22 ENCOUNTER — PATIENT MESSAGE (OUTPATIENT)
Dept: ENDOCRINOLOGY CLINIC | Facility: CLINIC | Age: 63
End: 2023-02-22

## 2023-03-07 ENCOUNTER — TELEPHONE (OUTPATIENT)
Dept: ENDOCRINOLOGY CLINIC | Facility: CLINIC | Age: 63
End: 2023-03-07

## 2023-03-07 NOTE — TELEPHONE ENCOUNTER
Faxed over chart notes for CGM supplies to [facility]ae[facility] Esteban per their request     Faxed to [phone]    Confirmation received and documents sent to scanning

## 2023-03-13 RX ORDER — HEPARIN SODIUM (PORCINE) LOCK FLUSH IV SOLN 100 UNIT/ML 100 UNIT/ML
5 SOLUTION INTRAVENOUS ONCE
OUTPATIENT
Start: 2023-03-13

## 2023-03-13 RX ORDER — SODIUM CHLORIDE 9 MG/ML
10 INJECTION INTRAVENOUS ONCE
OUTPATIENT
Start: 2023-03-13

## 2023-03-14 ENCOUNTER — HOSPITAL ENCOUNTER (OUTPATIENT)
Dept: CT IMAGING | Facility: HOSPITAL | Age: 63
Discharge: HOME | End: 2023-03-14
Attending: INTERNAL MEDICINE
Payer: MEDICARE

## 2023-03-14 ENCOUNTER — OFFICE VISIT (OUTPATIENT)
Dept: HEMATOLOGY/ONCOLOGY | Facility: HOSPITAL | Age: 63
End: 2023-03-14
Attending: INTERNAL MEDICINE
Payer: MEDICARE

## 2023-03-14 ENCOUNTER — NURSE ONLY (OUTPATIENT)
Dept: HEMATOLOGY/ONCOLOGY | Facility: HOSPITAL | Age: 63
End: 2023-03-14
Attending: INTERNAL MEDICINE

## 2023-03-14 VITALS
HEART RATE: 84 BPM | WEIGHT: 204 LBS | SYSTOLIC BLOOD PRESSURE: 122 MMHG | TEMPERATURE: 99 F | DIASTOLIC BLOOD PRESSURE: 63 MMHG | RESPIRATION RATE: 18 BRPM | OXYGEN SATURATION: 98 % | BODY MASS INDEX: 34.83 KG/M2 | HEIGHT: 64 IN

## 2023-03-14 DIAGNOSIS — C48.2 MALIGNANT NEOPLASM OF PERITONEUM (HCC): ICD-10-CM

## 2023-03-14 DIAGNOSIS — Z90.49 H/O RESECTION OF LIVER: Primary | ICD-10-CM

## 2023-03-14 DIAGNOSIS — Z51.11 ENCOUNTER FOR CHEMOTHERAPY MANAGEMENT: ICD-10-CM

## 2023-03-14 DIAGNOSIS — C48.2 MALIGNANT NEOPLASM OF PERITONEUM (HCC): Primary | ICD-10-CM

## 2023-03-14 DIAGNOSIS — Z90.49 H/O RESECTION OF LIVER: ICD-10-CM

## 2023-03-14 DIAGNOSIS — C56.9 MALIGNANT NEOPLASM OF OVARY, UNSPECIFIED LATERALITY (HCC): ICD-10-CM

## 2023-03-14 DIAGNOSIS — E11.65 UNCONTROLLED TYPE 2 DIABETES MELLITUS WITH HYPERGLYCEMIA (HCC): ICD-10-CM

## 2023-03-14 DIAGNOSIS — Z51.81 MEDICATION MONITORING ENCOUNTER: ICD-10-CM

## 2023-03-14 DIAGNOSIS — Z45.2 ENCOUNTER FOR ADJUSTMENT OR MANAGEMENT OF VASCULAR ACCESS DEVICE: ICD-10-CM

## 2023-03-14 LAB
ALBUMIN SERPL-MCNC: 3.9 G/DL (ref 3.4–5)
ALBUMIN/GLOB SERPL: 1 {RATIO} (ref 1–2)
ALP LIVER SERPL-CCNC: 73 U/L
ALT SERPL-CCNC: 46 U/L
ANION GAP SERPL CALC-SCNC: 9 MMOL/L (ref 0–18)
AST SERPL-CCNC: 30 U/L (ref 15–37)
BASOPHILS # BLD AUTO: 0.04 X10(3) UL (ref 0–0.2)
BASOPHILS NFR BLD AUTO: 0.5 %
BILIRUB SERPL-MCNC: 1.4 MG/DL (ref 0.1–2)
BUN BLD-MCNC: 15 MG/DL (ref 7–18)
BUN/CREAT SERPL: 19 (ref 10–20)
CALCIUM BLD-MCNC: 8.9 MG/DL (ref 8.5–10.1)
CHLORIDE SERPL-SCNC: 105 MMOL/L (ref 98–112)
CHOLEST SERPL-MCNC: 112 MG/DL (ref ?–200)
CO2 SERPL-SCNC: 27 MMOL/L (ref 21–32)
CREAT BLD-MCNC: 0.79 MG/DL
CREAT UR-SCNC: 244 MG/DL
DEPRECATED RDW RBC AUTO: 39.8 FL (ref 35.1–46.3)
EOSINOPHIL # BLD AUTO: 0.12 X10(3) UL (ref 0–0.7)
EOSINOPHIL NFR BLD AUTO: 1.6 %
ERYTHROCYTE [DISTWIDTH] IN BLOOD BY AUTOMATED COUNT: 12.6 % (ref 11–15)
EST. AVERAGE GLUCOSE BLD GHB EST-MCNC: 169 MG/DL (ref 68–126)
FASTING PATIENT LIPID ANSWER: YES
GFR SERPLBLD BASED ON 1.73 SQ M-ARVRAT: 84 ML/MIN/1.73M2 (ref 60–?)
GLOBULIN PLAS-MCNC: 3.9 G/DL (ref 2.8–4.4)
GLUCOSE BLD-MCNC: 164 MG/DL (ref 70–99)
HBA1C MFR BLD: 7.5 % (ref ?–5.7)
HCT VFR BLD AUTO: 40.5 %
HDLC SERPL-MCNC: 78 MG/DL (ref 40–59)
HGB BLD-MCNC: 13.5 G/DL
IMM GRANULOCYTES # BLD AUTO: 0.02 X10(3) UL (ref 0–1)
IMM GRANULOCYTES NFR BLD: 0.3 %
LDLC SERPL CALC-MCNC: 13 MG/DL (ref ?–100)
LYMPHOCYTES # BLD AUTO: 1.6 X10(3) UL (ref 1–4)
LYMPHOCYTES NFR BLD AUTO: 21.4 %
MCH RBC QN AUTO: 29.2 PG (ref 26–34)
MCHC RBC AUTO-ENTMCNC: 33.3 G/DL (ref 31–37)
MCV RBC AUTO: 87.5 FL
MICROALBUMIN UR-MCNC: 11.4 MG/DL
MICROALBUMIN/CREAT 24H UR-RTO: 46.7 UG/MG (ref ?–30)
MONOCYTES # BLD AUTO: 0.41 X10(3) UL (ref 0.1–1)
MONOCYTES NFR BLD AUTO: 5.5 %
NEUTROPHILS # BLD AUTO: 5.29 X10 (3) UL (ref 1.5–7.7)
NEUTROPHILS # BLD AUTO: 5.29 X10(3) UL (ref 1.5–7.7)
NEUTROPHILS NFR BLD AUTO: 70.7 %
NONHDLC SERPL-MCNC: 34 MG/DL (ref ?–130)
OSMOLALITY SERPL CALC.SUM OF ELEC: 296 MOSM/KG (ref 275–295)
PLATELET # BLD AUTO: 196 10(3)UL (ref 150–450)
POTASSIUM SERPL-SCNC: 3.8 MMOL/L (ref 3.5–5.1)
PROT SERPL-MCNC: 7.8 G/DL (ref 6.4–8.2)
RBC # BLD AUTO: 4.63 X10(6)UL
SODIUM SERPL-SCNC: 141 MMOL/L (ref 136–145)
TRIGL SERPL-MCNC: 122 MG/DL (ref 30–149)
TSI SER-ACNC: 2.85 MIU/ML (ref 0.36–3.74)
VLDLC SERPL CALC-MCNC: 15 MG/DL (ref 0–30)
WBC # BLD AUTO: 7.5 X10(3) UL (ref 4–11)

## 2023-03-14 PROCEDURE — 36591 DRAW BLOOD OFF VENOUS DEVICE: CPT

## 2023-03-14 PROCEDURE — 82570 ASSAY OF URINE CREATININE: CPT

## 2023-03-14 PROCEDURE — 71260 CT THORAX DX C+: CPT | Performed by: INTERNAL MEDICINE

## 2023-03-14 PROCEDURE — 84443 ASSAY THYROID STIM HORMONE: CPT

## 2023-03-14 PROCEDURE — 80053 COMPREHEN METABOLIC PANEL: CPT

## 2023-03-14 PROCEDURE — 74177 CT ABD & PELVIS W/CONTRAST: CPT | Performed by: INTERNAL MEDICINE

## 2023-03-14 PROCEDURE — 85025 COMPLETE CBC W/AUTO DIFF WBC: CPT

## 2023-03-14 PROCEDURE — 82043 UR ALBUMIN QUANTITATIVE: CPT

## 2023-03-14 PROCEDURE — 80061 LIPID PANEL: CPT

## 2023-03-14 PROCEDURE — 99215 OFFICE O/P EST HI 40 MIN: CPT | Performed by: INTERNAL MEDICINE

## 2023-03-14 PROCEDURE — 83036 HEMOGLOBIN GLYCOSYLATED A1C: CPT

## 2023-03-14 RX ORDER — HEPARIN SODIUM (PORCINE) LOCK FLUSH IV SOLN 100 UNIT/ML 100 UNIT/ML
5 SOLUTION INTRAVENOUS ONCE
OUTPATIENT
Start: 2023-03-14

## 2023-03-14 RX ORDER — HEPARIN SODIUM (PORCINE) LOCK FLUSH IV SOLN 100 UNIT/ML 100 UNIT/ML
5 SOLUTION INTRAVENOUS ONCE
Status: COMPLETED | OUTPATIENT
Start: 2023-03-14 | End: 2023-03-14

## 2023-03-14 RX ORDER — SODIUM CHLORIDE 9 MG/ML
10 INJECTION INTRAVENOUS ONCE
OUTPATIENT
Start: 2023-03-14

## 2023-03-14 RX ADMIN — HEPARIN SODIUM (PORCINE) LOCK FLUSH IV SOLN 100 UNIT/ML 500 UNITS: 100 SOLUTION INTRAVENOUS at 12:57:00

## 2023-03-15 ENCOUNTER — TELEPHONE (OUTPATIENT)
Dept: HEMATOLOGY/ONCOLOGY | Facility: HOSPITAL | Age: 63
End: 2023-03-15

## 2023-03-15 DIAGNOSIS — C48.2 MALIGNANT NEOPLASM OF PERITONEUM (HCC): Primary | ICD-10-CM

## 2023-03-15 DIAGNOSIS — C56.9 MALIGNANT NEOPLASM OF OVARY, UNSPECIFIED LATERALITY (HCC): Primary | ICD-10-CM

## 2023-03-15 NOTE — TELEPHONE ENCOUNTER
Results of CT scan reviewed by Dr Duane De Leon:  Stable results, no evidence of metastatic disease on scan recommends 6 month f/u with labs, Dr Duane De Leon and repeat CT imaging. Patient contacted and advised of above. Jeannette Lane appreciative of call. Comes from [facility] and requests that CT and Dr Duane De Leon appt on same day, will assist with scheduling.    She will contact clinic rn with requested date in Sep/Oct and I will assist.

## 2023-03-16 ENCOUNTER — PATIENT MESSAGE (OUTPATIENT)
Dept: ENDOCRINOLOGY CLINIC | Facility: CLINIC | Age: 63
End: 2023-03-16

## 2023-03-16 ENCOUNTER — TELEMEDICINE (OUTPATIENT)
Dept: ENDOCRINOLOGY CLINIC | Facility: CLINIC | Age: 63
End: 2023-03-16

## 2023-03-16 DIAGNOSIS — E78.5 HYPERLIPIDEMIA, UNSPECIFIED HYPERLIPIDEMIA TYPE: ICD-10-CM

## 2023-03-16 DIAGNOSIS — E11.65 UNCONTROLLED TYPE 2 DIABETES MELLITUS WITH HYPERGLYCEMIA (HCC): Primary | ICD-10-CM

## 2023-03-16 DIAGNOSIS — I10 HYPERTENSION, UNSPECIFIED TYPE: ICD-10-CM

## 2023-03-16 RX ORDER — CARVEDILOL 12.5 MG/1
12.5 TABLET ORAL 2 TIMES DAILY WITH MEALS
Qty: 180 TABLET | Refills: 3 | Status: SHIPPED | OUTPATIENT
Start: 2023-03-16

## 2023-03-16 RX ORDER — ATORVASTATIN CALCIUM 40 MG/1
40 TABLET, FILM COATED ORAL NIGHTLY
Qty: 90 TABLET | Refills: 3 | Status: SHIPPED | OUTPATIENT
Start: 2023-03-16

## 2023-03-16 RX ORDER — CITALOPRAM 10 MG/1
10 TABLET ORAL DAILY
Qty: 90 TABLET | Refills: 3 | Status: SHIPPED | OUTPATIENT
Start: 2023-03-16

## 2023-03-22 ENCOUNTER — PATIENT MESSAGE (OUTPATIENT)
Dept: ENDOCRINOLOGY CLINIC | Facility: CLINIC | Age: 63
End: 2023-03-22

## 2023-03-22 DIAGNOSIS — E11.8 CONTROLLED TYPE 2 DIABETES MELLITUS WITH COMPLICATION, WITH LONG-TERM CURRENT USE OF INSULIN (HCC): Primary | ICD-10-CM

## 2023-03-22 DIAGNOSIS — Z79.4 CONTROLLED TYPE 2 DIABETES MELLITUS WITH COMPLICATION, WITH LONG-TERM CURRENT USE OF INSULIN (HCC): Primary | ICD-10-CM

## 2023-03-22 NOTE — TELEPHONE ENCOUNTER
From: [facility]  To: Sonia Dozier MD  Sent: 3/22/2023 1:45 PM CDT  Subject: Labs     Hello,   Thank you for changing my appointment to video. I am having oncology labs drawn 8-16-23 at the [facility]. Please add Dr Rizwan sanders to the list.  Thanks!

## 2023-03-22 NOTE — TELEPHONE ENCOUNTER
Dr. Candace Mcneal, patient would like labs for next appt put in the system. OV scheduled for 9/7/23.

## 2023-04-21 ENCOUNTER — TELEPHONE (OUTPATIENT)
Dept: ENDOCRINOLOGY CLINIC | Facility: CLINIC | Age: 63
End: 2023-04-21

## 2023-04-21 NOTE — TELEPHONE ENCOUNTER
Received fax from [facility], attached is pt most recent eye exam results. Placed in provider folder for further review.

## 2023-07-19 ENCOUNTER — PATIENT MESSAGE (OUTPATIENT)
Dept: ENDOCRINOLOGY CLINIC | Facility: CLINIC | Age: 63
End: 2023-07-19

## 2023-07-19 RX ORDER — INSULIN LISPRO 100 [IU]/ML
180 INJECTION, SOLUTION INTRAVENOUS; SUBCUTANEOUS DAILY
Qty: 180 ML | Refills: 1 | Status: SHIPPED | OUTPATIENT
Start: 2023-07-19 | End: 2023-07-20

## 2023-07-19 NOTE — TELEPHONE ENCOUNTER
From: Suman Cline  To: Rosalinda Tong MD  Sent: 7/19/2023 2:06 PM CDT  Subject: Humalog/Novolog    Hello! Is it possible to ask for a Humalog script? [facility] ([address]) needs pre authorization to switch from [employer]. I keep checking on [facility] and prices seem much lower using Humalog. Thanks!   Ten [place]

## 2023-07-20 ENCOUNTER — TELEPHONE (OUTPATIENT)
Dept: ENDOCRINOLOGY CLINIC | Facility: CLINIC | Age: 63
End: 2023-07-20

## 2023-07-20 RX ORDER — INSULIN LISPRO 100 [IU]/ML
180 INJECTION, SOLUTION INTRAVENOUS; SUBCUTANEOUS DAILY
Qty: 180 ML | Refills: 1 | Status: SHIPPED | OUTPATIENT
Start: 2023-07-20 | End: 2023-07-21

## 2023-07-20 NOTE — TELEPHONE ENCOUNTER
Patient asking if she can be switched to rx Humolog 90 days, instead of Novolog, [facility], thanks. Also, patient indicates a prior authorization is required. Please call to update at [phone],thanks.

## 2023-07-20 NOTE — TELEPHONE ENCOUNTER
Spoke to pharmacy, states Dx code needed for [facility]. Not sure if PA is needed. New Rx sent with Dx code. Spoke to patient regarding update, verbalized understanding and acknowledged.

## 2023-07-21 ENCOUNTER — TELEPHONE (OUTPATIENT)
Dept: ENDOCRINOLOGY CLINIC | Facility: CLINIC | Age: 63
End: 2023-07-21

## 2023-07-21 RX ORDER — INSULIN LISPRO 100 [IU]/ML
180 INJECTION, SOLUTION INTRAVENOUS; SUBCUTANEOUS DAILY
Qty: 180 ML | Refills: 1 | Status: SHIPPED | OUTPATIENT
Start: 2023-07-21 | End: 2023-07-24

## 2023-07-21 NOTE — TELEPHONE ENCOUNTER
Pharmacy states they received Rx for [facility], yesterday we sent Injection solution. Resent Rx.

## 2023-07-21 NOTE — TELEPHONE ENCOUNTER
Pt states that she does not use PENS. She needs the Rx to be sent as a 10 ML vial for a 90 day supply.

## 2023-07-21 NOTE — TELEPHONE ENCOUNTER
Pt called to speak to RN about a problem with her insulin. She states she needs vials and not pens and RX needs to say Humalog. Please call to discuss before sending RX. Current Outpatient Medications:     Insulin Lispro (HUMALOG) 100 UNIT/ML Injection Solution, Inject 180 Units as directed daily.  Via insulin pump, Disp: 180 mL, Rfl: 1

## 2023-07-24 RX ORDER — INSULIN ASPART 100 [IU]/ML
INJECTION, SOLUTION INTRAVENOUS; SUBCUTANEOUS
Qty: 180 ML | Refills: 0 | Status: SHIPPED | OUTPATIENT
Start: 2023-07-24

## 2023-07-24 NOTE — TELEPHONE ENCOUNTER
Patient calling to follow up, also PA needed per pt. Please call.  Not available to take calls from 12:15pm-2:15pm.

## 2023-07-24 NOTE — TELEPHONE ENCOUNTER
Spoke to pharmacy, states PA needed due to 13,000 unit limit for insulin.  Started via [employer], awaiting PA

## 2023-07-24 NOTE — TELEPHONE ENCOUNTER
Received denial letter from [facility] regarding Humalog-- placed in denial folder    Rx for Novolog sent to pharmacy with same instructions and dose. No Novolog side effects noted.

## 2023-07-25 NOTE — TELEPHONE ENCOUNTER
Spoke to pharmacist at [address] [facility] for [facility] [employer] went through insurance for 30-day supply with $0 co-pay    Spoke to patient to advise RX for humalog 30-day supply goes through insurance with $0 co-pay - patient stated understanding that humalog will be filled every 30 days (not 90-day) and stated in the past she was paying over $1000 for novolog so very thankful

## 2023-07-26 RX ORDER — INSULIN PUMP CONTROLLER
1 EACH MISCELLANEOUS EVERY OTHER DAY
Qty: 45 EACH | Refills: 2 | Status: SHIPPED | OUTPATIENT
Start: 2023-07-26

## 2023-08-16 ENCOUNTER — HOSPITAL ENCOUNTER (OUTPATIENT)
Dept: CT IMAGING | Facility: HOSPITAL | Age: 63
Discharge: HOME | End: 2023-08-16
Attending: INTERNAL MEDICINE

## 2023-08-16 ENCOUNTER — TELEPHONE (OUTPATIENT)
Dept: ENDOCRINOLOGY CLINIC | Facility: CLINIC | Age: 63
End: 2023-08-16

## 2023-08-16 ENCOUNTER — NURSE ONLY (OUTPATIENT)
Dept: HEMATOLOGY/ONCOLOGY | Facility: HOSPITAL | Age: 63
End: 2023-08-16
Attending: INTERNAL MEDICINE
Payer: MEDICARE

## 2023-08-16 VITALS
TEMPERATURE: 98 F | SYSTOLIC BLOOD PRESSURE: 126 MMHG | DIASTOLIC BLOOD PRESSURE: 56 MMHG | OXYGEN SATURATION: 95 % | WEIGHT: 206 LBS | BODY MASS INDEX: 35.17 KG/M2 | HEIGHT: 64 IN | RESPIRATION RATE: 18 BRPM | HEART RATE: 74 BPM

## 2023-08-16 DIAGNOSIS — C56.9 MALIGNANT NEOPLASM OF OVARY, UNSPECIFIED LATERALITY (HCC): ICD-10-CM

## 2023-08-16 DIAGNOSIS — Z79.4 CONTROLLED TYPE 2 DIABETES MELLITUS WITH COMPLICATION, WITH LONG-TERM CURRENT USE OF INSULIN (HCC): ICD-10-CM

## 2023-08-16 DIAGNOSIS — Z79.4 CONTROLLED TYPE 2 DIABETES MELLITUS WITHOUT COMPLICATION, WITH LONG-TERM CURRENT USE OF INSULIN (HCC): Primary | ICD-10-CM

## 2023-08-16 DIAGNOSIS — E11.9 CONTROLLED TYPE 2 DIABETES MELLITUS WITHOUT COMPLICATION, WITH LONG-TERM CURRENT USE OF INSULIN (HCC): Primary | ICD-10-CM

## 2023-08-16 DIAGNOSIS — C48.2 MALIGNANT NEOPLASM OF PERITONEUM (HCC): Primary | ICD-10-CM

## 2023-08-16 DIAGNOSIS — E11.8 CONTROLLED TYPE 2 DIABETES MELLITUS WITH COMPLICATION, WITH LONG-TERM CURRENT USE OF INSULIN (HCC): ICD-10-CM

## 2023-08-16 DIAGNOSIS — Z90.49 H/O RESECTION OF LIVER: ICD-10-CM

## 2023-08-16 DIAGNOSIS — E11.9 CONTROLLED TYPE 2 DIABETES MELLITUS WITHOUT COMPLICATION, WITH LONG-TERM CURRENT USE OF INSULIN (HCC): ICD-10-CM

## 2023-08-16 DIAGNOSIS — Z79.4 CONTROLLED TYPE 2 DIABETES MELLITUS WITHOUT COMPLICATION, WITH LONG-TERM CURRENT USE OF INSULIN (HCC): ICD-10-CM

## 2023-08-16 DIAGNOSIS — Z51.81 MEDICATION MONITORING ENCOUNTER: ICD-10-CM

## 2023-08-16 DIAGNOSIS — C48.2 MALIGNANT NEOPLASM OF PERITONEUM (HCC): ICD-10-CM

## 2023-08-16 LAB
ALBUMIN SERPL-MCNC: 3.5 G/DL (ref 3.4–5)
ALBUMIN/GLOB SERPL: 1 {RATIO} (ref 1–2)
ALP LIVER SERPL-CCNC: 67 U/L
ALT SERPL-CCNC: 44 U/L
ANION GAP SERPL CALC-SCNC: 8 MMOL/L (ref 0–18)
AST SERPL-CCNC: 35 U/L (ref 15–37)
BASOPHILS # BLD AUTO: 0.05 X10(3) UL (ref 0–0.2)
BASOPHILS NFR BLD AUTO: 0.8 %
BILIRUB SERPL-MCNC: 1.3 MG/DL (ref 0.1–2)
BUN BLD-MCNC: 13 MG/DL (ref 7–18)
BUN/CREAT SERPL: 15.9 (ref 10–20)
CALCIUM BLD-MCNC: 8.5 MG/DL (ref 8.5–10.1)
CHLORIDE SERPL-SCNC: 105 MMOL/L (ref 98–112)
CHOLEST SERPL-MCNC: 94 MG/DL (ref ?–200)
CO2 SERPL-SCNC: 26 MMOL/L (ref 21–32)
CREAT BLD-MCNC: 0.7 MG/DL
CREAT BLD-MCNC: 0.82 MG/DL
CREAT UR-SCNC: 206 MG/DL
DEPRECATED RDW RBC AUTO: 41.6 FL (ref 35.1–46.3)
EGFRCR SERPLBLD CKD-EPI 2021: 80 ML/MIN/1.73M2 (ref 60–?)
EGFRCR SERPLBLD CKD-EPI 2021: 97 ML/MIN/1.73M2 (ref 60–?)
EOSINOPHIL # BLD AUTO: 0.07 X10(3) UL (ref 0–0.7)
EOSINOPHIL NFR BLD AUTO: 1.1 %
ERYTHROCYTE [DISTWIDTH] IN BLOOD BY AUTOMATED COUNT: 12.9 % (ref 11–15)
EST. AVERAGE GLUCOSE BLD GHB EST-MCNC: 157 MG/DL (ref 68–126)
GLOBULIN PLAS-MCNC: 3.6 G/DL (ref 2.8–4.4)
GLUCOSE BLD-MCNC: 158 MG/DL (ref 70–99)
HBA1C MFR BLD: 7.1 % (ref ?–5.7)
HCT VFR BLD AUTO: 37.1 %
HDLC SERPL-MCNC: 57 MG/DL (ref 40–59)
HGB BLD-MCNC: 12.4 G/DL
IMM GRANULOCYTES # BLD AUTO: 0.01 X10(3) UL (ref 0–1)
IMM GRANULOCYTES NFR BLD: 0.2 %
LDLC SERPL CALC-MCNC: 17 MG/DL (ref ?–100)
LYMPHOCYTES # BLD AUTO: 1.38 X10(3) UL (ref 1–4)
LYMPHOCYTES NFR BLD AUTO: 21.8 %
MAGNESIUM SERPL-MCNC: 1.1 MG/DL (ref 1.6–2.6)
MCH RBC QN AUTO: 29.4 PG (ref 26–34)
MCHC RBC AUTO-ENTMCNC: 33.4 G/DL (ref 31–37)
MCV RBC AUTO: 87.9 FL
MICROALBUMIN UR-MCNC: 6.07 MG/DL
MICROALBUMIN/CREAT 24H UR-RTO: 29.5 UG/MG (ref ?–30)
MONOCYTES # BLD AUTO: 0.36 X10(3) UL (ref 0.1–1)
MONOCYTES NFR BLD AUTO: 5.7 %
NEUTROPHILS # BLD AUTO: 4.45 X10 (3) UL (ref 1.5–7.7)
NEUTROPHILS # BLD AUTO: 4.45 X10(3) UL (ref 1.5–7.7)
NEUTROPHILS NFR BLD AUTO: 70.4 %
NONHDLC SERPL-MCNC: 37 MG/DL (ref ?–130)
OSMOLALITY SERPL CALC.SUM OF ELEC: 291 MOSM/KG (ref 275–295)
PLATELET # BLD AUTO: 190 10(3)UL (ref 150–450)
POTASSIUM SERPL-SCNC: 3.8 MMOL/L (ref 3.5–5.1)
PROT SERPL-MCNC: 7.1 G/DL (ref 6.4–8.2)
RBC # BLD AUTO: 4.22 X10(6)UL
SODIUM SERPL-SCNC: 139 MMOL/L (ref 136–145)
TRIGL SERPL-MCNC: 111 MG/DL (ref 30–149)
TSI SER-ACNC: 2.53 MIU/ML (ref 0.36–3.74)
VLDLC SERPL CALC-MCNC: 14 MG/DL (ref 0–30)
WBC # BLD AUTO: 6.3 X10(3) UL (ref 4–11)

## 2023-08-16 PROCEDURE — 82570 ASSAY OF URINE CREATININE: CPT

## 2023-08-16 PROCEDURE — 74177 CT ABD & PELVIS W/CONTRAST: CPT | Performed by: INTERNAL MEDICINE

## 2023-08-16 PROCEDURE — 83036 HEMOGLOBIN GLYCOSYLATED A1C: CPT

## 2023-08-16 PROCEDURE — 85025 COMPLETE CBC W/AUTO DIFF WBC: CPT

## 2023-08-16 PROCEDURE — 82565 ASSAY OF CREATININE: CPT

## 2023-08-16 PROCEDURE — 80053 COMPREHEN METABOLIC PANEL: CPT

## 2023-08-16 PROCEDURE — 82043 UR ALBUMIN QUANTITATIVE: CPT

## 2023-08-16 PROCEDURE — 99215 OFFICE O/P EST HI 40 MIN: CPT | Performed by: INTERNAL MEDICINE

## 2023-08-16 PROCEDURE — 84443 ASSAY THYROID STIM HORMONE: CPT

## 2023-08-16 PROCEDURE — 36591 DRAW BLOOD OFF VENOUS DEVICE: CPT

## 2023-08-16 PROCEDURE — 80061 LIPID PANEL: CPT

## 2023-08-16 PROCEDURE — 83735 ASSAY OF MAGNESIUM: CPT

## 2023-08-16 PROCEDURE — 71260 CT THORAX DX C+: CPT | Performed by: INTERNAL MEDICINE

## 2023-08-16 RX ORDER — METHYLPREDNISOLONE SODIUM SUCCINATE 40 MG/ML
INJECTION, POWDER, LYOPHILIZED, FOR SOLUTION INTRAMUSCULAR; INTRAVENOUS
Status: DISPENSED
Start: 2023-08-16 | End: 2023-08-17

## 2023-08-16 NOTE — TELEPHONE ENCOUNTER
Please call patient to find out if she is taking magnesium. If not start Magnesium 400mg PO daily. Thanks.

## 2023-08-16 NOTE — TELEPHONE ENCOUNTER
Dr. Lorena Alvarez    Spoke to patient, verbalized understanding. States she will most likely not take magnesium supplements due to diarrhea from chemo. She's been having diarrhea since starting chemo and stated last magnesium (not in chart) was <1. She has received magnesium drip before    She stated 1.1 is a better result for her and aware of symptoms of low magnesium.

## 2023-08-16 NOTE — TELEPHONE ENCOUNTER
Dr. Valery Vance from lab called:  Component      Latest Ref Rng 8/16/2023   Magnesium, Serum      1.6 - 2.6 mg/dL 1.1 (LL)         Please advise -thanks

## 2023-08-16 NOTE — TELEPHONE ENCOUNTER
Pt calling to check if all the correct orders were placed for blood work. Pt has her blood drawn this morning. She is also requesting to test Magnesium.    Please call

## 2023-08-17 ENCOUNTER — TELEPHONE (OUTPATIENT)
Dept: HEMATOLOGY/ONCOLOGY | Facility: HOSPITAL | Age: 63
End: 2023-08-17

## 2023-08-17 DIAGNOSIS — C48.2 MALIGNANT NEOPLASM OF PERITONEUM (HCC): Primary | ICD-10-CM

## 2023-08-17 NOTE — TELEPHONE ENCOUNTER
----- Message from Millie Pascual RN sent at 8/17/2023  2:52 PM CDT -----         I was able to schedule the MRI and port for her on Friday Aug 25 (scheduling did call her about MRI, already)  Got port access at 1:45 pm that day here. I already said results will determine next steps. Ready to call her once resulted probably that Monday 8/28 if you can let her know.   c

## 2023-08-17 NOTE — TELEPHONE ENCOUNTER
CT scan results reviewed with Dr Amber Floyd. Recommendations/orders placed for MRI liver with and without contrast.  Patient contacted and advised that results of CT scan demonstrate a finding on the liver that requires closer look with MRI. Shama Mederos confirms understanding and requests assistance with scheduling. Will assist. Explained that results of MRI will not be available same day and that results of MRI will be called to her and follow up appointments, labs, imaging planned will be made at that time. Patient understands and agrees with plan. Will assist in scheduling and confirm with patient. Appreciative of call.

## 2023-08-18 NOTE — TELEPHONE ENCOUNTER
Called patient with MRI and Port about for 8/25/23 and that she will get a call once resulted with next steps. She verbalizes understanding.

## 2023-08-25 ENCOUNTER — HOSPITAL ENCOUNTER (OUTPATIENT)
Dept: MRI IMAGING | Facility: HOSPITAL | Age: 63
Discharge: HOME | End: 2023-08-25
Attending: INTERNAL MEDICINE
Payer: MEDICARE

## 2023-08-25 ENCOUNTER — NURSE ONLY (OUTPATIENT)
Dept: HEMATOLOGY/ONCOLOGY | Facility: HOSPITAL | Age: 63
End: 2023-08-25
Attending: INTERNAL MEDICINE
Payer: MEDICARE

## 2023-08-25 DIAGNOSIS — C48.2 MALIGNANT NEOPLASM OF PERITONEUM (HCC): ICD-10-CM

## 2023-08-25 DIAGNOSIS — C48.2 MALIGNANT NEOPLASM OF PERITONEUM (HCC): Primary | ICD-10-CM

## 2023-08-25 DIAGNOSIS — Z45.2 ENCOUNTER FOR ADJUSTMENT OR MANAGEMENT OF VASCULAR ACCESS DEVICE: ICD-10-CM

## 2023-08-25 PROCEDURE — 96523 IRRIG DRUG DELIVERY DEVICE: CPT

## 2023-08-25 PROCEDURE — 74183 MRI ABD W/O CNTR FLWD CNTR: CPT | Performed by: INTERNAL MEDICINE

## 2023-08-25 PROCEDURE — A9575 INJ GADOTERATE MEGLUMI 0.1ML: HCPCS | Performed by: INTERNAL MEDICINE

## 2023-08-25 RX ORDER — HEPARIN SODIUM (PORCINE) LOCK FLUSH IV SOLN 100 UNIT/ML 100 UNIT/ML
5 SOLUTION INTRAVENOUS ONCE
Status: COMPLETED | OUTPATIENT
Start: 2023-08-25 | End: 2023-08-25

## 2023-08-25 RX ORDER — HEPARIN SODIUM (PORCINE) LOCK FLUSH IV SOLN 100 UNIT/ML 100 UNIT/ML
5 SOLUTION INTRAVENOUS ONCE
OUTPATIENT
Start: 2023-08-25

## 2023-08-25 RX ORDER — GADOTERATE MEGLUMINE 376.9 MG/ML
15 INJECTION INTRAVENOUS
Status: COMPLETED | OUTPATIENT
Start: 2023-08-25 | End: 2023-08-25

## 2023-08-25 RX ORDER — SODIUM CHLORIDE 9 MG/ML
10 INJECTION INTRAVENOUS ONCE
OUTPATIENT
Start: 2023-08-25

## 2023-08-25 RX ADMIN — GADOTERATE MEGLUMINE 15 ML: 376.9 INJECTION INTRAVENOUS at 14:10:00

## 2023-08-25 RX ADMIN — HEPARIN SODIUM (PORCINE) LOCK FLUSH IV SOLN 100 UNIT/ML 500 UNITS: 100 SOLUTION INTRAVENOUS at 14:41:00

## 2023-08-29 ENCOUNTER — TELEPHONE (OUTPATIENT)
Dept: HEMATOLOGY/ONCOLOGY | Facility: HOSPITAL | Age: 63
End: 2023-08-29

## 2023-08-29 DIAGNOSIS — C48.2 MALIGNANT NEOPLASM OF PERITONEUM (HCC): Primary | ICD-10-CM

## 2023-09-05 ENCOUNTER — TELEPHONE (OUTPATIENT)
Dept: HEMATOLOGY/ONCOLOGY | Facility: HOSPITAL | Age: 63
End: 2023-09-05

## 2023-09-05 ENCOUNTER — VIRTUAL PHONE E/M (OUTPATIENT)
Dept: SURGERY | Facility: CLINIC | Age: 63
End: 2023-09-05
Payer: MEDICARE

## 2023-09-05 DIAGNOSIS — C48.2 PRIMARY PERITONEAL CARCINOMATOSIS (HCC): Primary | ICD-10-CM

## 2023-09-05 PROCEDURE — 99443 PHONE E/M BY PHYS 21-30 MIN: CPT | Performed by: SURGERY

## 2023-09-05 NOTE — TELEPHONE ENCOUNTER
Patient calling papito ask if [address] [place] [address] has any opinions after seeing her MRI?

## 2023-09-06 NOTE — PROGRESS NOTES
Virtual Telephone Check-In    Claudia Savage verbally consents to a Virtual/Telephone Check-In visit on 09/06/23. Patient has been referred to the [facility] website at [email or web address] to review the yearly Consent to Treat document. Patient understands and accepts financial responsibility for any deductible, co-insurance and/or co-pays associated with this service. Duration of the service: 35 minutes which includes review of records and imaging      Summary of topics discussed:   Patient is known to me. She is a [phone] female with a history of papillary serous carcinoma for which she underwent cytoreductive surgery and HIPEC in March 2015 and was diagnosed with a recurrence in November 2020 for which she underwent partial hepatectomy and cholecystectomy by myself. Below is a detailed oncological history. Patient follows with Dr. Luther Wood. And has been on letrozole without progression until recently. She underwent surveillance CT scan which showed an enlarging lesion in the left anterior hepatic lobe. This was followed by an MRI which is also reviewed below that showed perihepatic capsular nodular implant which were new from previous exams concerning for peritoneal metastatic lesions. Patient feels well otherwise and is in good spirits. Oncologic history:  1999: Patient underwent OSIEL/BSO for fibroids. Pathology showed right ovary with papillary serous cystadenoma. She remained well until 2013 2013: she presented with abdominal pain. 12/2013: She was taken to the OR by Dr. Jeovany Manriquez for laparoscopic appendectomy and small bowel resection. Peritoneal implants were noted, and final pathology showed ovarian invasive implants with pasammoma bodies. 8/2014: She was seen by Dr. Jericho Chowdhury and completed 6 cycles of carbo/taxol. 1/15/2015: repeat CT revealed new omental/mesenteric tumors. 3/31/2015 :  She was taken to the OR by Dr. Julia Duffy for a cytoreductive surgery to include peritonectomy, en bloc small bowel resection, partial colectomy, omentectomy, partial resection of abdominal wall, partial cystectomy, small bowel resection, low anterior resection, partial resection of right diaphragm with repair, HIPEC with cisplatin and doxorubicin, diverting loop ileostomy. PCI 12, CC-0 achieved. 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 7/21/2015: ileostomy taken down. Pathology from small bowel showed metastatic papillary serous carcinoma. 8/20/2015: CT showed progression of peritoneal disease  8/2015-10/2016: she completed 12 cycles of chemotherapy with topocetan and bevacizumab.  9/2016: Patient was evaluated by Dr. Ramos Colindres at [facility]. Staining of previous pathology was found to be ER positive. She was started on letrozole 10/2016.   3/2017: Repeat CT showed good response to treatment. Her disease has remained stable. 12/20/2018: CT showed a 5 x 8 mm soft tissue focus ventral to left lobe of liver which was not present on the prior exam. This remained stable until 12/2019, when lesion increased in size. Most recent CT on 9/15/2020 showed a 32 x 15 x 22 mm omental tumor nodule abutting capsular margin of left lobe of liver previously measured 18 x 11 x 17 mm. It also demonstrated a peripherally calcified dependent pelvic lesion measuring 2.4 x 1.1 cm corresponds to a remote probably fat containing 2.9 x 1.3 cm lesion and either represents jules fat necrosis or a partially treated metastatic lesion. 11/2/2020: patient taken to the operating room for an exploratory laparotomy, partial hepatectomy segment 3, cholecystectomy. She tolerated procedure well. Pathology of liver returned to show low-grade serous carcinoma, measuring 3.3 cm in greatest dimension, involving hepatic serosal surface without invasion into hepatic parenchyma. Pathology of gallbladder showed low-grade serous carcinoma involving the gallbladder subserosa, chronic cholecystitis with cholelithiasis.       Imaging review:  PROCEDURE: MRI LIVER (W+WO) (TND=[id number])     COMPARISON: US GALL BLADDER, 6/03/2013, 1:32 PM.  [facility], MRI ABDOMEN [address], 6/09/2013, 6:19 PM.  CT ABD/PELV WO CON FOR APPY, 12/03/2013, 11:27 AM.  CT CHEST ABD PELV W CONTRAST, 1/15/2015, 11:34 AM.  CT, CT CHEST CS/ABD  CS/PLV C (SET), 1/15/2015, 11:34 AM.  MR, MRI ABDOMEN+PELVIS (ALL W+WO) (CPT=74183/43818), 3/16/2015, 11:26 AM.  CT ABDOMEN & PELVIS W CON, 6/21/2015, 10:09 AM.  CT CHEST ABD PELV W CONTRAST, 8/20/2015, 12:22 PM.  CT CHEST+ABDOMEN+PELVIS(ALL CNTRST  ONLY)(CPT=71260/81530), 1/09/2018, 2:15 PM.  CT CHEST+ABDOMEN+PELVIS(ALL CNTRST ONLY)(CPT=71260/01317), 8/23/2017, 11:51 AM.  CT CHEST+ABDOMEN+PELVIS(ALL CNTRST ONLY)(CPT=71260/29390), 3/30/2017, 12:41 PM.  CT CHEST+ABDOMEN+PELVIS(ALL CNTRST  ONLY)(CPT=71260/36493), 11/03/2016, 3:08 PM.  CT CHEST ABD PELV W CONTRAST, 8/09/2016, 2:32 PM.  CT CHEST ABD PELV W CONTRAST, 4/19/2016, 1:23 PM.  CT CHEST ABD PELV W CONTRAST, 1/26/2016, 2:11 PM.  CT CHEST ABD PELV W CONTRAST, 11/03/2015, 12:52 PM.  CT   CHEST+ABDOMEN+PELVIS(ALL CNTRST ONLY)(CPT=71260/65913), 4/12/2019, 1:14 PM.  CT CHEST+ABDOMEN+PELVIS(ALL CNTRST ONLY)(CPT=71260/79647), 12/20/2018, 11:04 AM.  CT CHEST+ABDOMEN+PELVIS(ALL CNTRST ONLY)(CPT=71260/62034), 6/05/2018, 1:02 PM.  CT  CHEST+ABDOMEN+PELVIS(ALL CNTRST ONLY)(CPT=71260/32051), 9/19/2022, 1:04 PM.  CT CHEST+ABDOMEN+PELVIS(ALL CNTRST ONLY)(CPT=71260/01956), 2/08/2022, 12:53 PM.  CT CHEST+ABDOMEN+PELVIS(ALL CNTRST ONLY)(CPT=71260/90760), 7/20/2021, 12:59 PM.  CT  CHEST+ABDOMEN+PELVIS(ALL CNTRST ONLY)(CPT=71260/08006), 9/15/2020, 1:20 PM.  CT CHEST+ABDOMEN+PELVIS(ALL CNTRST ONLY)(CPT=71260/75287), 3/10/2020, 1:20 PM.  [facility] for [facility], CT CHEST+ABDOMEN+PELVIS(ALL CNTRST ONLY)(CPT=71260/02765),  3/14/2023, 12:42 PM.  [facility] for [facility], CT CHEST+ABDOMEN+PELVIS(ALL CNTRST ONLY)(CPT=71260/04274), 8/16/2023, 11:37 AM.  [address] [facility], MRI LIVER (W+WO) (LAB=[id number]), 6/03/2019, 6:06 PM.     INDICATIONS: Metastatic primary carcinoma of the peritoneum, status post cytoreductive surgery, peritonectomy, en bloc small-bowel resection and anastomosis, partial colectomy, omentectomy, partial resection of the abdominal wall, partial cystectomy,  partial right hemidiaphragmatic resection, low anterior resection with anastomosis, and hyperthermic intraperitoneal chemotherapy (03/2015), and multiple cycles of subsequent chemotherapy (C48.2). TECHNIQUE: A comprehensive MRI examination of the abdomen was performed utilizing a variety of imaging planes and imaging parameters to optimize visualization of suspected pathology. Images were obtained both before and after intravenous gadolinium  injection. FINDINGS:  LUNG BASES: There is dependent subsegmental signal abnormality likely reflecting atelectasis bilaterally. There epicardial lymph nodes measuring 0.7 x 1.0 cm and 2.6 x 0.9 cm (series 5, images 11 and 13). LIVER: There is pronounced loss of signal intensity on the out-of-phase images relative to the in-phase sequence, compatible with hepatic steatosis. Numerous well-circumscribed T2 hyperintense lesions are seen. These are too small to fully characterize,  but likely represent cysts. Along the right hepatic lobe, there is anterior capsular T2 hyperintense 4.0 x 1.8 cm lesion which has increased in conspicuity from more remote studies (series 5, image 11). A capsular lesion along the anterior margin left hepatic lobe measures 2.3 x  1.4 cm (series 5, image 13). Additional pericapsular hepatic lesions are seen measuring 1.8 x 1.2 cm, 1.8 x 1.0 cm, and, along the eduardo hepatis, 1.6 x 1.9 cm (series 5, images 18 and 19, respectively). There is a nonenhancing previously treated lesion in the anterior subcapsular aspect of the left hepatic lobe measuring up to 2.9 x 1.1 cm.   Susceptibility artifact from surgical clips is present along the inferior hepatic margin and eduardo hepatis. Additional susceptibility artifact is seen along the right hepatic dome, presumably related to prior right hemidiaphragmatic surgical resection. BILIARY: Cholelithiasis is present with innumerable tiny calculi throughout the gallbladder lumen. There is no intraluminal dilatation, gallbladder wall thickening, or pericholecystic inflammatory stranding. No intrahepatic or extrahepatic biliary  dilatation is apparent. PANCREAS: There is preservation of normal signal intensity on precontrast T1-weighted, fat-saturated images. No focal mass or main pancreatic duct dilatation is seen. There is no evidence of pancreatitis. SPLEEN: No enlargement. ADRENALS: Unremarkable, without focal mass or enlargement. KIDNEYS: No hydronephrosis or solid masses are apparent. VASCULATURE: The portal vein, IVC, splenic, hepatic, renal veins, and mesenteric veins are patent. LYMPH NODES: Borderline retroperitoneal lymphadenopathy is noted, unchanged from multiple remote examinations. ABDOMINAL WALL: Foci of susceptibility artifact in the anterior abdominal wall in the periumbilical region and obliquely in the right lower quadrant are likely related to prior operative intervention. Numerous loops of bowel are closely apposed to the ventral abdominal wall, suggesting underlying adhesion formation. Minimal left paramedian protrusion of bowel is seen. There is generalized laxity of the ventral abdominal wall. BONES: Suboptimally assessed by scan protocol, but without grossly apparent bony lesion or fracture. Mild scoliosis of the thoracolumbar spine is present. Multilevel endplate signal abnormalities are likely degenerative nature. OTHER: A small hiatal hernia is evident. Slight nodularity along the greater curvature of the stomach appears unchanged. There is also slight nodularity of the splenic flexure of the colon, grossly similar to previous.   No loculated fluid collections are appreciated. Impression   CONCLUSION:  1. Multiple perihepatic capsular nodular implants are apparent, new from previous exams. These are concerning for new peritoneal metastatic lesions. 2. Nonspecific epicardial lymph nodes are partially delineated. 3. Marked hepatic steatosis. 4. Cholelithiasis without MR evidence of acute complication. 5. Stable borderline-sized retroperitoneal lymph nodes. 6. Lesser incidental findings as above. Dictated by (CST): Eboni Holliday MD on 8/25/2023 at 3:34 PM      Finalized by (CST): Eboni Holliday MD on 8/25/2023 at 3:56 PM           PROCEDURE: CT CHEST ABDOMEN PELVIS (ALL CONTRAST ONLY) (NHN=[id number])     COMPARISON: [facility], CT CHEST+ABDOMEN+PELVIS(ALL CNTRST ONLY)(CPT=71260/59815), 7/20/2021, 12:59 PM.  [facility], MRI LIVER (W+WO) (BOT=[id number]), 6/03/2019, 6:06 PM.  [facility], CT  CHEST+ABDOMEN+PELVIS(ALL CNTRST ONLY)(CPT=71260/57801), 3/14/2023, 12:42 PM.  [facility], CT CHEST+ABDOMEN+PELVIS(ALL CNTRST ONLY)(CPT=71260/51181), 9/19/2022, 1:04 PM.     INDICATIONS: History of ovarian and peritoneal neoplasms with liver resection. TECHNIQUE:   CT images of the chest, abdomen and pelvis were obtained with intravenous contrast material.  Automated exposure control for dose reduction was used. Adjustment of the mA and/or kV was done based on the patient's size. Use of iterative  reconstruction technique for dose reduction was used. Dose information is transmitted to the [facility]) NRDR ([address]) which includes the Dose Index Registry. FINDINGS:  SUPPORT DEVICES: Left chest wall central venous port catheter terminates in the SVC. CARDIAC:   The heart is normal size. Coronary atherosclerosis. AORTA: Normal caliber for age. Mild calcified atherosclerosis.   VASCULATURE:   No large central thrombus. The main pulmonary artery is normal diameter. MEDIASTINUM/GABRIEL:   No mass or enlarged adenopathy. Mildly prominent cardiophrenic lymph nodes are again seen. ESOPHAGUS: Unremarkable. NECK BASE: No enlarged lymph nodes. CHEST WALL/AXILLAE: No enlarged lymph nodes. BONES: No acute fracture. No aggressive appearing osseous lesion. Mild degenerative changes of the thoracic spine. DIAPHRAGM: Unremarkable. PLEURA:   No mass or effusion. LUNGS:   The trachea and central airways are clear. There are no acute consolidations. LIVER:    Stable 6 mm circumscribed hypodensity in the lateral right hepatic lobe series 2, image 80. Stable 3.0 x 1.3 cm subcapsular low-density focus along the ventral margin of the left hepatic lobe series 2, image 81. Hyperenhancing mass in the anterior left lobe is seen measuring 2.6 x 2.1 cm series 2, image 69 which was subtle or not present on prior imaging. GALLBLADDER: Surgically absent. BILIARY:   No visible dilatation or calcification. PANCREAS:   No lesion, fluid collection, ductal dilatation, or atrophy. SPLEEN:   No enlargement or focal lesion. ADRENALS:   No mass or enlargement. KIDNEYS:   No mass, obstruction, or calcification. RETROPERITONEUM:   No mass or enlarged adenopathy. AORTA/VASCULAR:   No aneurysm. Mild calcified atherosclerosis. PERITONEUM: No free fluid or air. Stable calcifications in the left lower quadrant series 2, image 140, series 2, image 142. Postsurgical changes of omentectomy. BOWEL/MESENTERY:   No visible mass, obstruction, or bowel wall thickening. There are postsurgical changes of multiple bowel resections. URINARY BLADDER: Unremarkable. REPRODUCTIVE ORGANS: The uterus is surgically absent. There is a stable calcification at the surgical bed measuring 2.3 x 1.3 cm series 2, image 177. ABDOMINAL WALL:   No acute abnormality. BONES: No acute fracture.   No aggressive appearing osseous lesion. Mild degenerative changes of the lumbar spine. Impression   CONCLUSION:     1. There is a history of metastatic peritoneal carcinoma. There is a new or enlarging lesion in the anterior left hepatic lobe, which is indeterminate. Recommend further evaluation with MRI of the abdomen with and without contrast.     2. Stable small calcified peritoneal nodules and subcapsular liver lesions likely relate to stable treated disease. 3. Additional chronic or incidental findings are described in the body of this report. Dictated by (CST): Sharon Barnhart MD on 8/16/2023 at 1:37 PM      Finalized by (CST): Sharon Barnhart MD on 8/16/2023 at 2:06 PM       Assessment and plan:  History of metastatic papillary serous carcinoma. Initially diagnosed around 10 years ago. Status post cytoreductive surgery and more recently partial hepatectomy for recurrence. Now presents with another recurrence. We will review her case in our multidisciplinary tumor board. Discussed with medical oncology whether there are viable systemic options. From a technical standpoint, her disease remains resectable and I do believe that this is an option for her. I will [place] back with her once we presented her case in tumor board.   All questions were answered    Briana Staley MD

## 2023-09-06 NOTE — TELEPHONE ENCOUNTER
Rae Rodriguez contacted. Explained that Dr Edison Keith recommends she sees Dr Sergio Gonzalez at [facility] to determine her recommendations. Rae Rodriguez is reluctant to do so due to travel and lengthy visits with previous visit. Suggested she try to see if they would set up a video or telephone visit as they could access her records and imaging and she has been a previous patient. At least try and see if this would be available. Case will be presented at Tumor board next week and per recommendations, will contact her. She was appreciative of call back.

## 2023-09-07 ENCOUNTER — TELEMEDICINE (OUTPATIENT)
Dept: ENDOCRINOLOGY CLINIC | Facility: CLINIC | Age: 63
End: 2023-09-07

## 2023-09-07 DIAGNOSIS — I10 HYPERTENSION, UNSPECIFIED TYPE: ICD-10-CM

## 2023-09-07 DIAGNOSIS — E11.8 CONTROLLED TYPE 2 DIABETES MELLITUS WITH COMPLICATION, WITH LONG-TERM CURRENT USE OF INSULIN (HCC): Primary | ICD-10-CM

## 2023-09-07 DIAGNOSIS — E78.5 HYPERLIPIDEMIA, UNSPECIFIED HYPERLIPIDEMIA TYPE: ICD-10-CM

## 2023-09-07 DIAGNOSIS — Z79.4 CONTROLLED TYPE 2 DIABETES MELLITUS WITH COMPLICATION, WITH LONG-TERM CURRENT USE OF INSULIN (HCC): Primary | ICD-10-CM

## 2023-09-07 PROCEDURE — 99214 OFFICE O/P EST MOD 30 MIN: CPT | Performed by: INTERNAL MEDICINE

## 2023-09-07 NOTE — PROGRESS NOTES
This visit is conducted using Telemedicine with live, interactive video and audio. Name: Trista Soliz  Date: 9/7/2023    Referring Physician: No ref. provider found    HISTORY OF PRESENT ILLNESS   Trista Soliz is a 60year old female who presents for diabetes mellitus. She is now maintained on letrozole for ovarian metastatic CA, appetite is improved and she is gaining weight. Prior HbA, C or glycohemoglobin were 11.8 3/2014; 10.8% 9/2014; 8.1% 3/2015; 6.5% 1/2016; 7.0% 6/2017; 9.1% 8/2018; 8.8% 7/2019; 10.0% 2/2020; 9.0% 10/2020; 8.1% 3/2022; 6.6% 7/2022; 6.2% 11/2022; 7.5% 3/2023; 7.1% POC Today     Dietary compliance: Good  Exercise: No  Polyuria/polydipsia: No  Blurred vision: No    Episodes of hypoglycemia: No  Blood Glucose:  Checking 4 times per day     Medications:  Omnipod     Basal:  12A 1.7  5A 2.0  9A 1.9    I:CR 3  Sensitivity 20  Active Insulin Time 4 hours    Dexcom d/c'd due to tape allergy     REVIEW OF SYSTEMS  Eyes: Diabetic retinopathy present: No            Most recent visit to eye doctor in last 12 months: Yes    CV: Cardiovascular disease present: No         Hypertension present: Yes         Hyperlipidemia present: Yes         Peripheral Vascular Disease present: No    : Nephropathy present: No    Neuro: Neuropathy present: No    Skin: Infection or ulceration: No    Osteoporosis: No    Thyroid disease: No      Medications:     Current Outpatient Medications:     Insulin Lispro 100 UNIT/ML Injection Solution, INJECT 180 UNITS AS DIRECTED DAILY. VIA INSULIN PUMP, Disp: , Rfl:     Insulin Disposable Pump (OMNIPOD DASH PODS, GEN 4,) Does not apply Misc, 1 Device every other day., Disp: 45 each, Rfl: 2    carvedilol 12.5 MG Oral Tab, Take 1 tablet (12.5 mg total) by mouth 2 (two) times daily with meals. , Disp: 180 tablet, Rfl: 3    atorvastatin 40 MG Oral Tab, Take 1 tablet (40 mg total) by mouth nightly., Disp: 90 tablet, Rfl: 3    citalopram 10 MG Oral Tab, Take 1 tablet (10 mg total) by mouth daily. , Disp: 90 tablet, Rfl: 3    NOVOLOG 100 UNIT/ML Injection Solution, INJECT VIA INSULIN PUMP, MAXIMUM  UNITS DAILY DX: E11.65, Disp: 180 mL, Rfl: 1    LETROZOLE 2.5 MG Oral Tab, TAKE 1 TABLET BY MOUTH EVERY DAY, Disp: 90 tablet, Rfl: 1     Allergies:     Adhesive Tape           RASH, ITCHING    Comment:PLEASE USE MEPORE DRESSING AND A 3M STERI STRIP TO             SECURE PORT. Pt itches, might rash with tegaderm             and strips inside port kit. Social History:   Social History    Socioeconomic History      Marital status:     Tobacco Use      Smoking status: Former        Packs/day: 1.00        Years: 15.00        Pack years: 15        Types: Cigarettes        Quit date: 1988        Years since quittin.7      Smokeless tobacco: Never    Vaping Use      Vaping Use: Never used    Substance and Sexual Activity      Alcohol use: No      Drug use: No    Other Topics      Concerns:        Caffeine Concern: No      Medical History:   Past Medical History:   Diagnosis Date    Anxiety 5 years    During Cancer Diagnosis    Appendicitis     Chronic diarrhea     Depression 5years    Since cancer diagnosis    Fibroids     Fibroids/Cystadenoma Right Ovary; Management:  OSIEL/BSO; was on HET x5 years and stopped    High blood pressure     High cholesterol     Injury of right foot 2001    Soft tissue injury, right foot    Neuropathy     Bilateral feet    Osteoarthritis     feet, ankles, and shoulders    Ovarian cancer ([address]     peritoneal and bowel implants    Pancreatitis     ERCP with stent    Personal history of antineoplastic chemotherapy -2014    Chemo    Primary peritoneal carcinomatosis ([address] 2014    \"Psamomma carcinoma/Peritonal implants; Management:   In progress\"    Type 1 diabetes mellitus ([address]     Vertigo     Visual impairment     glasses for distance and reading       Surgical history:   Past Surgical History:   Procedure Laterality Date    APPENDECTOMY  12/2013    BREAST BIOPSY Left     x's 2 benign    COLONOSCOPY      HYSTERECTOMY  1999    OSIEL/BSO     PARISH LOCALIZATION WIRE 1 SITE LEFT (CPT=19281)      OTHER SURGICAL HISTORY  03/31/2015    Cytoreductive surgery with peritonectomy,   En bloc small bowel resection, partial colectomy, partial cystectomy, partial resection of abdominal wall (8 cm), and omentectomy,  Small bowel resection with primary anastomosis, Mobilization of the splenic flexure, Low anterior resection with colorectal anastomosis, Hyperthermic intraperitoneal chemotherapy with cisplatin and doxorubicin    OTHER SURGICAL HISTORY  07/21/2015    Ileostomy takedown,   Left subclavian vein single-lumen port placement with fluoroscopic guidance. PORT, INDWELLING, IMP      Power port    UPPER GI ENDOSCOPY - REFERRAL  2013       PHYSICAL EXAM  General Appearance:  alert, well developed, in no acute distress  Eyes:  normal conjunctivae, sclera. , normal sclera and normal pupils  Throat/Neck: normal sound to voice. Back: no kyphosis  Respiratory:  non-labored. no increased work of breathing. Lymph Nodes:  No abnormal nodes noted  Skin:  normal moisture and skin texture  Hematologic:  no excessive bruising  Psychiatric:  oriented to time, self, and place    ASSESSMENT/PLAN:      1.  Diabetes Mellitus, Type 2, controlled  -controlled; HgA1c 7.1% -->improved   -Overall blood glucose   -Congratulated patient on improved BG Levels   -Discussed importance of glycemic control to prevent complications of diabetes  -Discussed complications of diabetes include retinopathy, neuropathy, nephropathy and cardiovascular disease  -Discussed importance of SBGM  -Discussed importance of CHO counting  -Continue current pump settings  -Overall BG levels are improved   -Dexcom discontinued to significant tape allergy   -Normotensive  -Normal lipids    RTC 6 months - will do video visit     9/7/2023  Vern Enciso MD

## 2023-09-22 DIAGNOSIS — C48.2 MALIGNANT NEOPLASM OF PERITONEUM (HCC): ICD-10-CM

## 2023-09-22 DIAGNOSIS — Z45.2 ENCOUNTER FOR ADJUSTMENT OR MANAGEMENT OF VASCULAR ACCESS DEVICE: Primary | ICD-10-CM

## 2023-09-22 DIAGNOSIS — Z90.49 H/O RESECTION OF LIVER: Primary | ICD-10-CM

## 2023-09-22 DIAGNOSIS — Z90.49 H/O RESECTION OF LIVER: ICD-10-CM

## 2023-09-22 DIAGNOSIS — Z51.81 MEDICATION MONITORING ENCOUNTER: ICD-10-CM

## 2023-09-25 ENCOUNTER — TELEPHONE (OUTPATIENT)
Dept: HEMATOLOGY/ONCOLOGY | Facility: HOSPITAL | Age: 63
End: 2023-09-25

## 2023-09-25 NOTE — TELEPHONE ENCOUNTER
Called patient with requested reschedule of apts. She still not wanting to go the times able to get. Requesting a call back from [place].

## 2023-09-27 ENCOUNTER — TELEPHONE (OUTPATIENT)
Dept: SURGERY | Facility: CLINIC | Age: 63
End: 2023-09-27

## 2023-09-27 NOTE — TELEPHONE ENCOUNTER
Called patient to follow-up on patient's decision to proceed or not to proceed with the HIPEC. No answer, left voice message advising patient to call back. Will follow-up.

## 2023-10-10 DIAGNOSIS — C48.2 MALIGNANT NEOPLASM OF PERITONEUM (HCC): ICD-10-CM

## 2023-10-11 DIAGNOSIS — C48.2 MALIGNANT NEOPLASM OF PERITONEUM (HCC): ICD-10-CM

## 2023-10-11 RX ORDER — LETROZOLE 2.5 MG/1
TABLET, FILM COATED ORAL
Qty: 90 TABLET | Refills: 1 | Status: SHIPPED | OUTPATIENT
Start: 2023-10-11

## 2023-10-11 RX ORDER — LETROZOLE 2.5 MG/1
2.5 TABLET, FILM COATED ORAL DAILY
Qty: 90 TABLET | Refills: 1 | Status: CANCELLED | OUTPATIENT
Start: 2023-10-11

## 2023-11-07 ENCOUNTER — NURSE ONLY (OUTPATIENT)
Dept: HEMATOLOGY/ONCOLOGY | Facility: HOSPITAL | Age: 63
End: 2023-11-07
Attending: INTERNAL MEDICINE
Payer: MEDICARE

## 2023-11-07 VITALS
WEIGHT: 202 LBS | HEART RATE: 85 BPM | BODY MASS INDEX: 34.49 KG/M2 | RESPIRATION RATE: 18 BRPM | TEMPERATURE: 99 F | OXYGEN SATURATION: 97 % | DIASTOLIC BLOOD PRESSURE: 73 MMHG | SYSTOLIC BLOOD PRESSURE: 138 MMHG | HEIGHT: 64 IN

## 2023-11-07 DIAGNOSIS — C56.9 MALIGNANT NEOPLASM OF OVARY, UNSPECIFIED LATERALITY (HCC): Primary | ICD-10-CM

## 2023-11-07 DIAGNOSIS — Z45.2 ENCOUNTER FOR ADJUSTMENT OR MANAGEMENT OF VASCULAR ACCESS DEVICE: ICD-10-CM

## 2023-11-07 DIAGNOSIS — C48.2 MALIGNANT NEOPLASM OF PERITONEUM (HCC): Primary | ICD-10-CM

## 2023-11-07 DIAGNOSIS — Z51.11 ENCOUNTER FOR CHEMOTHERAPY MANAGEMENT: ICD-10-CM

## 2023-11-07 DIAGNOSIS — C78.7 MALIGNANT NEOPLASM METASTATIC TO LIVER (HCC): ICD-10-CM

## 2023-11-07 DIAGNOSIS — Z51.81 MEDICATION MONITORING ENCOUNTER: ICD-10-CM

## 2023-11-07 DIAGNOSIS — Z90.49 H/O RESECTION OF LIVER: ICD-10-CM

## 2023-11-07 LAB
ALBUMIN SERPL-MCNC: 4.3 G/DL (ref 3.2–4.8)
ALBUMIN/GLOB SERPL: 1.3 {RATIO} (ref 1–2)
ALP LIVER SERPL-CCNC: 70 U/L
ALT SERPL-CCNC: 27 U/L
ANION GAP SERPL CALC-SCNC: 6 MMOL/L (ref 0–18)
AST SERPL-CCNC: 28 U/L (ref ?–34)
BASOPHILS # BLD AUTO: 0.04 X10(3) UL (ref 0–0.2)
BASOPHILS NFR BLD AUTO: 0.6 %
BILIRUB SERPL-MCNC: 1.3 MG/DL (ref 0.2–1.1)
BUN BLD-MCNC: 13 MG/DL (ref 9–23)
BUN/CREAT SERPL: 17.1 (ref 10–20)
CALCIUM BLD-MCNC: 8.8 MG/DL (ref 8.7–10.4)
CHLORIDE SERPL-SCNC: 106 MMOL/L (ref 98–112)
CO2 SERPL-SCNC: 26 MMOL/L (ref 21–32)
CREAT BLD-MCNC: 0.76 MG/DL
DEPRECATED RDW RBC AUTO: 40.4 FL (ref 35.1–46.3)
EGFRCR SERPLBLD CKD-EPI 2021: 87 ML/MIN/1.73M2 (ref 60–?)
EOSINOPHIL # BLD AUTO: 0.08 X10(3) UL (ref 0–0.7)
EOSINOPHIL NFR BLD AUTO: 1.2 %
ERYTHROCYTE [DISTWIDTH] IN BLOOD BY AUTOMATED COUNT: 13 % (ref 11–15)
GLOBULIN PLAS-MCNC: 3.2 G/DL (ref 2.8–4.4)
GLUCOSE BLD-MCNC: 173 MG/DL (ref 70–99)
HCT VFR BLD AUTO: 38.6 %
HGB BLD-MCNC: 12.7 G/DL
IMM GRANULOCYTES # BLD AUTO: 0.03 X10(3) UL (ref 0–1)
IMM GRANULOCYTES NFR BLD: 0.4 %
LYMPHOCYTES # BLD AUTO: 1.36 X10(3) UL (ref 1–4)
LYMPHOCYTES NFR BLD AUTO: 19.8 %
MCH RBC QN AUTO: 28.2 PG (ref 26–34)
MCHC RBC AUTO-ENTMCNC: 32.9 G/DL (ref 31–37)
MCV RBC AUTO: 85.6 FL
MONOCYTES # BLD AUTO: 0.41 X10(3) UL (ref 0.1–1)
MONOCYTES NFR BLD AUTO: 6 %
NEUTROPHILS # BLD AUTO: 4.96 X10 (3) UL (ref 1.5–7.7)
NEUTROPHILS # BLD AUTO: 4.96 X10(3) UL (ref 1.5–7.7)
NEUTROPHILS NFR BLD AUTO: 72 %
OSMOLALITY SERPL CALC.SUM OF ELEC: 290 MOSM/KG (ref 275–295)
PLATELET # BLD AUTO: 227 10(3)UL (ref 150–450)
POTASSIUM SERPL-SCNC: 3.7 MMOL/L (ref 3.5–5.1)
PROT SERPL-MCNC: 7.5 G/DL (ref 5.7–8.2)
RBC # BLD AUTO: 4.51 X10(6)UL
SODIUM SERPL-SCNC: 138 MMOL/L (ref 136–145)
WBC # BLD AUTO: 6.9 X10(3) UL (ref 4–11)

## 2023-11-07 PROCEDURE — 36591 DRAW BLOOD OFF VENOUS DEVICE: CPT

## 2023-11-07 PROCEDURE — 85025 COMPLETE CBC W/AUTO DIFF WBC: CPT

## 2023-11-07 PROCEDURE — 80053 COMPREHEN METABOLIC PANEL: CPT

## 2023-11-07 PROCEDURE — 99215 OFFICE O/P EST HI 40 MIN: CPT | Performed by: INTERNAL MEDICINE

## 2023-11-07 RX ORDER — HEPARIN SODIUM (PORCINE) LOCK FLUSH IV SOLN 100 UNIT/ML 100 UNIT/ML
5 SOLUTION INTRAVENOUS ONCE
OUTPATIENT
Start: 2023-11-07

## 2023-11-07 RX ORDER — HEPARIN SODIUM (PORCINE) LOCK FLUSH IV SOLN 100 UNIT/ML 100 UNIT/ML
5 SOLUTION INTRAVENOUS ONCE
Status: COMPLETED | OUTPATIENT
Start: 2023-11-07 | End: 2023-11-07

## 2023-11-07 RX ORDER — SODIUM CHLORIDE 9 MG/ML
10 INJECTION INTRAVENOUS ONCE
OUTPATIENT
Start: 2023-11-07

## 2023-11-07 RX ADMIN — HEPARIN SODIUM (PORCINE) LOCK FLUSH IV SOLN 100 UNIT/ML 500 UNITS: 100 SOLUTION INTRAVENOUS at 11:15:00

## 2023-11-29 ENCOUNTER — PATIENT MESSAGE (OUTPATIENT)
Dept: HEMATOLOGY/ONCOLOGY | Facility: HOSPITAL | Age: 63
End: 2023-11-29

## 2023-11-30 DIAGNOSIS — C48.2 MALIGNANT NEOPLASM OF PERITONEUM (HCC): Primary | ICD-10-CM

## 2023-12-04 ENCOUNTER — DOCUMENTATION ONLY (OUTPATIENT)
Dept: HEMATOLOGY/ONCOLOGY | Facility: HOSPITAL | Age: 63
End: 2023-12-04

## 2023-12-04 DIAGNOSIS — C48.2 MALIGNANT NEOPLASM OF PERITONEUM (HCC): ICD-10-CM

## 2023-12-04 NOTE — TELEPHONE ENCOUNTER
From: Dilma Hare  To: Nkechi Bunch  Sent: 11/29/2023 4:49 PM CST  Subject: any response from application    Vern Rene  This is jarad.  Just checking to see if you have heard anything from the drug  regarding the mekinist?

## 2023-12-04 NOTE — PROGRESS NOTES
Prescription for free 30 day trial sent in error initially to [address] [place] [address]. [facility]ent to [facility] in [place]. Also sent prescription for one month with 11 refills to same, correct pharmacy in [facility].

## 2023-12-07 NOTE — TELEPHONE ENCOUNTER
Order for upcoming CT scan faxed to [address] [place] [address] Road orders management at [phone].

## 2023-12-11 ENCOUNTER — APPOINTMENT (OUTPATIENT)
Dept: HEMATOLOGY/ONCOLOGY | Facility: HOSPITAL | Age: 63
End: 2023-12-11
Attending: INTERNAL MEDICINE
Payer: MEDICARE

## 2023-12-11 DIAGNOSIS — Z71.9 ENCOUNTER FOR HEALTH EDUCATION: ICD-10-CM

## 2023-12-11 DIAGNOSIS — C48.2 MALIGNANT NEOPLASM OF PERITONEUM (HCC): Primary | ICD-10-CM

## 2023-12-11 DIAGNOSIS — C56.9 MALIGNANT NEOPLASM OF OVARY, UNSPECIFIED LATERALITY (HCC): ICD-10-CM

## 2023-12-11 DIAGNOSIS — C78.7 MALIGNANT NEOPLASM METASTATIC TO LIVER (HCC): ICD-10-CM

## 2023-12-11 PROCEDURE — 99213 OFFICE O/P EST LOW 20 MIN: CPT | Performed by: NURSE PRACTITIONER

## 2023-12-11 RX ORDER — ONDANSETRON HYDROCHLORIDE 8 MG/1
8 TABLET, FILM COATED ORAL EVERY 8 HOURS PRN
Qty: 30 TABLET | Refills: 3 | Status: SHIPPED | OUTPATIENT
Start: 2023-12-11

## 2023-12-12 ENCOUNTER — APPOINTMENT (OUTPATIENT)
Dept: HEMATOLOGY/ONCOLOGY | Facility: HOSPITAL | Age: 63
End: 2023-12-12
Attending: INTERNAL MEDICINE
Payer: MEDICARE

## 2023-12-12 ENCOUNTER — TELEPHONE (OUTPATIENT)
Dept: HEMATOLOGY/ONCOLOGY | Facility: HOSPITAL | Age: 63
End: 2023-12-12

## 2023-12-12 NOTE — TELEPHONE ENCOUNTER
Daniela [phone]  Josie do I still continue taking  LETROZOLE 2.5 MG Oral Tab  with  Mekinist Trametinib Dimethyl Sulfoxide 2 MG Oral Tab ?  Thanks Laura

## 2023-12-13 ENCOUNTER — TELEPHONE (OUTPATIENT)
Dept: HEMATOLOGY/ONCOLOGY | Facility: HOSPITAL | Age: 63
End: 2023-12-13

## 2023-12-13 NOTE — TELEPHONE ENCOUNTER
Spoke with Jagruti at [facility] and clarified CT should be without oral contrast, as per order.

## 2023-12-13 NOTE — TELEPHONE ENCOUNTER
[facility] called regarding clarification on the CT scan Dr Anabella Vidal ordered. Please call back. Thank you.

## 2023-12-19 ENCOUNTER — NURSE ONLY (OUTPATIENT)
Dept: HEMATOLOGY/ONCOLOGY | Facility: HOSPITAL | Age: 63
End: 2023-12-19
Attending: INTERNAL MEDICINE
Payer: MEDICARE

## 2023-12-19 VITALS
TEMPERATURE: 99 F | WEIGHT: 204 LBS | SYSTOLIC BLOOD PRESSURE: 132 MMHG | DIASTOLIC BLOOD PRESSURE: 72 MMHG | BODY MASS INDEX: 34.83 KG/M2 | OXYGEN SATURATION: 94 % | HEIGHT: 64 IN | HEART RATE: 67 BPM | RESPIRATION RATE: 18 BRPM

## 2023-12-19 DIAGNOSIS — Z45.2 ENCOUNTER FOR ADJUSTMENT OR MANAGEMENT OF VASCULAR ACCESS DEVICE: ICD-10-CM

## 2023-12-19 DIAGNOSIS — C78.7 MALIGNANT NEOPLASM METASTATIC TO LIVER (HCC): ICD-10-CM

## 2023-12-19 DIAGNOSIS — Z51.81 MEDICATION MONITORING ENCOUNTER: ICD-10-CM

## 2023-12-19 DIAGNOSIS — C48.2 MALIGNANT NEOPLASM OF PERITONEUM (HCC): Primary | ICD-10-CM

## 2023-12-19 DIAGNOSIS — Z90.49 H/O RESECTION OF LIVER: ICD-10-CM

## 2023-12-19 LAB
ALBUMIN SERPL-MCNC: 4.2 G/DL (ref 3.2–4.8)
ALBUMIN/GLOB SERPL: 1.5 {RATIO} (ref 1–2)
ALP LIVER SERPL-CCNC: 73 U/L
ALT SERPL-CCNC: 77 U/L
ANION GAP SERPL CALC-SCNC: 5 MMOL/L (ref 0–18)
AST SERPL-CCNC: 65 U/L (ref ?–34)
BASOPHILS # BLD AUTO: 0.06 X10(3) UL (ref 0–0.2)
BASOPHILS NFR BLD AUTO: 1 %
BILIRUB SERPL-MCNC: 1.3 MG/DL (ref 0.2–1.1)
BUN BLD-MCNC: 13 MG/DL (ref 9–23)
BUN/CREAT SERPL: 17.8 (ref 10–20)
CALCIUM BLD-MCNC: 8.5 MG/DL (ref 8.7–10.4)
CHLORIDE SERPL-SCNC: 106 MMOL/L (ref 98–112)
CO2 SERPL-SCNC: 28 MMOL/L (ref 21–32)
CREAT BLD-MCNC: 0.73 MG/DL
DEPRECATED RDW RBC AUTO: 40.6 FL (ref 35.1–46.3)
EGFRCR SERPLBLD CKD-EPI 2021: 92 ML/MIN/1.73M2 (ref 60–?)
EOSINOPHIL # BLD AUTO: 0.1 X10(3) UL (ref 0–0.7)
EOSINOPHIL NFR BLD AUTO: 1.7 %
ERYTHROCYTE [DISTWIDTH] IN BLOOD BY AUTOMATED COUNT: 13 % (ref 11–15)
GLOBULIN PLAS-MCNC: 2.8 G/DL (ref 2.8–4.4)
GLUCOSE BLD-MCNC: 107 MG/DL (ref 70–99)
HCT VFR BLD AUTO: 39.2 %
HGB BLD-MCNC: 12.5 G/DL
IMM GRANULOCYTES # BLD AUTO: 0.02 X10(3) UL (ref 0–1)
IMM GRANULOCYTES NFR BLD: 0.3 %
LYMPHOCYTES # BLD AUTO: 2.55 X10(3) UL (ref 1–4)
LYMPHOCYTES NFR BLD AUTO: 42.1 %
MCH RBC QN AUTO: 27.5 PG (ref 26–34)
MCHC RBC AUTO-ENTMCNC: 31.9 G/DL (ref 31–37)
MCV RBC AUTO: 86.2 FL
MONOCYTES # BLD AUTO: 0.27 X10(3) UL (ref 0.1–1)
MONOCYTES NFR BLD AUTO: 4.5 %
NEUTROPHILS # BLD AUTO: 3.06 X10 (3) UL (ref 1.5–7.7)
NEUTROPHILS # BLD AUTO: 3.06 X10(3) UL (ref 1.5–7.7)
NEUTROPHILS NFR BLD AUTO: 50.4 %
OSMOLALITY SERPL CALC.SUM OF ELEC: 289 MOSM/KG (ref 275–295)
PLATELET # BLD AUTO: 246 10(3)UL (ref 150–450)
POTASSIUM SERPL-SCNC: 3.8 MMOL/L (ref 3.5–5.1)
PROT SERPL-MCNC: 7 G/DL (ref 5.7–8.2)
RBC # BLD AUTO: 4.55 X10(6)UL
SODIUM SERPL-SCNC: 139 MMOL/L (ref 136–145)
WBC # BLD AUTO: 6.1 X10(3) UL (ref 4–11)

## 2023-12-19 PROCEDURE — 80053 COMPREHEN METABOLIC PANEL: CPT

## 2023-12-19 PROCEDURE — 99215 OFFICE O/P EST HI 40 MIN: CPT | Performed by: INTERNAL MEDICINE

## 2023-12-19 PROCEDURE — 85025 COMPLETE CBC W/AUTO DIFF WBC: CPT

## 2023-12-19 PROCEDURE — 36591 DRAW BLOOD OFF VENOUS DEVICE: CPT

## 2023-12-19 RX ORDER — HEPARIN SODIUM (PORCINE) LOCK FLUSH IV SOLN 100 UNIT/ML 100 UNIT/ML
5 SOLUTION INTRAVENOUS ONCE
OUTPATIENT
Start: 2023-12-19

## 2023-12-19 RX ORDER — HEPARIN SODIUM (PORCINE) LOCK FLUSH IV SOLN 100 UNIT/ML 100 UNIT/ML
5 SOLUTION INTRAVENOUS ONCE
Status: COMPLETED | OUTPATIENT
Start: 2023-12-19 | End: 2023-12-19

## 2023-12-19 RX ORDER — SODIUM CHLORIDE 9 MG/ML
10 INJECTION INTRAVENOUS ONCE
OUTPATIENT
Start: 2023-12-19

## 2023-12-19 RX ADMIN — HEPARIN SODIUM (PORCINE) LOCK FLUSH IV SOLN 100 UNIT/ML 500 UNITS: 100 SOLUTION INTRAVENOUS at 10:40:00

## 2023-12-20 ENCOUNTER — TELEPHONE (OUTPATIENT)
Dept: HEMATOLOGY/ONCOLOGY | Facility: HOSPITAL | Age: 63
End: 2023-12-20

## 2023-12-20 NOTE — TELEPHONE ENCOUNTER
Call placed to [employer] ([facility] in [place]) and clarified orders for mekinist. No further questions, patient advised via mychart.

## 2023-12-20 NOTE — TELEPHONE ENCOUNTER
[place] [phone] [address] [facility] has made 3 attempts for this action [phone] option 3. To ok the signature from  for the 12 month supply. This is not for the 30 trial supply. Needs Attention Asap.  Thanks [employer]

## 2024-01-10 ENCOUNTER — APPOINTMENT (OUTPATIENT)
Dept: HEMATOLOGY/ONCOLOGY | Facility: HOSPITAL | Age: 64
End: 2024-01-10
Attending: INTERNAL MEDICINE
Payer: MEDICARE

## 2024-01-24 ENCOUNTER — TELEPHONE (OUTPATIENT)
Dept: HEMATOLOGY/ONCOLOGY | Facility: HOSPITAL | Age: 64
End: 2024-01-24

## 2024-01-24 NOTE — TELEPHONE ENCOUNTER
Alisha [phone] I'm having difficulty getting my medication  Mekinist  / Trametinib Dimethyl Sulfoxide 2 MG Oral Tab I need help right away with this.I think they said something about my number. Thanks Laura

## 2024-02-05 ENCOUNTER — TELEMEDICINE (OUTPATIENT)
Dept: ENDOCRINOLOGY CLINIC | Facility: CLINIC | Age: 64
End: 2024-02-05
Payer: MEDICARE

## 2024-02-05 DIAGNOSIS — Z79.4 CONTROLLED TYPE 2 DIABETES MELLITUS WITHOUT COMPLICATION, WITH LONG-TERM CURRENT USE OF INSULIN (HCC): Primary | ICD-10-CM

## 2024-02-05 DIAGNOSIS — E11.9 CONTROLLED TYPE 2 DIABETES MELLITUS WITHOUT COMPLICATION, WITH LONG-TERM CURRENT USE OF INSULIN (HCC): Primary | ICD-10-CM

## 2024-02-05 RX ORDER — CITALOPRAM HYDROBROMIDE 10 MG/1
10 TABLET ORAL DAILY
Qty: 90 TABLET | Refills: 3 | Status: SHIPPED | OUTPATIENT
Start: 2024-02-05

## 2024-02-05 RX ORDER — CARVEDILOL 12.5 MG/1
12.5 TABLET ORAL 2 TIMES DAILY WITH MEALS
Qty: 180 TABLET | Refills: 3 | Status: SHIPPED | OUTPATIENT
Start: 2024-02-05

## 2024-02-05 RX ORDER — ATORVASTATIN CALCIUM 40 MG/1
40 TABLET, FILM COATED ORAL NIGHTLY
Qty: 90 TABLET | Refills: 3 | Status: SHIPPED | OUTPATIENT
Start: 2024-02-05

## 2024-02-05 NOTE — PROGRESS NOTES
This visit is conducted using Telemedicine with live, interactive video and audio.     Name: Daniela Ellis  Date: 2/5/2024    Referring Physician: No ref. provider found    HISTORY OF PRESENT ILLNESS   Daniela Ellis is a 63 year old female who presents for diabetes mellitus.  She is now maintained on letrozole for ovarian metastatic CA, appetite is improved and she is gaining weight.      She has now changed to new chemotherapy drug and undergoing magnesium infusions.     Prior HbA, C or glycohemoglobin were 11.8 3/2014; 10.8% 9/2014; 8.1% 3/2015; 6.5% 1/2016; 7.0% 6/2017; 9.1% 8/2018; 8.8% 7/2019; 10.0% 2/2020; 9.0% 10/2020; 8.1% 3/2022; 6.6% 7/2022; 6.2% 11/2022; 7.5% 3/2023; 7.1% 9/2023; 6.2% 1/2024     Dietary compliance: Good  Exercise: No  Polyuria/polydipsia: No  Blurred vision: No    Episodes of hypoglycemia: No  Blood Glucose:  Checking 4 times per day     Medications:  Omnipod     Basal:  12A 1.7  5A 2.0  9A 1.9    I:CR 3  Sensitivity 20  Active Insulin Time 4 hours    Dexcom d/c'd due to tape allergy     REVIEW OF SYSTEMS  Eyes: Diabetic retinopathy present: No            Most recent visit to eye doctor in last 12 months: Yes    CV: Cardiovascular disease present: No         Hypertension present: Yes         Hyperlipidemia present: Yes         Peripheral Vascular Disease present: No    : Nephropathy present: No    Neuro: Neuropathy present: No    Skin: Infection or ulceration: No    Osteoporosis: No    Thyroid disease: No      Medications:     Current Outpatient Medications:     Bismuth Subsalicylate (PEPTO-BISMOL OR), Take by mouth as needed., Disp: , Rfl:     ondansetron (ZOFRAN) 8 MG tablet, Take 1 tablet (8 mg total) by mouth every 8 (eight) hours as needed for Nausea., Disp: 30 tablet, Rfl: 3    Trametinib Dimethyl Sulfoxide 2 MG Oral Tab, Take 2 mg by mouth daily., Disp: 30 tablet, Rfl: 0    Trametinib Dimethyl Sulfoxide 2 MG Oral Tab, Take 2 mg by mouth daily., Disp: 30 tablet,  Rfl: 11    Insulin Lispro 100 UNIT/ML Injection Solution, INJECT 180 UNITS AS DIRECTED DAILY. VIA INSULIN PUMP, Disp: , Rfl:     Insulin Disposable Pump (OMNIPOD DASH PODS, GEN 4,) Does not apply Misc, 1 Device every other day., Disp: 45 each, Rfl: 2    carvedilol 12.5 MG Oral Tab, Take 1 tablet (12.5 mg total) by mouth 2 (two) times daily with meals., Disp: 180 tablet, Rfl: 3    atorvastatin 40 MG Oral Tab, Take 1 tablet (40 mg total) by mouth nightly., Disp: 90 tablet, Rfl: 3    citalopram 10 MG Oral Tab, Take 1 tablet (10 mg total) by mouth daily., Disp: 90 tablet, Rfl: 3    NOVOLOG 100 UNIT/ML Injection Solution, INJECT VIA INSULIN PUMP, MAXIMUM  UNITS DAILY DX: E11.65, Disp: 180 mL, Rfl: 1     Allergies:   Allergies   Allergen Reactions    Adhesive Tape RASH and ITCHING     PLEASE USE MEPORE DRESSING AND A 3M STERI STRIP TO SECURE PORT.  Pt itches, might rash with tegaderm and strips inside port kit.       Social History:   Social History     Socioeconomic History    Marital status:    Tobacco Use    Smoking status: Former     Packs/day: 1.00     Years: 15.00     Additional pack years: 0.00     Total pack years: 15.00     Types: Cigarettes     Quit date: 1988     Years since quittin.1    Smokeless tobacco: Never   Vaping Use    Vaping Use: Never used   Substance and Sexual Activity    Alcohol use: No    Drug use: No   Other Topics Concern    Caffeine Concern No       Medical History:   Past Medical History:   Diagnosis Date    Anxiety 5 years    During Cancer Diagnosis    Appendicitis     Chronic diarrhea     Depression 5years    Since cancer diagnosis    Fibroids     Fibroids/Cystadenoma Right Ovary; Management:  OSIEL/BSO; was on HET x5 years and stopped    High blood pressure     High cholesterol     Injury of right foot     Soft tissue injury, right foot    Neuropathy     Bilateral feet    Osteoarthritis     feet, ankles, and shoulders    Ovarian cancer (HCC)     peritoneal and bowel  implants    Pancreatitis 2013    ERCP with stent    Personal history of antineoplastic chemotherapy 2/14-5/31/2014    Chemo    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    \"Psamomma carcinoma/Peritonal implants; Management:  In progress\"    Type 1 diabetes mellitus (HCC)     Vertigo     Visual impairment     glasses for distance and reading       Surgical history:   Past Surgical History:   Procedure Laterality Date    APPENDECTOMY  12/2013    BREAST BIOPSY Left     x's 2 benign    COLONOSCOPY      HYSTERECTOMY  1999    OSIEL/BSO     PARISH LOCALIZATION WIRE 1 SITE LEFT (CPT=19281)      OTHER SURGICAL HISTORY  03/31/2015    Cytoreductive surgery with peritonectomy,   En bloc small bowel resection, partial colectomy, partial cystectomy, partial resection of abdominal wall (8 cm), and omentectomy,  Small bowel resection with primary anastomosis, Mobilization of the splenic flexure, Low anterior resection with colorectal anastomosis, Hyperthermic intraperitoneal chemotherapy with cisplatin and doxorubicin    OTHER SURGICAL HISTORY  07/21/2015    Ileostomy takedown,   Left subclavian vein single-lumen port placement with fluoroscopic guidance.    PORT, INDWELLING, IMP      Power port    UPPER GI ENDOSCOPY - REFERRAL  2013       PHYSICAL EXAM  General Appearance:  alert, in no acute distress  Throat/Neck: normal sound to voice.   Respiratory:  non-labored. no increased work of breathing.    Psychiatric:  oriented to time, self, and place      ASSESSMENT/PLAN:      1. Diabetes Mellitus, Type 2, controlled  -controlled; HgA1c 6.2% -->improved   -Overall blood glucose   -Congratulated patient on improved BG Levels   -Discussed importance of glycemic control to prevent complications of diabetes  -Discussed complications of diabetes include retinopathy, neuropathy, nephropathy and cardiovascular disease  -Discussed importance of SBGM  -Discussed importance of CHO counting  -Concerned about possible low BG levels but she  is not having any symptoms   -Continue current pump settings  -Overall BG levels are improved   -Dexcom discontinued to significant tape allergy   -Normotensive  -Normal lipids    RTC 6 months - will do video visit     2/5/2024  Lesli Flores MD

## 2024-02-27 ENCOUNTER — APPOINTMENT (OUTPATIENT)
Dept: HEMATOLOGY/ONCOLOGY | Facility: HOSPITAL | Age: 64
End: 2024-02-27
Attending: INTERNAL MEDICINE
Payer: MEDICARE

## 2024-03-10 ENCOUNTER — PATIENT MESSAGE (OUTPATIENT)
Dept: ENDOCRINOLOGY CLINIC | Facility: CLINIC | Age: 64
End: 2024-03-10

## 2024-03-11 RX ORDER — INSULIN LISPRO 100 [IU]/ML
INJECTION, SOLUTION INTRAVENOUS; SUBCUTANEOUS
Qty: 180 ML | Refills: 1 | Status: SHIPPED | OUTPATIENT
Start: 2024-03-11

## 2024-03-11 NOTE — TELEPHONE ENCOUNTER
LOV:02/05/24    RTC:6months    FU:No F/U    Pending Monthly Supply: order pending, approve if appropriate.

## 2024-03-13 NOTE — TELEPHONE ENCOUNTER
From: Daniela Maldonado  To: Lesli Flores  Sent: 3/10/2024 1:45 PM CDT  Subject: Omnipod cost    Helraven Flores,  I recently got a $1472.00 bill from the [employer], makers of the Omnipod. This pod has changed my life making insulin delivery effortless while being effective wirelessly. Considering the cost of this device, it is becoming increasingly difficult to pay for. After deductions are taken out from my monthly social security check, I am left with a negative balance after this bill arrives.   Do you have someone on staff who can help advocate for patient access for this device?  Thank you,  Daniela Maldonado

## 2024-03-14 NOTE — TELEPHONE ENCOUNTER
Responded to pt -- advised to contact insulet billing (almost seems like insurance was not being billed?) and email sent to William from [employer] to look into this

## 2024-03-15 NOTE — TELEPHONE ENCOUNTER
Please see patient's message. Ok to give William's information? Or has William emailed you back by chance? Thanks for helping with this.

## 2024-03-18 ENCOUNTER — TELEPHONE (OUTPATIENT)
Dept: ENDOCRINOLOGY CLINIC | Facility: CLINIC | Age: 64
End: 2024-03-18

## 2024-03-18 NOTE — TELEPHONE ENCOUNTER
Pharmacy requesting pa for       insulin lispro 100 UNIT/ML Injection Solution, Inject 180 units subcutaneous daily via insulin pump, Disp: 180 mL, Rfl: 1

## 2024-03-25 NOTE — TELEPHONE ENCOUNTER
Medication PA Requested: Insulin lispro 100UNIT/ML injection solution                                                  CoverMyMeds Used:  Key:  Quantity: 180 mL  Day Supply: 90  Sig: Inject 180 units subcutaneous daily via insulin pump  DX Code: E11.65                                    CPT code (if applicable):   Case Number/Pending Ref#:

## 2024-03-27 NOTE — TELEPHONE ENCOUNTER
Medication PA Requested: Insulin lispro 100UNIT/ML injection solution                                                  CoverMyMeds Used: Yes  Key:[id number]  Quantity: 180 mL  Day Supply: 90  Sig: Inject 180 units subcutaneous daily via insulin pump  DX Code: E11.65                                    CPT code (if applicable):   Case Number/Pending Ref#:      CMM submitted, no office visit notes needed per CMM  Awaiting determination

## 2024-03-29 NOTE — TELEPHONE ENCOUNTER
Ok noted.     Chart review no contraindication noted with aspart insulin.     New rx for aspart insulin sent to pharmacy. Please notify patient of this change.     Thank you

## 2024-03-29 NOTE — TELEPHONE ENCOUNTER
Received fax from [facility] in regards to Insulin Lispro. Medication has been denied because \" the requested drug is not on your plan's formulary list of covered drugs. Your prescriber must provide information that documents at least one of the following has occurred: you have tried the formulary drugs for the treatment of you condition and they did not work for you, the formulary drugs could cause adverse affects, and the formulary drugs would be less effective for your condition than the requested drug\". [employer] message sent to pt and denial for placed in denial folder. Message routed to provider.    Member #- [id number]

## 2024-04-01 RX ORDER — INSULIN ASPART 100 [IU]/ML
INJECTION, SOLUTION INTRAVENOUS; SUBCUTANEOUS
Qty: 180 ML | Refills: 1 | Status: SHIPPED | OUTPATIENT
Start: 2024-04-01

## 2024-04-03 ENCOUNTER — TELEPHONE (OUTPATIENT)
Dept: ENDOCRINOLOGY CLINIC | Facility: CLINIC | Age: 64
End: 2024-04-03

## 2024-04-03 DIAGNOSIS — E11.9 CONTROLLED TYPE 2 DIABETES MELLITUS WITHOUT COMPLICATION, WITH LONG-TERM CURRENT USE OF INSULIN (HCC): Primary | ICD-10-CM

## 2024-04-03 DIAGNOSIS — Z79.4 CONTROLLED TYPE 2 DIABETES MELLITUS WITHOUT COMPLICATION, WITH LONG-TERM CURRENT USE OF INSULIN (HCC): Primary | ICD-10-CM

## 2024-04-03 RX ORDER — INSULIN ASPART 100 [IU]/ML
INJECTION, SOLUTION INTRAVENOUS; SUBCUTANEOUS
Qty: 180 ML | Refills: 1 | Status: SHIPPED | OUTPATIENT
Start: 2024-04-03

## 2024-04-03 NOTE — TELEPHONE ENCOUNTER
Patients calling to request rx insulin [facility] the [place] (not the mail order). Patient request to process a soon as possible, thanks.

## 2024-05-30 RX ORDER — INSULIN PUMP CONTROLLER
1 EACH MISCELLANEOUS EVERY OTHER DAY
Qty: 45 EACH | Refills: 2 | Status: SHIPPED | OUTPATIENT
Start: 2024-05-30

## 2024-05-30 NOTE — TELEPHONE ENCOUNTER
Endocrine Refill protocol for CGM supplies       Protocol Criteria:  Appointment with Endocrinology completed in the last 12 months or scheduled in the next 6 months     Verify appointment has been completed or scheduled in the appropriate timeline. If so can send a 90 day supply with 1 refill.        Last completed office visit:02/05/24  Next scheduled Follow up: No future appointments.

## 2024-06-10 ENCOUNTER — TELEPHONE (OUTPATIENT)
Dept: ENDOCRINOLOGY CLINIC | Facility: CLINIC | Age: 64
End: 2024-06-10

## 2024-06-10 NOTE — TELEPHONE ENCOUNTER
Dr. Flores, see patient request for a video visit in September. Patient's last in person OV was 11/14/22. Last 3 appts have been virtual, but in OV says do video visit in 6 months. Is video ok?

## 2024-06-13 ENCOUNTER — PATIENT MESSAGE (OUTPATIENT)
Dept: ENDOCRINOLOGY CLINIC | Facility: CLINIC | Age: 64
End: 2024-06-13

## 2024-06-13 NOTE — TELEPHONE ENCOUNTER
Dr. Flores -- your September schedule is booked but you do have a couple res 24's available. Can I offer any of these to the patient?     9/18 - 2 pm   9/19 - 5 pm   9/26 - 3pm, 5pm

## 2024-06-14 NOTE — TELEPHONE ENCOUNTER
From: Daniela Maldonado  To: Lesli Flores  Sent: 6/13/2024 6:00 PM CDT  Subject: Video Visit with Dr Mark Vanegas,   I would like to schedule a telehealth   Visit with Dr Flores October/November.  Thank you,  Daniela Maldonado

## 2024-12-13 NOTE — TELEPHONE ENCOUNTER
Attempted to initiate PA over covermymeds but unable to confirm patient eligibility with [facility] michael. There is previous approval letter from michael scanned into chart under media with her ID number and phone number.  This likely needs to be completed ove
From: Maribell Brown  To: Perla Rock MD  Sent: 6/22/2020 11:26 PM CDT  Subject: Prescription Question    Hello,   My Omnipod Cartridge prescription is out of refills.     [employer] rep states Prior Authorization is needed from Dr Rah De Leon  The
Noted. Thank you.
PA Approved for Omnipod 5 Pack. Please watch for Fax. Pt contacted as she requested call back. Pt. Contacted and  Informed PA approved.
Pt contacted and infomed PA in process.      [facility]  [id number]  [id number]  PCN-[id number]  GROUP-[id number]    Medication PA Requested:   OMNIPOD 5 PACK Does not apply Misc
SCDs

## 2024-12-24 NOTE — PROGRESS NOTES
Cancer Center Progress Note    Patient Name: Natalio Gupta   YOB: 1960   Medical Record Number: [id number]   Attending Physician: Isaiah Navarrete M.D.      Chief Complaint:  F/u primary peritoneal carcinoma    History of Present Illness:  Aysha months of anticoagulation with rivaroxaban. Thrombus was resolved on subsequent imaging      Interval history:  She is returning for routine follow-up. She is doing well overall. She states she has gained some weight again. She denies abdominal pain. OR  1999: HYSTERECTOMY      Comment:  OSIEL/BSO   7/21/2015: ILEOSTOMY TAKE DOWN; N/A      Comment:  Performed by Remedios [facility] at [facility] MAIN OR  No date: PARISH NEEDLE LOCALIZATION W/ SPECIMEN 1 SITE LEFT  03/31/2015: OTHER SURGICAL HISTORY      Comment:  Cyt file    Occupational History      Not on file    Tobacco Use      Smoking status: Former Smoker        Packs/day: 1.00        Years: 15.00        Pack years: 15        Types: Cigarettes        Quit date: 1989        Years since quittin.7      Smok 1    Allergies:    Adhesive Tape           ITCHING    Comment:PLEASE USE MEPORE DRESSING AND A 3M STERI STRIP TO             SECURE PORT. Pt itches, might rash with tegaderm             and strips inside port kit.      Review of Systems:  All other systems r intra-abdominal wall, but there is   no evidence of obstruction. 5. Lesser incidental findings as above.              Impression and Plan:  60 y/o woman metastatic primary peritoneal carcinoma, platinum resistant.   She initially presented to ER w/abdom laboratory work. CT next in 6 months. --Knee arthralgias likely secondary to letrozole. Monitor for now. –SMV thrombosis --started anticoagulation in March 2017. Resolved on imaging.   Given she has completed 6 months of anticoagulation is okay to mon Attending Attestation (For Attendings USE Only)...

## 2025-04-21 ENCOUNTER — LAB REQUISITION (OUTPATIENT)
Dept: LAB | Facility: HOSPITAL | Age: 65
End: 2025-04-21
Payer: MEDICARE

## 2025-04-21 DIAGNOSIS — C48.2 MALIGNANT NEOPLASM OF PERITONEUM, UNSPECIFIED (HCC): ICD-10-CM

## 2025-04-21 PROCEDURE — 88363 XM ARCHIVE TISSUE MOLEC ANAL: CPT | Performed by: PATHOLOGY

## (undated) DEVICE — 3M™ BAIR HUGGER® UNDERBODY BLANKET, FULL ACCESS, 10 PER CASE 63500: Brand: BAIR HUGGER™

## (undated) DEVICE — 3M™ TEGADERM™ TRANSPARENT FILM DRESSING, 1626W, 4 IN X 4-3/4 IN (10 CM X 12 CM), 50 EACH/CARTON, 4 CARTON/CASE: Brand: 3M™ TEGADERM™

## (undated) DEVICE — SURGICAL SUCTION CONNECTING TUBE WITH MALE CONNECTOR AND SUCTION CLAMP, 2 FT. LONG (.6 M), 5 MM I.D.: Brand: CONMED

## (undated) DEVICE — SPONGE LAP 18X18 XRAY STRL

## (undated) DEVICE — CLIP LG INTNL HMCLP TNTLM ESCP

## (undated) DEVICE — SUTURE SILK 2-0 SH

## (undated) DEVICE — SUTURE SILK 3-0 SH

## (undated) DEVICE — SPONGE: SPECIALTY PEANUT XR 100/CS: Brand: MEDICAL ACTION INDUSTRIES

## (undated) DEVICE — 3M™ IOBAN™ 2 ANTIMICROBIAL INCISE DRAPE 6651EZ: Brand: IOBAN™ 2

## (undated) DEVICE — CLIP MED INTNL HMCLP TNTLM

## (undated) DEVICE — DRAPE SHEET LAPCHOLE 124X100X7

## (undated) DEVICE — DRAPE CASSETTE X-RAY

## (undated) DEVICE — CLIP SM INTNL HMCLP TNTLM ESCP

## (undated) DEVICE — GAMMEX® PI HYBRID SIZE 7, STERILE POWDER-FREE SURGICAL GLOVE, POLYISOPRENE AND NEOPRENE BLEND: Brand: GAMMEX

## (undated) DEVICE — ABSORBABLE HEMOSTAT (OXIDIZED REGENERATED CELLULOSE, U.S.P.): Brand: SURGICEL

## (undated) DEVICE — SUTURE PROLENE 2-0 8533H

## (undated) DEVICE — SUTURE SILK 2-0 SA85H

## (undated) DEVICE — DRAPE SLUSH/WARMER W/DISC

## (undated) DEVICE — SURGICEL FIBULAR 2X4

## (undated) DEVICE — SUTURE PROLENE 3-0 SH

## (undated) DEVICE — HEMOCLIP HORIZON MED 002200

## (undated) DEVICE — Device

## (undated) DEVICE — SUTURE CHROMIC GUT 3-0 SH

## (undated) DEVICE — YANKAUER SUCTION INSTRUMENT NO CONTROL VENT, BULB TIP, CLEAR: Brand: YANKAUER

## (undated) DEVICE — PROXIMATE SKIN STAPLERS (35 WIDE) CONTAINS 35 STAINLESS STEEL STAPLES (FIXED HEAD): Brand: PROXIMATE

## (undated) DEVICE — A P RESECTION: Brand: MEDLINE INDUSTRIES, INC.

## (undated) DEVICE — NON-ADHERENT PAD PREPACK: Brand: TELFA

## (undated) DEVICE — LIGASURE IMPACT OPEN DEVICE

## (undated) DEVICE — BLADE 24 SS SRG STRL

## (undated) DEVICE — SUTURE PROLENE 4-0 RB-1

## (undated) DEVICE — UNDYED BRAIDED (POLYGLACTIN 910), SYNTHETIC ABSORBABLE SUTURE: Brand: COATED VICRYL

## (undated) DEVICE — SUTURE VICRYL 2-0 SH

## (undated) DEVICE — REM POLYHESIVE ADULT PATIENT RETURN ELECTRODE: Brand: VALLEYLAB

## (undated) DEVICE — SUTURE ETHILON 3-0 669H

## (undated) DEVICE — INTENDED TO BE USED TO OCCLUDE, RETRACT AND IDENTIFY ARTERIES, VEINS, TENDONS AND NERVES IN SURGICAL PROCEDURES: Brand: STERION®  VESSEL LOOP

## (undated) DEVICE — SUTURE PDS II 1-0 Z881G

## (undated) DEVICE — SUTURE PROLENE 2-0 SH

## (undated) DEVICE — SOL  .9 1000ML BTL

## (undated) DEVICE — SUTURE SILK 0

## (undated) DEVICE — SUTURE ETHIBOND EXCEL 2-0 SH

## (undated) NOTE — MR AVS SNAPSHOT
[address], [address]  [address] [facility]  [phone]               Thank you for choosing us for your health care visit with Misael العراقي MD.  We are glad to serve you and happy to provide you with this summary o Assoc Dx:  Uncontrolled type 2 diabetes mellitus with hyperglycemia, with long-term current use of insulin (HCC) [E11.65, Z79.4]                 Follow-up Instructions     Return in about 6 months (around 12/7/2017).          Reason for Today's Visit     D Take 1 tablet (20 mg total) by mouth daily with food.    Commonly known as:  Herman Phillips                Where to Get Your Medications      These medications were sent to [facility] # [address], [address] [facility] [phone], [phone]

## (undated) NOTE — MR AVS SNAPSHOT
Chandrakant Hdez   2017 11:00 AM   Office Visit   MRN:  [id number]    Description:  Female : 10/8/1960   Provider:  August Alexandra   Department:  [facility] AND [facility] Hematology Oncology              Visit Summary      Primary Visit Diagnosis healthcare providers. At [facility] we continue to have an Open Medical Record policy. We can provide you with your current medication list and lab results today.   If you would like to review your medical record, we can assist you to se

## (undated) NOTE — MR AVS SNAPSHOT
After Visit Summary   2/3/2017    Maribell Brown    MRN: [id number]           Visit Information        Provider Department Dept Phone    2/3/2017 11:00 AM [facility] Chemo Infusion [phone]      Your Vitals Were     LMP EAT THESE FOODS MORE OFTEN: EAT THESE FOODS LESS OFTEN:   Make half your plate fruits and vegetables Highly refined, white starches including white bread, rice and pasta   Eat plenty of protein, keep the fat content low Sugars:  sodas and sports drinks, ca

## (undated) NOTE — MR AVS SNAPSHOT
Maximus Alvarez   2/3/2017 11:00 AM   Nurse Only   MRN:  [id number]    Description:  Female : 10/8/1960   Department:  [facility]              Visit Summary      Primary Visit Diagnosis     Encounter for adjustmen CHALLENGER)  Patients with all other diagnoses will drink oral contrast.  INSTRUCTIONS FOR PATIENTS DRINKING ORAL CONTRAST:  Your doctor has requested your test to include oral contrast.  If you need to  oral contrast and your doctor has provided yo than 0.01% of the intravascular dose given to the mother. If the mother remains concerned about any potential ill effects to the infant, she may abstain from breast-feeding from the time of contrast administration for a period of 12 to 24 hours.  There is

## (undated) NOTE — MR AVS SNAPSHOT
[facility]   2017 1:00 PM   Nurse Only   MRN:  [id number]    Description:  Female : 10/8/1960   Department:  [facility]              Visit Summary      Primary Visit Diagnosis     Primary peritoneal carci view more details from this visit by going to [email or web address] ([email or web address]. If you've recently had a stay at the Hospital you can access your discharge instructions in [employer] by going to Visits < Admission Summaries.  If you've been to the Emergency Depar

## (undated) NOTE — MR AVS SNAPSHOT
Ellen Mcbride   3/30/2017 11:30 AM   Nurse Only   MRN:  [id number]    Description:  Female : 10/8/1960   Department:  [facility]              Visit Summary      Primary Visit Diagnosis     Encounter for adjustme [employer] questions? Call [phone] for help. [employer] is NOT to be used for urgent needs. For medical emergencies, dial 911.

## (undated) NOTE — MR AVS SNAPSHOT
David Gallardo   2017 1:45 PM   Office Visit   MRN:  [id number]    Description:  Female : 10/8/1960   Provider:  Edison Brown   Department:  [facility] AND [facility] Hematology Oncology              Visit Summary      Primary Visit Diagnosis appropriate medical professional to review your records. Follow-up Instructions     Return in about 10 weeks (around 8/15/2017).       To Do List     Tuesday June 06, 2017     LAB:  CBC WITH DIFFERENTIAL WITH PLATELET        Tuesday June 06, 2017     L